# Patient Record
Sex: MALE | Race: WHITE | NOT HISPANIC OR LATINO | Employment: UNEMPLOYED | URBAN - NONMETROPOLITAN AREA
[De-identification: names, ages, dates, MRNs, and addresses within clinical notes are randomized per-mention and may not be internally consistent; named-entity substitution may affect disease eponyms.]

---

## 2016-02-15 LAB — HCV AB SER-ACNC: NEGATIVE

## 2016-12-15 LAB
EXTERNAL HIV CONFIRMATION: NORMAL
EXTERNAL HIV SCREEN: NORMAL

## 2024-03-17 ENCOUNTER — HOSPITAL ENCOUNTER (INPATIENT)
Facility: HOSPITAL | Age: 34
LOS: 1 days | DRG: 045 | End: 2024-03-18
Attending: EMERGENCY MEDICINE | Admitting: ANESTHESIOLOGY
Payer: COMMERCIAL

## 2024-03-17 ENCOUNTER — APPOINTMENT (EMERGENCY)
Dept: CT IMAGING | Facility: HOSPITAL | Age: 34
DRG: 045 | End: 2024-03-17
Payer: COMMERCIAL

## 2024-03-17 ENCOUNTER — APPOINTMENT (EMERGENCY)
Dept: RADIOLOGY | Facility: HOSPITAL | Age: 34
DRG: 045 | End: 2024-03-17
Payer: COMMERCIAL

## 2024-03-17 DIAGNOSIS — R53.1 ACUTE LEFT-SIDED WEAKNESS: Primary | ICD-10-CM

## 2024-03-17 DIAGNOSIS — I63.9 STROKE (HCC): ICD-10-CM

## 2024-03-17 DIAGNOSIS — I16.1 HYPERTENSIVE EMERGENCY: ICD-10-CM

## 2024-03-17 PROBLEM — I63.532 CEREBROVASCULAR ACCIDENT (CVA) DUE TO OCCLUSION OF LEFT POSTERIOR CEREBRAL ARTERY (HCC): Status: ACTIVE | Noted: 2024-03-17

## 2024-03-17 PROBLEM — F11.90 METHADONE USE: Status: ACTIVE | Noted: 2024-03-17

## 2024-03-17 PROBLEM — I67.1 ANEURYSM OF ANTERIOR COMMUNICATING ARTERY: Status: ACTIVE | Noted: 2024-03-17

## 2024-03-17 LAB
2HR DELTA HS TROPONIN: 16 NG/L
2HR DELTA HS TROPONIN: 227 NG/L
4HR DELTA HS TROPONIN: 275 NG/L
4HR DELTA HS TROPONIN: 81 NG/L
ALBUMIN SERPL BCP-MCNC: 3.7 G/DL (ref 3.5–5)
ALP SERPL-CCNC: 54 U/L (ref 34–104)
ALT SERPL W P-5'-P-CCNC: 15 U/L (ref 7–52)
AMPHETAMINES SERPL QL SCN: NEGATIVE
AMPHETAMINES SERPL QL SCN: NEGATIVE
ANION GAP SERPL CALCULATED.3IONS-SCNC: 5 MMOL/L (ref 4–13)
APTT PPP: 21 SECONDS (ref 23–37)
AST SERPL W P-5'-P-CCNC: 13 U/L (ref 13–39)
BARBITURATES UR QL: NEGATIVE
BARBITURATES UR QL: NEGATIVE
BENZODIAZ UR QL: NEGATIVE
BENZODIAZ UR QL: NEGATIVE
BILIRUB SERPL-MCNC: 0.55 MG/DL (ref 0.2–1)
BILIRUB UR QL STRIP: NEGATIVE
BNP SERPL-MCNC: 98 PG/ML (ref 0–100)
BUN SERPL-MCNC: 17 MG/DL (ref 5–25)
C3 SERPL-MCNC: 140 MG/DL (ref 87–200)
C4 SERPL-MCNC: 34 MG/DL (ref 19–52)
CALCIUM SERPL-MCNC: 9 MG/DL (ref 8.4–10.2)
CARDIAC TROPONIN I PNL SERPL HS: 13 NG/L
CARDIAC TROPONIN I PNL SERPL HS: 155 NG/L
CARDIAC TROPONIN I PNL SERPL HS: 29 NG/L
CARDIAC TROPONIN I PNL SERPL HS: 301 NG/L
CARDIAC TROPONIN I PNL SERPL HS: 349 NG/L
CARDIAC TROPONIN I PNL SERPL HS: 74 NG/L
CHLORIDE SERPL-SCNC: 101 MMOL/L (ref 96–108)
CK SERPL-CCNC: 69 U/L (ref 39–308)
CLARITY UR: CLEAR
CO2 SERPL-SCNC: 28 MMOL/L (ref 21–32)
COCAINE UR QL: NEGATIVE
COCAINE UR QL: NEGATIVE
COLOR UR: YELLOW
CREAT SERPL-MCNC: 1.08 MG/DL (ref 0.6–1.3)
CRP SERPL HS-MCNC: 5.13 MG/L
CRP SERPL QL: 4.7 MG/L
CRP SERPL QL: 5.9 MG/L
ERYTHROCYTE [DISTWIDTH] IN BLOOD BY AUTOMATED COUNT: 13.1 % (ref 11.6–15.1)
ERYTHROCYTE [SEDIMENTATION RATE] IN BLOOD: 7 MM/HOUR (ref 0–14)
ETHANOL SERPL-MCNC: <10 MG/DL
FLUAV RNA RESP QL NAA+PROBE: NEGATIVE
FLUBV RNA RESP QL NAA+PROBE: NEGATIVE
GFR SERPL CREATININE-BSD FRML MDRD: 88 ML/MIN/1.73SQ M
GLUCOSE SERPL-MCNC: 116 MG/DL (ref 65–140)
GLUCOSE SERPL-MCNC: 120 MG/DL (ref 65–140)
GLUCOSE UR STRIP-MCNC: NEGATIVE MG/DL
HCT VFR BLD AUTO: 38.7 % (ref 36.5–49.3)
HCYS SERPL-SCNC: 10.9 UMOL/L (ref 5–15)
HGB BLD-MCNC: 12.9 G/DL (ref 12–17)
HGB UR QL STRIP.AUTO: NEGATIVE
INR PPP: 1.1 (ref 0.84–1.19)
KETONES UR STRIP-MCNC: NEGATIVE MG/DL
LACTATE SERPL-SCNC: 1.3 MMOL/L (ref 0.5–2)
LEUKOCYTE ESTERASE UR QL STRIP: NEGATIVE
MCH RBC QN AUTO: 27.7 PG (ref 26.8–34.3)
MCHC RBC AUTO-ENTMCNC: 33.3 G/DL (ref 31.4–37.4)
MCV RBC AUTO: 83 FL (ref 82–98)
METHADONE UR QL: POSITIVE
METHADONE UR QL: POSITIVE
NITRITE UR QL STRIP: NEGATIVE
OPIATES UR QL SCN: NEGATIVE
OPIATES UR QL SCN: NEGATIVE
OXYCODONE+OXYMORPHONE UR QL SCN: NEGATIVE
OXYCODONE+OXYMORPHONE UR QL SCN: NEGATIVE
PCP UR QL: NEGATIVE
PCP UR QL: NEGATIVE
PH UR STRIP.AUTO: 8.5 [PH]
PLATELET # BLD AUTO: 150 THOUSANDS/UL (ref 149–390)
PMV BLD AUTO: 10.7 FL (ref 8.9–12.7)
POTASSIUM SERPL-SCNC: 3.7 MMOL/L (ref 3.5–5.3)
PROT SERPL-MCNC: 5.8 G/DL (ref 6.4–8.4)
PROT UR STRIP-MCNC: NEGATIVE MG/DL
PROTHROMBIN TIME: 14.6 SECONDS (ref 11.6–14.5)
RBC # BLD AUTO: 4.66 MILLION/UL (ref 3.88–5.62)
RSV RNA RESP QL NAA+PROBE: NEGATIVE
SARS-COV-2 RNA RESP QL NAA+PROBE: NEGATIVE
SODIUM SERPL-SCNC: 134 MMOL/L (ref 135–147)
SP GR UR STRIP.AUTO: 1.01 (ref 1–1.03)
THC UR QL: NEGATIVE
THC UR QL: NEGATIVE
TSH SERPL DL<=0.05 MIU/L-ACNC: 0.83 UIU/ML (ref 0.45–4.5)
UROBILINOGEN UR QL STRIP.AUTO: 0.2 E.U./DL
WBC # BLD AUTO: 7.01 THOUSAND/UL (ref 4.31–10.16)

## 2024-03-17 PROCEDURE — 85652 RBC SED RATE AUTOMATED: CPT | Performed by: PHYSICIAN ASSISTANT

## 2024-03-17 PROCEDURE — 82948 REAGENT STRIP/BLOOD GLUCOSE: CPT

## 2024-03-17 PROCEDURE — G0508 CRIT CARE TELEHEA CONSULT 60: HCPCS | Performed by: STUDENT IN AN ORGANIZED HEALTH CARE EDUCATION/TRAINING PROGRAM

## 2024-03-17 PROCEDURE — 85610 PROTHROMBIN TIME: CPT | Performed by: EMERGENCY MEDICINE

## 2024-03-17 PROCEDURE — 82077 ASSAY SPEC XCP UR&BREATH IA: CPT | Performed by: PHYSICIAN ASSISTANT

## 2024-03-17 PROCEDURE — 85300 ANTITHROMBIN III ACTIVITY: CPT | Performed by: PHYSICIAN ASSISTANT

## 2024-03-17 PROCEDURE — 85306 CLOT INHIBIT PROT S FREE: CPT | Performed by: PHYSICIAN ASSISTANT

## 2024-03-17 PROCEDURE — 82550 ASSAY OF CK (CPK): CPT | Performed by: PHYSICIAN ASSISTANT

## 2024-03-17 PROCEDURE — 85705 THROMBOPLASTIN INHIBITION: CPT | Performed by: PHYSICIAN ASSISTANT

## 2024-03-17 PROCEDURE — 86160 COMPLEMENT ANTIGEN: CPT | Performed by: PHYSICIAN ASSISTANT

## 2024-03-17 PROCEDURE — 86141 C-REACTIVE PROTEIN HS: CPT | Performed by: PHYSICIAN ASSISTANT

## 2024-03-17 PROCEDURE — 85027 COMPLETE CBC AUTOMATED: CPT | Performed by: EMERGENCY MEDICINE

## 2024-03-17 PROCEDURE — 86235 NUCLEAR ANTIGEN ANTIBODY: CPT | Performed by: PHYSICIAN ASSISTANT

## 2024-03-17 PROCEDURE — 84443 ASSAY THYROID STIM HORMONE: CPT | Performed by: PHYSICIAN ASSISTANT

## 2024-03-17 PROCEDURE — 83520 IMMUNOASSAY QUANT NOS NONAB: CPT | Performed by: PHYSICIAN ASSISTANT

## 2024-03-17 PROCEDURE — 96375 TX/PRO/DX INJ NEW DRUG ADDON: CPT

## 2024-03-17 PROCEDURE — 70496 CT ANGIOGRAPHY HEAD: CPT

## 2024-03-17 PROCEDURE — 36415 COLL VENOUS BLD VENIPUNCTURE: CPT | Performed by: EMERGENCY MEDICINE

## 2024-03-17 PROCEDURE — 71045 X-RAY EXAM CHEST 1 VIEW: CPT

## 2024-03-17 PROCEDURE — 83605 ASSAY OF LACTIC ACID: CPT | Performed by: PHYSICIAN ASSISTANT

## 2024-03-17 PROCEDURE — 83090 ASSAY OF HOMOCYSTEINE: CPT | Performed by: PHYSICIAN ASSISTANT

## 2024-03-17 PROCEDURE — 83880 ASSAY OF NATRIURETIC PEPTIDE: CPT | Performed by: PHYSICIAN ASSISTANT

## 2024-03-17 PROCEDURE — 86147 CARDIOLIPIN ANTIBODY EA IG: CPT | Performed by: PHYSICIAN ASSISTANT

## 2024-03-17 PROCEDURE — 93005 ELECTROCARDIOGRAM TRACING: CPT

## 2024-03-17 PROCEDURE — 84484 ASSAY OF TROPONIN QUANT: CPT | Performed by: PHYSICIAN ASSISTANT

## 2024-03-17 PROCEDURE — 85730 THROMBOPLASTIN TIME PARTIAL: CPT | Performed by: EMERGENCY MEDICINE

## 2024-03-17 PROCEDURE — 96374 THER/PROPH/DIAG INJ IV PUSH: CPT

## 2024-03-17 PROCEDURE — 70498 CT ANGIOGRAPHY NECK: CPT

## 2024-03-17 PROCEDURE — 84484 ASSAY OF TROPONIN QUANT: CPT | Performed by: EMERGENCY MEDICINE

## 2024-03-17 PROCEDURE — 99285 EMERGENCY DEPT VISIT HI MDM: CPT

## 2024-03-17 PROCEDURE — 86140 C-REACTIVE PROTEIN: CPT | Performed by: PHYSICIAN ASSISTANT

## 2024-03-17 PROCEDURE — 85732 THROMBOPLASTIN TIME PARTIAL: CPT | Performed by: PHYSICIAN ASSISTANT

## 2024-03-17 PROCEDURE — 86038 ANTINUCLEAR ANTIBODIES: CPT | Performed by: PHYSICIAN ASSISTANT

## 2024-03-17 PROCEDURE — 85305 CLOT INHIBIT PROT S TOTAL: CPT | Performed by: PHYSICIAN ASSISTANT

## 2024-03-17 PROCEDURE — 86146 BETA-2 GLYCOPROTEIN ANTIBODY: CPT | Performed by: PHYSICIAN ASSISTANT

## 2024-03-17 PROCEDURE — 99291 CRITICAL CARE FIRST HOUR: CPT | Performed by: EMERGENCY MEDICINE

## 2024-03-17 PROCEDURE — 80307 DRUG TEST PRSMV CHEM ANLYZR: CPT | Performed by: EMERGENCY MEDICINE

## 2024-03-17 PROCEDURE — 86037 ANCA TITER EACH ANTIBODY: CPT | Performed by: PHYSICIAN ASSISTANT

## 2024-03-17 PROCEDURE — 82164 ANGIOTENSIN I ENZYME TEST: CPT | Performed by: PHYSICIAN ASSISTANT

## 2024-03-17 PROCEDURE — 86430 RHEUMATOID FACTOR TEST QUAL: CPT | Performed by: PHYSICIAN ASSISTANT

## 2024-03-17 PROCEDURE — 86162 COMPLEMENT TOTAL (CH50): CPT | Performed by: PHYSICIAN ASSISTANT

## 2024-03-17 PROCEDURE — 85303 CLOT INHIBIT PROT C ACTIVITY: CPT | Performed by: PHYSICIAN ASSISTANT

## 2024-03-17 PROCEDURE — 85670 THROMBIN TIME PLASMA: CPT | Performed by: PHYSICIAN ASSISTANT

## 2024-03-17 PROCEDURE — 81003 URINALYSIS AUTO W/O SCOPE: CPT | Performed by: PHYSICIAN ASSISTANT

## 2024-03-17 PROCEDURE — 80053 COMPREHEN METABOLIC PANEL: CPT | Performed by: EMERGENCY MEDICINE

## 2024-03-17 PROCEDURE — 85613 RUSSELL VIPER VENOM DILUTED: CPT | Performed by: PHYSICIAN ASSISTANT

## 2024-03-17 PROCEDURE — 0241U HB NFCT DS VIR RESP RNA 4 TRGT: CPT | Performed by: EMERGENCY MEDICINE

## 2024-03-17 PROCEDURE — NC001 PR NO CHARGE: Performed by: STUDENT IN AN ORGANIZED HEALTH CARE EDUCATION/TRAINING PROGRAM

## 2024-03-17 RX ORDER — ONDANSETRON 2 MG/ML
4 INJECTION INTRAMUSCULAR; INTRAVENOUS ONCE
Status: COMPLETED | OUTPATIENT
Start: 2024-03-17 | End: 2024-03-17

## 2024-03-17 RX ORDER — ASPIRIN 81 MG/1
81 TABLET, CHEWABLE ORAL DAILY
Status: DISCONTINUED | OUTPATIENT
Start: 2024-03-18 | End: 2024-03-18 | Stop reason: HOSPADM

## 2024-03-17 RX ORDER — METOCLOPRAMIDE HYDROCHLORIDE 5 MG/ML
10 INJECTION INTRAMUSCULAR; INTRAVENOUS ONCE
Status: COMPLETED | OUTPATIENT
Start: 2024-03-17 | End: 2024-03-17

## 2024-03-17 RX ORDER — HYDRALAZINE HYDROCHLORIDE 20 MG/ML
10 INJECTION INTRAMUSCULAR; INTRAVENOUS ONCE
Status: COMPLETED | OUTPATIENT
Start: 2024-03-17 | End: 2024-03-17

## 2024-03-17 RX ORDER — METHADONE HYDROCHLORIDE 10 MG/1
60 TABLET ORAL ONCE
Status: COMPLETED | OUTPATIENT
Start: 2024-03-17 | End: 2024-03-17

## 2024-03-17 RX ORDER — METHADONE HYDROCHLORIDE 10 MG/1
50 TABLET ORAL ONCE
Qty: 5 TABLET | Refills: 0 | Status: COMPLETED | OUTPATIENT
Start: 2024-03-17 | End: 2024-03-17

## 2024-03-17 RX ORDER — ESMOLOL HYDROCHLORIDE 10 MG/ML
25-200 INJECTION, SOLUTION INTRAVENOUS
Status: DISCONTINUED | OUTPATIENT
Start: 2024-03-17 | End: 2024-03-17

## 2024-03-17 RX ORDER — ENOXAPARIN SODIUM 100 MG/ML
40 INJECTION SUBCUTANEOUS DAILY
Status: DISCONTINUED | OUTPATIENT
Start: 2024-03-18 | End: 2024-03-18 | Stop reason: HOSPADM

## 2024-03-17 RX ORDER — METHADONE HYDROCHLORIDE 10 MG/1
TABLET ORAL
Status: DISCONTINUED
Start: 2024-03-17 | End: 2024-03-18 | Stop reason: HOSPADM

## 2024-03-17 RX ORDER — LABETALOL HYDROCHLORIDE 5 MG/ML
10 INJECTION, SOLUTION INTRAVENOUS
Status: COMPLETED | OUTPATIENT
Start: 2024-03-17 | End: 2024-03-17

## 2024-03-17 RX ORDER — LABETALOL HYDROCHLORIDE 5 MG/ML
10 INJECTION, SOLUTION INTRAVENOUS ONCE
Status: COMPLETED | OUTPATIENT
Start: 2024-03-17 | End: 2024-03-17

## 2024-03-17 RX ORDER — ESMOLOL HYDROCHLORIDE 10 MG/ML
40 INJECTION INTRAVENOUS ONCE
Status: DISCONTINUED | OUTPATIENT
Start: 2024-03-17 | End: 2024-03-17

## 2024-03-17 RX ORDER — METHADONE HYDROCHLORIDE 10 MG/5ML
110 SOLUTION ORAL EVERY 6 HOURS PRN
Status: ON HOLD | COMMUNITY
End: 2024-03-23

## 2024-03-17 RX ORDER — METHADONE HYDROCHLORIDE 5 MG/1
TABLET ORAL
Status: DISCONTINUED
Start: 2024-03-17 | End: 2024-03-18 | Stop reason: HOSPADM

## 2024-03-17 RX ORDER — LABETALOL HYDROCHLORIDE 5 MG/ML
10 INJECTION, SOLUTION INTRAVENOUS EVERY 4 HOURS PRN
Status: DISCONTINUED | OUTPATIENT
Start: 2024-03-17 | End: 2024-03-18 | Stop reason: HOSPADM

## 2024-03-17 RX ORDER — CHLORHEXIDINE GLUCONATE ORAL RINSE 1.2 MG/ML
15 SOLUTION DENTAL EVERY 12 HOURS SCHEDULED
Status: DISCONTINUED | OUTPATIENT
Start: 2024-03-17 | End: 2024-03-18 | Stop reason: HOSPADM

## 2024-03-17 RX ORDER — ATORVASTATIN CALCIUM 40 MG/1
40 TABLET, FILM COATED ORAL EVERY EVENING
Status: DISCONTINUED | OUTPATIENT
Start: 2024-03-17 | End: 2024-03-18 | Stop reason: HOSPADM

## 2024-03-17 RX ORDER — LABETALOL HYDROCHLORIDE 5 MG/ML
20 INJECTION, SOLUTION INTRAVENOUS ONCE
Status: COMPLETED | OUTPATIENT
Start: 2024-03-17 | End: 2024-03-17

## 2024-03-17 RX ORDER — ASPIRIN 81 MG/1
81 TABLET, CHEWABLE ORAL DAILY
Status: DISCONTINUED | OUTPATIENT
Start: 2024-03-18 | End: 2024-03-17

## 2024-03-17 RX ORDER — ATORVASTATIN CALCIUM 40 MG/1
40 TABLET, FILM COATED ORAL EVERY EVENING
Status: DISCONTINUED | OUTPATIENT
Start: 2024-03-17 | End: 2024-03-17

## 2024-03-17 RX ADMIN — METHADONE HYDROCHLORIDE 60 MG: 10 TABLET ORAL at 20:25

## 2024-03-17 RX ADMIN — SODIUM CHLORIDE 5 MG/HR: 0.9 INJECTION, SOLUTION INTRAVENOUS at 20:24

## 2024-03-17 RX ADMIN — LABETALOL HYDROCHLORIDE 20 MG: 5 INJECTION, SOLUTION INTRAVENOUS at 17:05

## 2024-03-17 RX ADMIN — LABETALOL HYDROCHLORIDE 10 MG: 5 INJECTION, SOLUTION INTRAVENOUS at 16:03

## 2024-03-17 RX ADMIN — SODIUM CHLORIDE 5 MG/HR: 0.9 INJECTION, SOLUTION INTRAVENOUS at 14:12

## 2024-03-17 RX ADMIN — ONDANSETRON 4 MG: 2 INJECTION INTRAMUSCULAR; INTRAVENOUS at 12:56

## 2024-03-17 RX ADMIN — ASPIRIN 324 MG: 81 TABLET, COATED ORAL at 17:05

## 2024-03-17 RX ADMIN — METOCLOPRAMIDE HYDROCHLORIDE 10 MG: 5 INJECTION INTRAMUSCULAR; INTRAVENOUS at 13:32

## 2024-03-17 RX ADMIN — LABETALOL HYDROCHLORIDE 10 MG: 5 INJECTION, SOLUTION INTRAVENOUS at 15:31

## 2024-03-17 RX ADMIN — METHADONE HYDROCHLORIDE 50 MG: 10 TABLET ORAL at 19:27

## 2024-03-17 RX ADMIN — ATORVASTATIN CALCIUM 40 MG: 40 TABLET, FILM COATED ORAL at 17:05

## 2024-03-17 RX ADMIN — CHLORHEXIDINE GLUCONATE 15 ML: 1.2 RINSE ORAL at 20:27

## 2024-03-17 RX ADMIN — HYDRALAZINE HYDROCHLORIDE 10 MG: 20 INJECTION INTRAMUSCULAR; INTRAVENOUS at 12:32

## 2024-03-17 RX ADMIN — IOHEXOL 100 ML: 350 INJECTION, SOLUTION INTRAVENOUS at 12:19

## 2024-03-17 RX ADMIN — LABETALOL HYDROCHLORIDE 10 MG: 5 INJECTION, SOLUTION INTRAVENOUS at 16:49

## 2024-03-17 RX ADMIN — HYDRALAZINE HYDROCHLORIDE 10 MG: 20 INJECTION INTRAMUSCULAR; INTRAVENOUS at 12:53
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
## 2024-03-17 NOTE — ED NOTES
One 10 mg and one 5 mg methadone tablet override from [email or web address] by [facility] Supervisor Joyce Smart per pharmacy due to incorrect amount of medication initially pulled from [email or web address]. [facility] did not allow nursing staff to re-open the cabinet due to not having enough stocked. Total of 105 mg to be given as ordered by Dr. Coon.      Yudelka Gutierrez, RN  03/17/24 6459

## 2024-03-17 NOTE — ASSESSMENT & PLAN NOTE
Will need to confirm patient's outpatient clinic and his dosing  Ordered 105 mg by ED staff as patient states that is his dose

## 2024-03-17 NOTE — ASSESSMENT & PLAN NOTE
"Presented with left sided numbness and weakness since about 3 am today; Of note yesterday had visual disturbance of the left eye yesterday afternoon that aborted spontaneously but had no issues until this AM  Initial CTH   \"Old lacunar infarction within the right basal ganglia. Mild surrounding low density which may represent chronic gliosis. However, the possibility of subacute ivelisse-infarct ischemia is not excluded\"  Initial CTA:  \"occlusion of the left posterior cerebral artery proximally with some distal reconstitution; anterior M2 branches on the left demonstrates moderate atherosclerotic narrowing; 2 mm aneurysm of the anterior communicating artery at the junction of the left A1 and A2 segments.\"  TNK: Outside of window  Neurology following  Spoke with neurology who initially spoke with neuro IR and deemed not a candidate  Due to patient's significant history I spoke with NCC and think he would be better served at tertiary center therefore will transfer to [facility]  Given significant history, differential includes vasculitis (see plan below)  Current Neuro Exam:  Left sided numbess, left facial droop, slowed speech, LUE 4-/5 LLE 4/5, Appears to beLeft sided ataxia but difficult to assess given weakness  Plan:  Continue stroke pathway  MRI ordered, also would benefit from MRA  Antiplatelet Plan: given aspirin 325 mg now then daily  TTE pending  High Intensity Statin  Lipid Panel pending  Blood pressure goal:  < 200  Continue telemetry  PT/OT/Speech following  PMR consult  Hemoglobin A1C: Pending  Send hypercoagulable and vasculitis workup   "

## 2024-03-17 NOTE — ED PROVIDER NOTES
History  Chief Complaint   Patient presents with    STROKE Alert     From home with left sided weakness, unequal /strength, N/V with visual disturbance, HA, that began yesterday. Hx of hypertension, uncontrolled.      33-year-old male presenting to the emergency room with left-sided weakness.  There were several conflicting reports about when the symptomatology may have started.  Initially EMS was reporting that the patient had some symptomatology yesterday.  It was later reported the patient's symptomatology started around 9 AM.  I was able to discern with the patient that around 3 AM he awoke to help his wife get ready for work.  He noticed that he was stumbling.  He reports that he went to bed feeling well.  He reports that the only symptom he had yesterday was that he had loss of vision in his right eye which resolved after about 1 minute.  Other than that, he had no symptomatology yesterday.    Patient is persisting with left-sided weakness upon presentation.  Patient was also noted to be profoundly hypertensive.  He reported that he had previous history of hypertension but he was no longer on medication for same.      History provided by:  Patient  STROKE Alert  Location:  Left-sided weakness  Severity:  Severe  Associated symptoms: headaches and nausea    Associated symptoms: no abdominal pain, no chest pain, no congestion, no loss of consciousness, no shortness of breath and no vomiting        None       Past Medical History:   Diagnosis Date    Hypertension        History reviewed. No pertinent surgical history.    History reviewed. No pertinent family history.  I have reviewed and agree with the history as documented.    E-Cigarette/Vaping     E-Cigarette/Vaping Substances          Review of Systems   Constitutional: Negative.    HENT:  Negative for congestion.    Respiratory: Negative.  Negative for shortness of breath.    Cardiovascular:  Negative for chest pain.   Gastrointestinal:  Positive for  nausea. Negative for abdominal pain and vomiting.   Neurological:  Positive for facial asymmetry, weakness and headaches. Negative for dizziness, loss of consciousness, speech difficulty and light-headedness.   All other systems reviewed and are negative.      Physical Exam  Physical Exam  Vitals and nursing note reviewed.   Constitutional:       General: He is in acute distress.      Appearance: Normal appearance. He is well-developed. He is not ill-appearing or toxic-appearing.   HENT:      Head: Normocephalic and atraumatic. Hair is normal.      Jaw: No pain on movement.   Eyes:      General: Lids are normal.      Extraocular Movements: Extraocular movements intact.      Pupils: Pupils are equal, round, and reactive to light.   Cardiovascular:      Rate and Rhythm: Normal rate and regular rhythm.      Heart sounds: Normal heart sounds. No murmur heard.  Pulmonary:      Effort: Pulmonary effort is normal. No respiratory distress.      Breath sounds: Normal breath sounds. No decreased breath sounds, wheezing, rhonchi or rales.   Abdominal:      General: Abdomen is flat.      Palpations: Abdomen is soft. Abdomen is not rigid.   Musculoskeletal:         General: No swelling, tenderness, deformity or signs of injury. Normal range of motion.      Cervical back: Normal range of motion and neck supple.   Skin:     General: Skin is warm and dry.      Coloration: Skin is not pale.      Findings: No rash.   Neurological:      Mental Status: He is alert.      Cranial Nerves: Cranial nerve deficit present.      Sensory: Sensory deficit present.      Motor: Weakness present.      Coordination: Coordination abnormal.   Psychiatric:         Attention and Perception: Attention normal.         Mood and Affect: Mood is anxious. Affect is tearful.         Speech: Speech normal.         Vital Signs  ED Triage Vitals   Temperature Pulse Respirations Blood Pressure SpO2   03/17/24 1210 03/17/24 1210 03/17/24 1210 03/17/24 1225 03/17/24  1210   98.2 °F (36.8 °C) 75 (!) 26 (!) 254/119 99 %      Temp Source Heart Rate Source Patient Position - Orthostatic VS BP Location FiO2 (%)   03/17/24 1210 03/17/24 1210 03/17/24 1300 -- --   Temporal Monitor Lying        Pain Score       --                  Vitals:    03/17/24 1240 03/17/24 1253 03/17/24 1255 03/17/24 1300   BP: (!) 235/110 (!) 229/106 (!) 235/107 (S) (!) 244/114   Pulse: 77  80 94   Patient Position - Orthostatic VS:    Lying         Visual Acuity  Visual Acuity      Flowsheet Row Most Recent Value   L Pupil Size (mm) 3   R Pupil Size (mm) 3            ED Medications  Medications   metoclopramide (REGLAN) injection 10 mg (has no administration in time range)   niCARdipine (CARDENE) 25 mg (STANDARD CONCENTRATION) in sodium chloride 0.9% 250 mL (has no administration in time range)   iohexol (OMNIPAQUE) 350 MG/ML injection (MULTI-DOSE) 100 mL (100 mL Intravenous Given 3/17/24 1219)   hydrALAZINE (APRESOLINE) injection 10 mg (10 mg Intravenous Given 3/17/24 1232)   hydrALAZINE (APRESOLINE) injection 10 mg (10 mg Intravenous Given 3/17/24 1253)   ondansetron (ZOFRAN) injection 4 mg (4 mg Intravenous Given 3/17/24 1256)       Diagnostic Studies  Results Reviewed       Procedure Component Value Units Date/Time    Rapid drug screen, urine [[id number]]     Lab Status: No result Specimen: Urine     FLU/RSV/COVID - if FLU/RSV clinically relevant [[id number]]  (Normal) Collected: 03/17/24 1226    Lab Status: Final result Specimen: Nares from Nasopharyngeal Swab Updated: 03/17/24 1309     SARS-CoV-2 Negative     INFLUENZA A PCR Negative     INFLUENZA B PCR Negative     RSV PCR Negative    Narrative:      FOR PEDIATRIC PATIENTS - copy/paste COVID Guidelines URL to browser: [email or web address]    SARS-CoV-2 assay is a Nucleic Acid Amplification assay intended for the  qualitative detection of nucleic acid from SARS-CoV-2 in nasopharyngeal  swabs. Results  are for the presumptive identification of SARS-CoV-2 RNA.    Positive results are indicative of infection with SARS-CoV-2, the virus  causing COVID-19, but do not rule out bacterial infection or co-infection  with other viruses. Laboratories within the [place] and its  territories are required to report all positive results to the appropriate  public health authorities. Negative results do not preclude SARS-CoV-2  infection and should not be used as the sole basis for treatment or other  patient management decisions. Negative results must be combined with  clinical observations, patient history, and epidemiological information.  This test has not been FDA cleared or approved.    This test has been authorized by FDA under an Emergency Use Authorization  (EUA). This test is only authorized for the duration of time the  declaration that circumstances exist justifying the authorization of the  emergency use of an in vitro diagnostic tests for detection of SARS-CoV-2  virus and/or diagnosis of COVID-19 infection under section 564(b)(1) of  the Act, 21 U.S.C. 360bbb-3(b)(1), unless the authorization is terminated  or revoked sooner. The test has been validated but independent review by FDA  and CLIA is pending.    Test performed using [employer] GeneXpert: This RT-PCR assay targets N2,  a region unique to SARS-CoV-2. A conserved region in the E-gene was chosen  for pan-Sarbecovirus detection which includes SARS-CoV-2.    According to CMS-2020-01-R, this platform meets the definition of high-throughput technology.    HS Troponin 0hr (reflex protocol) [[id number]]  (Normal) Collected: 03/17/24 1226    Lab Status: Final result Specimen: Blood from Arm, Left Updated: 03/17/24 1255     hs TnI 0hr 13 ng/L     HS Troponin I 2hr [[id number]]     Lab Status: No result Specimen: Blood     Comprehensive metabolic panel [[id number]]  (Abnormal) Collected: 03/17/24 1226    Lab Status: Final result Specimen: Blood from Arm, Left  Updated: 03/17/24 1251     Sodium 134 mmol/L      Potassium 3.7 mmol/L      Chloride 101 mmol/L      CO2 28 mmol/L      ANION GAP 5 mmol/L      BUN 17 mg/dL      Creatinine 1.08 mg/dL      Glucose 120 mg/dL      Calcium 9.0 mg/dL      AST 13 U/L      ALT 15 U/L      Alkaline Phosphatase 54 U/L      Total Protein 5.8 g/dL      Albumin 3.7 g/dL      Total Bilirubin 0.55 mg/dL      eGFR 88 ml/min/1.73sq m     Narrative:      National Kidney Disease Foundation guidelines for Chronic Kidney Disease (CKD):     Stage 1 with normal or high GFR (GFR > 90 mL/min/1.73 square meters)    Stage 2 Mild CKD (GFR = 60-89 mL/min/1.73 square meters)    Stage 3A Moderate CKD (GFR = 45-59 mL/min/1.73 square meters)    Stage 3B Moderate CKD (GFR = 30-44 mL/min/1.73 square meters)    Stage 4 Severe CKD (GFR = 15-29 mL/min/1.73 square meters)    Stage 5 End Stage CKD (GFR <15 mL/min/1.73 square meters)  Note: GFR calculation is accurate only with a steady state creatinine    Protime-INR [[id number]]  (Abnormal) Collected: 03/17/24 1226    Lab Status: Final result Specimen: Blood from Arm, Left Updated: 03/17/24 1245     Protime 14.6 seconds      INR 1.10    APTT [[id number]]  (Abnormal) Collected: 03/17/24 1226    Lab Status: Final result Specimen: Blood from Arm, Left Updated: 03/17/24 1245     PTT 21 seconds     CBC and Platelet [[id number]]  (Normal) Collected: 03/17/24 1226    Lab Status: Final result Specimen: Blood from Arm, Left Updated: 03/17/24 1231     WBC 7.01 Thousand/uL      RBC 4.66 Million/uL      Hemoglobin 12.9 g/dL      Hematocrit 38.7 %      MCV 83 fL      MCH 27.7 pg      MCHC 33.3 g/dL      RDW 13.1 %      Platelets 150 Thousands/uL      MPV 10.7 fL     Fingerstick Glucose (POCT) [[id number]]  (Normal) Collected: 03/17/24 1211    Lab Status: Final result Updated: 03/17/24 1214     POC Glucose 116 mg/dl                    X-ray chest 1 view portable   ED Interpretation by Velasquez Coon DO (03/17 3662)   No active  "disease.      Final Result by Randolph Chirinos MD (03/17 1313)      No acute cardiopulmonary disease.            Workstation performed: [id number]         CTA stroke alert (head/neck)   Final Result by Brenton Stone DO (03/17 1239)      Mild smooth stenosis of the right cavernous internal carotid artery.      There appears to be occlusion of the left posterior cerebral artery proximally with some distal reconstitution.      At least one of the anterior M2 branches on the left demonstrates moderate atherosclerotic narrowing.      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 Recommend consultation with [facility] of [facility] at [phone].            Findings were directly discussed with Emily Sanchez at 12:30 p.m.      This examination was marked \"immediate notification\" in Epic in order to begin the standard process by which the radiology reading room liaison alerts the referring practitioner.                           Workstation performed: [id number]         CT stroke alert brain   Final Result by Brenton Stone DO (03/17 1232)      Old lacunar infarction within the right basal ganglia. Mild surrounding low density which may represent chronic gliosis. However, the possibility of subacute ivelisse-infarct ischemia is not excluded. Recommend dedicated MRI of the brain with    diffusion-weighted imaging.      Findings were directly discussed with Emily Sanchez at approximately 12:30 p.m.      Workstation performed: [id number]                    Procedures  ECG 12 Lead Documentation Only    Date/Time: 3/17/2024 12:53 PM    Performed by: Velasquez Coon DO  Authorized by: Velasquez Coon DO    ECG reviewed by me, the ED Provider: yes    Patient location:  ED  Previous ECG:     Previous ECG:  Unavailable  Interpretation:     Interpretation: normal    Rate:     ECG rate assessment: " "normal    Rhythm:     Rhythm: sinus rhythm    Ectopy:     Ectopy: none    QRS:     QRS axis:  Normal  Conduction:     Conduction: abnormal      Abnormal conduction: incomplete RBBB    ST segments:     ST segments:  Non-specific  T waves:     T waves: non-specific             ED Course  ED Course as of 03/17/24 1324   Sun Mar 17, 2024   1233 Multiple conversations with stroke neurologist and patient.   1233 Patient has had a right-sided stroke.  NIH at this time is 5.    Further conversation with the patient reveals that he awoke around 3 AM to help his wife get ready for work and noted that he was \"stumbling.\"  He reports that he went to bed feeling well and normal.   1239 Old lacunar infarct in the right basal ganglia.   1243 CTA shows mild smooth stenosis of the right cavernous internal carotid artery. There appears to be occlusion of the left posterior cerebral artery proximally with some distal reconstitution     1250 Neurology Dr. Sanchez agrees with no TNK at this time.  She also discussed the case with endovascular.  No intervention at this time.  Patient will be admitted for stroke pathway.   1251 Wife reports that patient has not taken antihypertensive medications in over 2 years.   1321 Blood pressure poorly controlled with doses of hydralazine.  Will start antihypertensive drip.  Case was discussed with critical care medicine.  Patient will be admitted to that service.   1323 This was discussed with critical care medicine.  Patient mid to that service.                  Stroke Assessment       Row Name 03/17/24 1226             NIH Stroke Scale    Interval Baseline      Level of Consciousness (1a.) 0      LOC Questions (1b.) 0      LOC Commands (1c.) 0      Best Gaze (2.) 0      Visual (3.) 0      Facial Palsy (4.) 1      Motor Arm, Left (5a.) 1      Motor Arm, Right (5b.) 0      Motor Leg, Left (6a.) 1      Motor Leg, Right (6b.) 0      Limb Ataxia (7.) 1      Sensory (8.) 1      Best Language (9.) 0      " Dysarthria (10.) 0      Extinction and Inattention (11.) (Formerly Neglect) 0      Total 5                    Flowsheet Row Most Recent Value   Thrombolytic Decision Options    Thrombolytic Decision Patient not a candidate.   Patient is not a candidate options Unclear time of onset outside appropriate time window.  [Patient outside time window.]                      SBIRT 22yo+      Flowsheet Row Most Recent Value   Initial Alcohol Screen: US AUDIT-C     1. How often do you have a drink containing alcohol? 0 Filed at: 03/17/2024 1214   2. How many drinks containing alcohol do you have on a typical day you are drinking?  0 Filed at: 03/17/2024 1214   3a. Male UNDER 65: How often do you have five or more drinks on one occasion? 0 Filed at: 03/17/2024 1214   Audit-C Score 0 Filed at: 03/17/2024 1214   LUIS MANUEL: How many times in the past year have you...    Used an illegal drug or used a prescription medication for non-medical reasons? Never Filed at: 03/17/2024 1214                      Medical Decision Making  Patient presented to the emergency department and a MSE was performed. The patient was evaluated for complaint related to acute strokelike symptoms.  Patient is potentially at risk for, but not limited to, thrombotic stroke, embolic stroke, hemorrhagic stroke, hypertensive emergency, hypoglycemic episode, or migraine variant.  Several of these diagnoses have been evaluated and ruled out by history and physical.  As needed, patient will be further evaluated with laboratory and imaging studies.  Higher level diagnostics, such as CT imaging or ultrasound, may also be required.  Please see work-up portion of the note for further evaluation of patient's risk.  Socioeconomic factors were also considered as part of the decision-making process.  Unless otherwise stated in the chart or patient is admitted as elsewhere documented, any previously prescribed medications will be maintained.        Problems Addressed:  Acute  left-sided weakness: complicated acute illness or injury with systemic symptoms that poses a threat to life or bodily functions  Hypertensive emergency: complicated acute illness or injury with systemic symptoms that poses a threat to life or bodily functions  Stroke (HCC): complicated acute illness or injury with systemic symptoms that poses a threat to life or bodily functions    Amount and/or Complexity of Data Reviewed  Independent Historian: lynn  Labs: ordered.  Radiology: ordered and independent interpretation performed.  ECG/medicine tests: ordered and independent interpretation performed. Decision-making details documented in ED Course.  Discussion of management or test interpretation with external provider(s): Care coordinated with neurology and critical care medicine.    Risk  Prescription drug management.  Decision regarding hospitalization.  Risk Details: Patient presented to the ED and was found to be critically ill as demonstrated by the clinical history and primary physical evaluation. Pt had demonstrative findings and / or derangements of vital signs indicative for severe illness or injury. I personally performed bedside history and evaluation. Interventions to address these clinical needs were ordered/performed. These included, but not necessarily limited to, the ordering and subsequent review of lab studies, imaging and EKG.  Please see chart with regards to specific resuscitative interventions and diagnostics.     Due to a probability of clinically significant, life or limb threatening condition, the patient required my highest level of care, intervention and attention. I personally spent the documented time directly managing the patient. The critical care time included obtaining a history, examining the patient, ordering and review of studies, arranging urgent treatment with development of a management plan, evaluation of patient's response to treatment, reassessment, and, if warranted,  discussions with other providers or consultants. Documentation to the medical record for continuity of care was also required.     Patients records pertinent to the emergent presenting condition were reviewed as available.  Family was updated as available and appropriate.  This critical care time was performed to assess and manage the high probability of imminent, life-threatening deterioration that could result in multi-organ failure if not addressed. It was exclusive of separately billable procedures and treating other patients and teaching time. Please see MDM section and the rest of the note for further information on patient assessment, reassessment, interventions and treatment.    Total time was 40 mins exclusive of separate billable procedures.    Patient presented to the emergency department and a MSE was performed. The patient was evaluated and diagnosed with acute right-sided stroke with left-sided deficits and hypertensive emergency. This is a new issue that will require additional planned work-up and treatment in a hospitalized setting. As may have been required as part of this evaluation, clinical laboratory test, radiology imaging and medical testing (I.e. EKG) were ordered as necessitated by the patient's presentation. I independently reviewed these studies, imaging and testing. This patient's case is considered to be a considerable risk secondary to the above listed disease process and poses a threat to the patient's well-being and baseline function. Further in-patient diagnostic testing and management, which may include the administration of parenteral medications, is required.                     Disposition  Final diagnoses:   Acute left-sided weakness   Stroke (HCC)   Hypertensive emergency     Time reflects when diagnosis was documented in both MDM as applicable and the Disposition within this note       Time User Action Codes Description Comment    3/17/2024 12:13 PM Velasquez Coon Add [R53.1]  Acute left-sided weakness     3/17/2024 12:52 PM Velasquez Coon [I63.9] Stroke (HCC)     3/17/2024 12:52 PM Velasquez Coon [I10] Hypertension     3/17/2024  1:21 PM Velasquez Coon [I10] Hypertension     3/17/2024  1:21 PM Velasquez Coon [I16.1] Hypertensive emergency           ED Disposition       ED Disposition   Admit    Condition   Stable    Date/Time   Sun Mar 17, 2024 1252    Comment                  Follow-up Information    None         Patient's Medications    No medications on file       No discharge procedures on file.    PDMP Review       None            ED Provider  Electronically Signed by             Velasquez Coon DO  03/17/24 1732

## 2024-03-17 NOTE — ASSESSMENT & PLAN NOTE
Presents with stroke like symptoms and SBP of 254  In the past was on lisinopril 40 mg and amlodipine 10 mg PO but has not been on for several years  Likely other acute factors contributing:  CVA with left posterior cerebral artery occlusion  Not obtaining his normal methadone dose this AM  He states he takes it sometimes and SBP around 200 regularly    Plan:  Given the fact that he may have uncontrolled HTN as an outpatient will target -200 for now  Currently on cardene but will try IV and PO route  Obtain dosing regimen from methadone clinic

## 2024-03-17 NOTE — ASSESSMENT & PLAN NOTE
DEANNA Ashraf is a 33 y.o. right handed male with possible hypertension but not on any meds, not following with doctors who presents with left sided weakness and unsteady gait. LKN 3/16 pm when going to bed and symptoms started since 3 am. OOW for TNK. NIHSS 5. CTH with chronic right BG infarct. CTA with LPCA occlusion, imaging viewed on PACS and discussed with radiologist. Neuro endovasular team notified and discussed patient case with Dr. Aly. LPCA is occluded but it might be atretic in nature and no vision loss at this time so no acute intervention. Patient to be admitted for stroke work up.  SBP 250s, his symptoms might be blood pressure related and recrudescence of prior stroke. His right eye vision loss could also be amaurosis fugax or PRES. Daily headaches can be signs of severe hypertension vs vasculitis vs migraines/CADASIL.  Ddx: Acute ischemic stroke, hypertensive emergency, recrudescense,   Stroke etiology pending work up; small vessel, cardioembolic (in the setting of chronic uncontrolled HTN), vascular risk factors, vasculitis, CADASIL      - Admit to stroke pathway, MRI brain, Echo, A1c, lipid panel, telemetry. Check UDS  - Permissive HTN for now, BP no greater than 220/120 mmHg, gradual reduction   - Give  in ED and continue  ASA 81 mg daily  - Start statin   - Check autoimmune/inflammatory causes:  NISHA, C3/C4/C50, ANCA, SSA, SSB, RF, ACE, ESR, CRP  - Sent hypercoagulable/thrombosis panel before starting any AC  - Migraine cocktail as needed (avoid NSAIDS, Triptans, steroids and DHE)   - If above work up non revealing, consider LP with CSF analysis (glu, pro, WBC, RBC, C/S, flow, cytometry, ME panel, VDRL, EBV/CMV/HSV/VZV PCR, WNV, Lyme, ACE, JCV)   - Vascular risk factor management outpatient with PCP, A1c goal <6.5, LDL goal <70  - Consider ziopatch outpatient with cardiology   - Consider checking DSA or MRA vessel wall imaging and NOTCH 3 gene outpatient   - PT/OT/ST  -  on smoking  cessation and healthy lifestyle.

## 2024-03-17 NOTE — CONSULTS
Consultation - Stroke   DEANNA Ashraf 33 y.o. male MRN: [id number]  Unit/Bed#: ED 06 Encounter: [id number]    REQUIRED DOCUMENTATION:     1. This service was provided via Telemedicine.  2. Provider located at [place].  3. TeleMed provider: Emily Sanchez MD.  4. Identify all parties in room with patient during tele consult: Patient, wife  5.Patient was then informed that this was a Telemedicine visit and that the exam was being conducted confidentially over secure lines. My office door was closed. No one else was in the room.  Patient acknowledged consent and understanding of privacy and security of the Telemedicine visit, and gave us permission to have the assistant stay in the room in order to assist with the history and to conduct the exam.  I informed the patient that I have reviewed their record in Epic and presented the opportunity for them to ask any questions regarding the visit today.  The patient agreed to participate.         Assessment/Plan     Stroke (HCC)  Assessment & Plan  DEANNA Ashraf is a 33 y.o. right handed male with possible hypertension but not on any meds, not following with doctors who presents with left sided weakness and unsteady gait. LKN 3/16 pm when going to bed and symptoms started since 3 am. OOW for TNK. NIHSS 5. CTH with chronic right BG infarct. CTA with LPCA occlusion, imaging viewed on PACS and discussed with radiologist. Neuro endovasular team notified and discussed patient case with Dr. Aly. LPCA is occluded but it might be atretic in nature and no vision loss at this time so no acute intervention. Patient to be admitted for stroke work up.  SBP 250s, his symptoms might be blood pressure related and recrudescence of prior stroke. His right eye vision loss could also be amaurosis fugax or PRES. Daily headaches can be signs of severe hypertension vs vasculitis vs migraines/CADASIL.  Ddx: Acute ischemic stroke, hypertensive emergency, recrudescense,   Stroke etiology pending work  up; small vessel, cardioembolic (in the setting of chronic uncontrolled HTN), vascular risk factors, vasculitis, CADASIL      - Admit to stroke pathway, MRI brain, Echo, A1c, lipid panel, telemetry. Check UDS  - Permissive HTN for now, BP no greater than 220/120 mmHg, gradual reduction   - Give  in ED and continue  ASA 81 mg daily  - Start statin   - Check autoimmune/inflammatory causes:  NISHA, C3/C4/C50, ANCA, SSA, SSB, RF, ACE, ESR, CRP  - Sent hypercoagulable/thrombosis panel before starting any AC  - Migraine cocktail as needed (avoid NSAIDS, Triptans, steroids and DHE)   - If above work up non revealing, consider LP with CSF analysis (glu, pro, WBC, RBC, C/S, flow, cytometry, ME panel, VDRL, EBV/CMV/HSV/VZV PCR, WNV, Lyme, ACE, JCV)   - Vascular risk factor management outpatient with PCP, A1c goal <6.5, LDL goal <70  - Consider ziopatch outpatient with cardiology   - Consider checking DSA or MRA vessel wall imaging and NOTCH 3 gene outpatient   - PT/OT/ST  -  on smoking cessation and healthy lifestyle.       Thrombolytic Decision: Patient not a candidate. Unclear time of onset outside appropriate time window. and possible recent subacute or chronic stroke      DEANNA Ashraf will need follow up in in 6 weeks with neurovascular attending or advance practitioner. He will not require outpatient neurological testing.    History of Present Illness     Reason for Consult / Principal Problem: left sided weakness, ataxia  Hx and PE limited by: none  Patient last known well: prior to going to bed on 3/16 at 9 pm  Stroke alert called: 12:09 pm  Neurology time of arrival: immediate   HPI: DEANNA Ashraf is a 33 y.o. right handed male with possible hypertension but not on any meds who presents with left sided weakness and unsteady gait.   Patient reports going to bed fine but woke up this morning at 3 am with unsteady gait.   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 It resolved  within a minutes. He has been having daily headaches 10/10 but he is not on any medications. He has not sen doctors for years. He continue to smokes 2 packs of cigs per week. Per drug use history but no longer using drugs and last use was years ago.   Patient is unable to participate more in the history due to severe nausea and vomiting. requesting to not repeat the questions and exam he has already participated for other providers. Zofran and hydralazine given but not improvement in symptoms.   He has some cold and warm sensations at home per wife but no actual illness, fever and no sick contacts.  Extensive history of stroke in maternal side.       Inpatient consult to Neurology  Consult performed by: Emily Sanchez MD  Consult ordered by: Velasquez Coon DO        Review of Systems  Constitutional symptoms: left weakness, no fever, no chills, no sweats.   Skin symptoms: no rash.   Eye:  vision changes/disturbances, Blurry vision  ENMT:   no dizziness  Respiratory symptoms: no shortness of breath, no cough.   Cardiovascular symptoms: no chest pain.   Gastrointestinal symptoms: no nausea, no vomiting, no diarrhea.   Hema/Lymph:    no problems  Endocrine:    no Cold intolerance, Heat intolerance  Immunologic:    no problems  Musculoskeletal symptoms: no back pain.  Integumentary:    no problems   Neurologic symptoms: headache, no dizziness, weakness, no altered level of consciousness, numbness, no tingling. Abnormal balance  Psych: No SI/HI  All other system reviewed and negative except positives noted above    Historical Information   Past Medical History:   Diagnosis Date    Hypertension      History reviewed. No pertinent surgical history.  Social History   Live with wife  Active smoker  Prior drug use     Social History     Substance and Sexual Activity   Alcohol Use None     Social History     Substance and Sexual Activity   Drug Use Not on file     E-Cigarette/Vaping     E-Cigarette/Vaping Substances      Social History     Tobacco Use   Smoking Status Not on file   Smokeless Tobacco Not on file     Family History: Mother side all have strokes. Grandmother passed away at young age.     Review of previous medical records was  completed. Not seeing doc for years    Meds/Allergies   all current active meds have been reviewed, current meds:   Current Facility-Administered Medications   Medication Dose Route Frequency    esmolol (BREVIBLOC) 2500 mg/250 mL IV infusion (premix)   mcg/kg/min Intravenous Titrated    esmolol (BREVIBLOC) IV bolus 40 mg  40 mg Intravenous Once    metoclopramide (REGLAN) injection 10 mg  10 mg Intravenous Once   , and PTA meds:   None       No Known Allergies    Objective   Vitals:Blood pressure (!) 235/110, pulse 77, temperature 98.2 °F (36.8 °C), temperature source Temporal, resp. rate 22, weight 85.5 kg (188 lb 7.9 oz), SpO2 100%.,There is no height or weight on file to calculate BMI.  No intake or output data in the 24 hours ending 03/17/24 1250    Invasive Devices:   Invasive Devices       Peripheral Intravenous Line  Duration             Peripheral IV 03/17/24 Left Antecubital <1 day                    Physical Exam  Modified PE as this is a video consultation:  Gen:   Crying and vomiting  HEENT:  No Septal deviation EOMI NCAT.  Resp:  Symmetric chest rise and patient in no obvious respiratory distress  MSK: ROM normal  Skin: No rash noted in visualized portion of this exam    Neurologic Exam  Awake, alert, oriented x 4 name, age, month ,year. No aphasia or confusion noted. Mild dysarthria. Patient is able to follow 2 and 3 step commands with ease  EOMI,  left facial droop, slightly correct with smile or talking,  decrease to LT on left face when wife help with exam, hearing intact to conversation,  no tongue deviation, symmetric shoulder shrug and side bending and neck rotation  Motor:  Intact antigravity x 4 extremities. Drift in bilateral arm and leg  Sensation: Decrease to  almost absent on left arm and leg but still slightly feels it.   Cerebellar: Unable to participate due to severe nausea and vomiting and requesting to stop the exam  Gait:  not tested       NIHSS:  1a.Level of Consciousness: 0 = Alert   1b. LOC Questions: 0 = Answers both correctly   1c. LOC Commands: 0 = Obeys both correctly   2. Best Gaze: 0 = Normal   3. Visual: 0 = No visual field loss   4. Facial Palsy: 1=Partial paralysis    5a. Motor Right Arm: 0=No drift, limb holds 90 (or 45) degrees for full 10 seconds   5b. Motor Left Arm: 1=Drift, limb holds 90 (or 45) degrees but drifts down before full 10 seconds: does not hit bed   6a. Motor Right Le=No drift, limb holds 90 (or 45) degrees for full 10 seconds   6b. Motor Left Le=Drift, limb holds 90 (or 45) degrees but drifts down before full 10 seconds: does not hit bed   7. Limb Ataxia:  UN = Untestable (amputation, fused joint)   8. Sensory: 1=Mild to moderate sensory loss; patient feels pinprick is less sharp or is dull on the affected side; there is a loss of superficial pain with pinprick but patient is aware He is being touched   9. Best Language:  0=No aphasia, normal   10. Dysarthria: 1=Mild to moderate, patient slurs at least some words and at worst, can be understood with some difficulty   11. Extinction and Inattention (formerly Neglect): 0=No abnormality   Total Score: 5     Time NIHSS was completed: 1:00    Modified Jess Score:  Unable to determine currently, will gather additional data    Lab Results: I have personally reviewed pertinent reports.  , CBC:   Results from last 7 days   Lab Units 24  1226   WBC Thousand/uL 7.01   RBC Million/uL 4.66   HEMOGLOBIN g/dL 12.9   HEMATOCRIT % 38.7   MCV fL 83   PLATELETS Thousands/uL 150   , BMP/CMP:   Results from last 7 days   Lab Units 24  1226   SODIUM mmol/L 134*   POTASSIUM mmol/L 3.7   CHLORIDE mmol/L 101   CO2 mmol/L 28   BUN mg/dL 17   CREATININE mg/dL 1.08   CALCIUM mg/dL 9.0   AST  U/L 13   ALT U/L 15   ALK PHOS U/L 54   EGFR ml/min/1.73sq m 88   , HgBA1C:   , Lipid Profile:   , Drug Screen:     Imaging Studies: I have personally reviewed pertinent reports.  , I have personally reviewed pertinent films in PACS, and I have personally reviewed pertinent films in PACS with a Radiologist.  EKG, Pathology, and Other Studies: I have personally reviewed pertinent reports.        Code Status: No Order      Counseling / Coordination of Care  Total Critical Care time spent 70 minutes excluding procedures, teaching and family updates.

## 2024-03-17 NOTE — ED NOTES
Patient requesting methadone dose, stating he did not take his AM dose. Provider aware.     Yudelka Gutierrez, RN  03/17/24 0071

## 2024-03-18 ENCOUNTER — APPOINTMENT (INPATIENT)
Dept: RADIOLOGY | Facility: HOSPITAL | Age: 34
DRG: 045 | End: 2024-03-18
Payer: COMMERCIAL

## 2024-03-18 ENCOUNTER — APPOINTMENT (INPATIENT)
Dept: NON INVASIVE DIAGNOSTICS | Facility: HOSPITAL | Age: 34
DRG: 045 | End: 2024-03-18
Payer: COMMERCIAL

## 2024-03-18 ENCOUNTER — HOSPITAL ENCOUNTER (INPATIENT)
Facility: HOSPITAL | Age: 34
LOS: 5 days | Discharge: HOME | DRG: 045 | End: 2024-03-23
Attending: EMERGENCY MEDICINE | Admitting: EMERGENCY MEDICINE
Payer: COMMERCIAL

## 2024-03-18 VITALS
TEMPERATURE: 99.2 F | RESPIRATION RATE: 12 BRPM | BODY MASS INDEX: 27.1 KG/M2 | HEART RATE: 101 BPM | SYSTOLIC BLOOD PRESSURE: 231 MMHG | WEIGHT: 182.98 LBS | OXYGEN SATURATION: 96 % | HEIGHT: 69 IN | DIASTOLIC BLOOD PRESSURE: 120 MMHG

## 2024-03-18 DIAGNOSIS — F17.200 SMOKING: ICD-10-CM

## 2024-03-18 DIAGNOSIS — F11.90 METHADONE USE: ICD-10-CM

## 2024-03-18 DIAGNOSIS — I71.23 ANEURYSM OF DESCENDING THORACIC AORTA WITHOUT RUPTURE (HCC): ICD-10-CM

## 2024-03-18 DIAGNOSIS — I63.9 STROKE (HCC): ICD-10-CM

## 2024-03-18 DIAGNOSIS — I63.81 CEREBROVASCULAR ACCIDENT (CVA) DUE TO OCCLUSION OF SMALL ARTERY (HCC): ICD-10-CM

## 2024-03-18 DIAGNOSIS — Q61.3 POLYCYSTIC KIDNEY DISEASE: ICD-10-CM

## 2024-03-18 DIAGNOSIS — I1A.0 RESISTANT HYPERTENSION: ICD-10-CM

## 2024-03-18 DIAGNOSIS — I67.1 ANEURYSM OF ANTERIOR COMMUNICATING ARTERY: ICD-10-CM

## 2024-03-18 DIAGNOSIS — I71.9 AORTIC ANEURYSM (HCC): ICD-10-CM

## 2024-03-18 DIAGNOSIS — R53.1 ACUTE LEFT-SIDED WEAKNESS: ICD-10-CM

## 2024-03-18 DIAGNOSIS — I16.1 HYPERTENSIVE EMERGENCY: Primary | ICD-10-CM

## 2024-03-18 PROBLEM — Z91.89 AT HIGH RISK FOR SKIN BREAKDOWN: Status: ACTIVE | Noted: 2024-03-18

## 2024-03-18 PROBLEM — Z91.89 AT HIGH RISK FOR VENOUS THROMBOEMBOLISM (VTE): Status: ACTIVE | Noted: 2024-03-18

## 2024-03-18 PROBLEM — Z91.89 AT RISK FOR CONSTIPATION: Status: ACTIVE | Noted: 2024-03-18

## 2024-03-18 PROBLEM — R29.90 STROKE-LIKE SYMPTOMS: Status: ACTIVE | Noted: 2024-03-18

## 2024-03-18 PROBLEM — Z91.89 AT RISK FOR ALTERED URINARY ELIMINATION: Status: ACTIVE | Noted: 2024-03-18

## 2024-03-18 PROBLEM — Z74.09 IMPAIRED MOBILITY AND ACTIVITIES OF DAILY LIVING: Status: ACTIVE | Noted: 2024-03-18

## 2024-03-18 PROBLEM — Z78.9 IMPAIRED MOBILITY AND ACTIVITIES OF DAILY LIVING: Status: ACTIVE | Noted: 2024-03-18

## 2024-03-18 LAB
ALBUMIN SERPL BCP-MCNC: 4.2 G/DL (ref 3.5–5)
ALP SERPL-CCNC: 66 U/L (ref 34–104)
ALT SERPL W P-5'-P-CCNC: 17 U/L (ref 7–52)
ANA SER QL IA: NEGATIVE
ANA SER QL IA: NEGATIVE
ANION GAP SERPL CALCULATED.3IONS-SCNC: 12 MMOL/L (ref 4–13)
AORTIC ROOT: 3.2 CM
APICAL FOUR CHAMBER EJECTION FRACTION: 58 %
ASCENDING AORTA: 2.9 CM
AST SERPL W P-5'-P-CCNC: 17 U/L (ref 13–39)
ATRIAL RATE: 105 BPM
ATRIAL RATE: 83 BPM
BASOPHILS # BLD AUTO: 0.01 THOUSANDS/ÂΜL (ref 0–0.1)
BASOPHILS NFR BLD AUTO: 0 % (ref 0–1)
BILIRUB SERPL-MCNC: 0.6 MG/DL (ref 0.2–1)
BSA FOR ECHO PROCEDURE: 1.98 M2
BUN SERPL-MCNC: 18 MG/DL (ref 5–25)
CA-I BLD-SCNC: 1.16 MMOL/L (ref 1.12–1.32)
CALCIUM SERPL-MCNC: 9.2 MG/DL (ref 8.4–10.2)
CHLORIDE SERPL-SCNC: 100 MMOL/L (ref 96–108)
CHOLEST SERPL-MCNC: 159 MG/DL
CO2 SERPL-SCNC: 25 MMOL/L (ref 21–32)
CREAT SERPL-MCNC: 1.07 MG/DL (ref 0.6–1.3)
DEPRECATED AT III PPP: 88 % OF NORMAL (ref 92–136)
DEPRECATED AT III PPP: 98 % OF NORMAL (ref 92–136)
E WAVE DECELERATION TIME: 161 MS
E/A RATIO: 0.69
EOSINOPHIL # BLD AUTO: 0 THOUSAND/ÂΜL (ref 0–0.61)
EOSINOPHIL NFR BLD AUTO: 0 % (ref 0–6)
ERYTHROCYTE [DISTWIDTH] IN BLOOD BY AUTOMATED COUNT: 13.4 % (ref 11.6–15.1)
EST. AVERAGE GLUCOSE BLD GHB EST-MCNC: 108 MG/DL
FRACTIONAL SHORTENING: 31 (ref 28–44)
GFR SERPL CREATININE-BSD FRML MDRD: 89 ML/MIN/1.73SQ M
GLUCOSE SERPL-MCNC: 112 MG/DL (ref 65–140)
HBA1C MFR BLD: 5.4 %
HCT VFR BLD AUTO: 41.6 % (ref 36.5–49.3)
HDLC SERPL-MCNC: 42 MG/DL
HGB BLD-MCNC: 13.8 G/DL (ref 12–17)
IMM GRANULOCYTES # BLD AUTO: 0.03 THOUSAND/UL (ref 0–0.2)
IMM GRANULOCYTES NFR BLD AUTO: 0 % (ref 0–2)
INTERVENTRICULAR SEPTUM IN DIASTOLE (PARASTERNAL SHORT AXIS VIEW): 1.4 CM
INTERVENTRICULAR SEPTUM: 1.4 CM (ref 0.6–1.1)
LAAS-AP2: 23.8 CM2
LAAS-AP4: 20.3 CM2
LDLC SERPL CALC-MCNC: 110 MG/DL (ref 0–100)
LEFT ATRIUM SIZE: 3.6 CM
LEFT ATRIUM VOLUME (MOD BIPLANE): 72 ML
LEFT ATRIUM VOLUME INDEX (MOD BIPLANE): 36.2 ML/M2
LEFT INTERNAL DIMENSION IN SYSTOLE: 3.3 CM (ref 2.1–4)
LEFT VENTRICULAR INTERNAL DIMENSION IN DIASTOLE: 4.8 CM (ref 3.5–6)
LEFT VENTRICULAR POSTERIOR WALL IN END DIASTOLE: 1.3 CM
LEFT VENTRICULAR STROKE VOLUME: 64 ML
LVSV (TEICH): 64 ML
LYMPHOCYTES # BLD AUTO: 1.27 THOUSANDS/ÂΜL (ref 0.6–4.47)
LYMPHOCYTES NFR BLD AUTO: 18 % (ref 14–44)
MAGNESIUM SERPL-MCNC: 2.1 MG/DL (ref 1.9–2.7)
MCH RBC QN AUTO: 27.5 PG (ref 26.8–34.3)
MCHC RBC AUTO-ENTMCNC: 33.2 G/DL (ref 31.4–37.4)
MCV RBC AUTO: 83 FL (ref 82–98)
MONOCYTES # BLD AUTO: 0.43 THOUSAND/ÂΜL (ref 0.17–1.22)
MONOCYTES NFR BLD AUTO: 6 % (ref 4–12)
MV E'TISSUE VEL-SEP: 3 CM/S
MV PEAK A VEL: 0.87 M/S
MV PEAK E VEL: 60 CM/S
MV STENOSIS PRESSURE HALF TIME: 47 MS
MV VALVE AREA P 1/2 METHOD: 4.68
NEUTROPHILS # BLD AUTO: 5.35 THOUSANDS/ÂΜL (ref 1.85–7.62)
NEUTS SEG NFR BLD AUTO: 76 % (ref 43–75)
NRBC BLD AUTO-RTO: 0 /100 WBCS
P AXIS: 58 DEGREES
P AXIS: 77 DEGREES
PHOSPHATE SERPL-MCNC: 3.7 MG/DL (ref 2.7–4.5)
PLATELET # BLD AUTO: 171 THOUSANDS/UL (ref 149–390)
PMV BLD AUTO: 11.2 FL (ref 8.9–12.7)
POTASSIUM SERPL-SCNC: 3.5 MMOL/L (ref 3.5–5.3)
PR INTERVAL: 150 MS
PR INTERVAL: 150 MS
PROT C AG ACT/NOR PPP IA: 96 % OF NORMAL (ref 60–150)
PROT C AG ACT/NOR PPP IA: 96 % OF NORMAL (ref 60–150)
PROT S ACT/NOR PPP: 54 % (ref 71–117)
PROT S ACT/NOR PPP: 61 % (ref 71–117)
PROT SERPL-MCNC: 6.6 G/DL (ref 6.4–8.4)
QRS AXIS: 3 DEGREES
QRS AXIS: 43 DEGREES
QRSD INTERVAL: 108 MS
QRSD INTERVAL: 108 MS
QT INTERVAL: 408 MS
QT INTERVAL: 450 MS
QTC INTERVAL: 528 MS
QTC INTERVAL: 540 MS
RA PRESSURE ESTIMATED: 3 MMHG
RBC # BLD AUTO: 5.01 MILLION/UL (ref 3.88–5.62)
RHEUMATOID FACT SER QL LA: NEGATIVE
RIGHT ATRIUM AREA SYSTOLE A4C: 14.7 CM2
RIGHT VENTRICLE ID DIMENSION: 3.2 CM
SL CV LEFT ATRIUM LENGTH A2C: 5.4 CM
SL CV LV EF: 65
SL CV PED ECHO LEFT VENTRICLE DIASTOLIC VOLUME (MOD BIPLANE) 2D: 109 ML
SL CV PED ECHO LEFT VENTRICLE SYSTOLIC VOLUME (MOD BIPLANE) 2D: 45 ML
SODIUM SERPL-SCNC: 137 MMOL/L (ref 135–147)
T WAVE AXIS: 50 DEGREES
T WAVE AXIS: 52 DEGREES
TRICUSPID ANNULAR PLANE SYSTOLIC EXCURSION: 2.9 CM
TRICUSPID VALVE PEAK REGURGITATION VELOCITY: 1.39 M/S
TRIGL SERPL-MCNC: 33 MG/DL
VENTRICULAR RATE: 105 BPM
VENTRICULAR RATE: 83 BPM
WBC # BLD AUTO: 7.09 THOUSAND/UL (ref 4.31–10.16)

## 2024-03-18 PROCEDURE — 86162 COMPLEMENT TOTAL (CH50): CPT

## 2024-03-18 PROCEDURE — 82330 ASSAY OF CALCIUM: CPT | Performed by: PHYSICIAN ASSISTANT

## 2024-03-18 PROCEDURE — 85705 THROMBOPLASTIN INHIBITION: CPT

## 2024-03-18 PROCEDURE — 85303 CLOT INHIBIT PROT C ACTIVITY: CPT

## 2024-03-18 PROCEDURE — 85732 THROMBOPLASTIN TIME PARTIAL: CPT

## 2024-03-18 PROCEDURE — 82164 ANGIOTENSIN I ENZYME TEST: CPT

## 2024-03-18 PROCEDURE — 93975 VASCULAR STUDY: CPT | Performed by: SURGERY

## 2024-03-18 PROCEDURE — 85306 CLOT INHIBIT PROT S FREE: CPT

## 2024-03-18 PROCEDURE — 85670 THROMBIN TIME PLASMA: CPT

## 2024-03-18 PROCEDURE — 93306 TTE W/DOPPLER COMPLETE: CPT | Performed by: INTERNAL MEDICINE

## 2024-03-18 PROCEDURE — 99233 SBSQ HOSP IP/OBS HIGH 50: CPT | Performed by: PSYCHIATRY & NEUROLOGY

## 2024-03-18 PROCEDURE — 86235 NUCLEAR ANTIGEN ANTIBODY: CPT

## 2024-03-18 PROCEDURE — 85025 COMPLETE CBC W/AUTO DIFF WBC: CPT | Performed by: PHYSICIAN ASSISTANT

## 2024-03-18 PROCEDURE — NC001 PR NO CHARGE: Performed by: PSYCHIATRY & NEUROLOGY

## 2024-03-18 PROCEDURE — 80053 COMPREHEN METABOLIC PANEL: CPT | Performed by: PHYSICIAN ASSISTANT

## 2024-03-18 PROCEDURE — 85613 RUSSELL VIPER VENOM DILUTED: CPT

## 2024-03-18 PROCEDURE — 85300 ANTITHROMBIN III ACTIVITY: CPT

## 2024-03-18 PROCEDURE — 97163 PT EVAL HIGH COMPLEX 45 MIN: CPT

## 2024-03-18 PROCEDURE — 86037 ANCA TITER EACH ANTIBODY: CPT

## 2024-03-18 PROCEDURE — 93010 ELECTROCARDIOGRAM REPORT: CPT | Performed by: INTERNAL MEDICINE

## 2024-03-18 PROCEDURE — 74174 CTA ABD&PLVS W/CONTRAST: CPT

## 2024-03-18 PROCEDURE — 86147 CARDIOLIPIN ANTIBODY EA IG: CPT

## 2024-03-18 PROCEDURE — 84100 ASSAY OF PHOSPHORUS: CPT | Performed by: PHYSICIAN ASSISTANT

## 2024-03-18 PROCEDURE — 99255 IP/OBS CONSLTJ NEW/EST HI 80: CPT | Performed by: PHYSICAL MEDICINE & REHABILITATION

## 2024-03-18 PROCEDURE — 86038 ANTINUCLEAR ANTIBODIES: CPT

## 2024-03-18 PROCEDURE — 86430 RHEUMATOID FACTOR TEST QUAL: CPT

## 2024-03-18 PROCEDURE — 92610 EVALUATE SWALLOWING FUNCTION: CPT

## 2024-03-18 PROCEDURE — 85305 CLOT INHIBIT PROT S TOTAL: CPT

## 2024-03-18 PROCEDURE — 83520 IMMUNOASSAY QUANT NOS NONAB: CPT

## 2024-03-18 PROCEDURE — 93975 VASCULAR STUDY: CPT

## 2024-03-18 PROCEDURE — 97167 OT EVAL HIGH COMPLEX 60 MIN: CPT

## 2024-03-18 PROCEDURE — 71275 CT ANGIOGRAPHY CHEST: CPT

## 2024-03-18 PROCEDURE — 83735 ASSAY OF MAGNESIUM: CPT | Performed by: PHYSICIAN ASSISTANT

## 2024-03-18 PROCEDURE — 93005 ELECTROCARDIOGRAM TRACING: CPT

## 2024-03-18 PROCEDURE — 86146 BETA-2 GLYCOPROTEIN ANTIBODY: CPT

## 2024-03-18 PROCEDURE — 83036 HEMOGLOBIN GLYCOSYLATED A1C: CPT | Performed by: PHYSICIAN ASSISTANT

## 2024-03-18 PROCEDURE — 93306 TTE W/DOPPLER COMPLETE: CPT

## 2024-03-18 PROCEDURE — 80061 LIPID PANEL: CPT | Performed by: PHYSICIAN ASSISTANT

## 2024-03-18 RX ORDER — CHLORHEXIDINE GLUCONATE ORAL RINSE 1.2 MG/ML
15 SOLUTION DENTAL EVERY 12 HOURS SCHEDULED
Status: DISCONTINUED | OUTPATIENT
Start: 2024-03-18 | End: 2024-03-23 | Stop reason: HOSPADM

## 2024-03-18 RX ORDER — ATORVASTATIN CALCIUM 40 MG/1
40 TABLET, FILM COATED ORAL EVERY EVENING
Status: DISCONTINUED | OUTPATIENT
Start: 2024-03-18 | End: 2024-03-23 | Stop reason: HOSPADM

## 2024-03-18 RX ORDER — METHADONE HYDROCHLORIDE 10 MG/1
110 TABLET ORAL EVERY EVENING
Status: DISCONTINUED | OUTPATIENT
Start: 2024-03-18 | End: 2024-03-18

## 2024-03-18 RX ORDER — NICARDIPINE HYDROCHLORIDE 2.5 MG/ML
INJECTION INTRAVENOUS
Status: DISPENSED
Start: 2024-03-18 | End: 2024-03-18

## 2024-03-18 RX ORDER — ASPIRIN 81 MG/1
81 TABLET, CHEWABLE ORAL DAILY
Status: CANCELLED | OUTPATIENT
Start: 2024-03-18

## 2024-03-18 RX ORDER — AMLODIPINE BESYLATE 10 MG/1
10 TABLET ORAL DAILY
Status: DISCONTINUED | OUTPATIENT
Start: 2024-03-18 | End: 2024-03-23 | Stop reason: HOSPADM

## 2024-03-18 RX ORDER — ATORVASTATIN CALCIUM 40 MG/1
40 TABLET, FILM COATED ORAL EVERY EVENING
Status: CANCELLED | OUTPATIENT
Start: 2024-03-18

## 2024-03-18 RX ORDER — LABETALOL HYDROCHLORIDE 5 MG/ML
10 INJECTION, SOLUTION INTRAVENOUS EVERY 4 HOURS PRN
Status: DISCONTINUED | OUTPATIENT
Start: 2024-03-18 | End: 2024-03-18

## 2024-03-18 RX ORDER — POTASSIUM CHLORIDE 20 MEQ/1
40 TABLET, EXTENDED RELEASE ORAL ONCE
Status: COMPLETED | OUTPATIENT
Start: 2024-03-18 | End: 2024-03-18

## 2024-03-18 RX ORDER — ENOXAPARIN SODIUM 100 MG/ML
40 INJECTION SUBCUTANEOUS DAILY
Status: DISCONTINUED | OUTPATIENT
Start: 2024-03-18 | End: 2024-03-23 | Stop reason: HOSPADM

## 2024-03-18 RX ORDER — METHADONE HYDROCHLORIDE 10 MG/1
120 TABLET ORAL EVERY EVENING
Status: DISCONTINUED | OUTPATIENT
Start: 2024-03-18 | End: 2024-03-23 | Stop reason: HOSPADM

## 2024-03-18 RX ORDER — METHADONE HYDROCHLORIDE 10 MG/1
110 TABLET ORAL EVERY EVENING
Status: CANCELLED | OUTPATIENT
Start: 2024-03-18

## 2024-03-18 RX ORDER — ENOXAPARIN SODIUM 100 MG/ML
40 INJECTION SUBCUTANEOUS DAILY
Status: CANCELLED | OUTPATIENT
Start: 2024-03-18

## 2024-03-18 RX ORDER — CARVEDILOL 6.25 MG/1
6.25 TABLET ORAL 2 TIMES DAILY WITH MEALS
Status: DISCONTINUED | OUTPATIENT
Start: 2024-03-18 | End: 2024-03-19

## 2024-03-18 RX ORDER — LABETALOL HYDROCHLORIDE 5 MG/ML
10 INJECTION, SOLUTION INTRAVENOUS EVERY 4 HOURS
Status: DISCONTINUED | OUTPATIENT
Start: 2024-03-18 | End: 2024-03-19

## 2024-03-18 RX ORDER — LABETALOL HYDROCHLORIDE 5 MG/ML
10 INJECTION, SOLUTION INTRAVENOUS EVERY 4 HOURS PRN
Status: CANCELLED | OUTPATIENT
Start: 2024-03-18

## 2024-03-18 RX ORDER — METHADONE HYDROCHLORIDE 10 MG/1
105 TABLET ORAL
Status: DISCONTINUED | OUTPATIENT
Start: 2024-03-18 | End: 2024-03-18

## 2024-03-18 RX ORDER — ASPIRIN 81 MG/1
81 TABLET, CHEWABLE ORAL DAILY
Status: DISCONTINUED | OUTPATIENT
Start: 2024-03-18 | End: 2024-03-23 | Stop reason: HOSPADM

## 2024-03-18 RX ORDER — CHLORHEXIDINE GLUCONATE ORAL RINSE 1.2 MG/ML
15 SOLUTION DENTAL EVERY 12 HOURS SCHEDULED
Status: CANCELLED | OUTPATIENT
Start: 2024-03-18

## 2024-03-18 RX ORDER — METHADONE HYDROCHLORIDE 10 MG/ML
105 CONCENTRATE ORAL
Status: CANCELLED | OUTPATIENT
Start: 2024-03-18

## 2024-03-18 RX ORDER — HYDROMORPHONE HCL IN WATER/PF 6 MG/30 ML
0.2 PATIENT CONTROLLED ANALGESIA SYRINGE INTRAVENOUS EVERY 8 HOURS PRN
Status: DISCONTINUED | OUTPATIENT
Start: 2024-03-18 | End: 2024-03-18

## 2024-03-18 RX ORDER — ACETAMINOPHEN 325 MG/1
650 TABLET ORAL EVERY 6 HOURS PRN
Status: DISCONTINUED | OUTPATIENT
Start: 2024-03-18 | End: 2024-03-19

## 2024-03-18 RX ADMIN — ATORVASTATIN CALCIUM 40 MG: 40 TABLET, FILM COATED ORAL at 17:15

## 2024-03-18 RX ADMIN — NICARDIPINE HYDROCHLORIDE 1 MG/HR: 2.5 INJECTION, SOLUTION INTRAVENOUS at 19:31

## 2024-03-18 RX ADMIN — CHLORHEXIDINE GLUCONATE 15 ML: 1.2 SOLUTION ORAL at 09:56

## 2024-03-18 RX ADMIN — POTASSIUM CHLORIDE 40 MEQ: 1500 TABLET, EXTENDED RELEASE ORAL at 07:37

## 2024-03-18 RX ADMIN — CHLORHEXIDINE GLUCONATE 15 ML: 1.2 SOLUTION ORAL at 20:33

## 2024-03-18 RX ADMIN — METHADONE HYDROCHLORIDE 105 MG: 10 TABLET ORAL at 07:37

## 2024-03-18 RX ADMIN — LABETALOL HYDROCHLORIDE 10 MG: 5 INJECTION, SOLUTION INTRAVENOUS at 20:33

## 2024-03-18 RX ADMIN — METHADONE HYDROCHLORIDE 120 MG: 10 TABLET ORAL at 18:22

## 2024-03-18 RX ADMIN — HYDROMORPHONE HYDROCHLORIDE 0.2 MG: 0.2 INJECTION, SOLUTION INTRAMUSCULAR; INTRAVENOUS; SUBCUTANEOUS at 19:02

## 2024-03-18 RX ADMIN — NICARDIPINE HYDROCHLORIDE 7.5 MG/HR: 2.5 INJECTION, SOLUTION INTRAVENOUS at 05:58

## 2024-03-18 RX ADMIN — NICARDIPINE HYDROCHLORIDE 12.5 MG/HR: 2.5 INJECTION, SOLUTION INTRAVENOUS at 12:36

## 2024-03-18 RX ADMIN — NICARDIPINE HYDROCHLORIDE 5 MG/HR: 2.5 INJECTION, SOLUTION INTRAVENOUS at 03:14

## 2024-03-18 RX ADMIN — IOHEXOL 100 ML: 350 INJECTION, SOLUTION INTRAVENOUS at 04:45

## 2024-03-18 RX ADMIN — ACETAMINOPHEN 650 MG: 325 TABLET, FILM COATED ORAL at 19:30

## 2024-03-18 RX ADMIN — LABETALOL HYDROCHLORIDE 10 MG: 5 INJECTION, SOLUTION INTRAVENOUS at 15:17

## 2024-03-18 RX ADMIN — AMLODIPINE BESYLATE 10 MG: 10 TABLET ORAL at 11:02

## 2024-03-18 RX ADMIN — ENOXAPARIN SODIUM 40 MG: 40 INJECTION SUBCUTANEOUS at 09:56

## 2024-03-18 RX ADMIN — ACETAMINOPHEN 650 MG: 325 TABLET, FILM COATED ORAL at 12:58

## 2024-03-18 RX ADMIN — CARVEDILOL 6.25 MG: 6.25 TABLET, FILM COATED ORAL at 17:16

## 2024-03-18 RX ADMIN — NICARDIPINE HYDROCHLORIDE 7.5 MG/HR: 2.5 INJECTION, SOLUTION INTRAVENOUS at 10:02

## 2024-03-18 RX ADMIN — ASPIRIN 81 MG CHEWABLE TABLET 81 MG: 81 TABLET CHEWABLE at 09:56
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
## 2024-03-18 NOTE — ASSESSMENT & PLAN NOTE
"Presented with left sided numbness and weakness since about 3 am today; Of note yesterday had visual disturbance of the left eye yesterday afternoon that aborted spontaneously but had no issues until this AM  Initial CTH   \"Old lacunar infarction within the right basal ganglia. Mild surrounding low density which may represent chronic gliosis. However, the possibility of subacute ivelisse-infarct ischemia is not excluded\"  Initial CTA:  \"occlusion of the left posterior cerebral artery proximally with some distal reconstitution; anterior M2 branches on the left demonstrates moderate atherosclerotic narrowing; 2 mm aneurysm of the anterior communicating artery at the junction of the left A1 and A2 segments.\"  TNK: Outside of window  Neurology following  Spoke with neurology who initially spoke with neuro IR and deemed not a candidate  Due to patient's significant history I spoke with NCC and think he would be better served at tertiary center therefore will transfer to [facility]  Given significant history, differential includes vasculitis (see plan below)  Current Neuro Exam:  Left sided numbess, left facial droop, slowed speech, LUE 4-/5 LLE 4/5, Appears to beLeft sided ataxia but difficult to assess given weakness  Plan:  Continue stroke pathway  MRI ordered, also would benefit from MRA  Antiplatelet Plan: given aspirin 325 mg now then daily  TTE pending  High Intensity Statin  Lipid Panel pending  Blood pressure goal:  < 200  Continue telemetry  PT/OT/Speech following  PMR consult  Hemoglobin A1C: Pending  Send hypercoagulable and vasculitis workup   "

## 2024-03-18 NOTE — CONSULTS
PHYSICAL MEDICINE AND REHABILITATION CONSULT NOTE  DEANNA Ashraf 33 y.o. male MRN: [id number]  Unit/Bed#: ICU 03 Encounter: [id number]    Requested by (Physician/Service): Apolonia Gonzalez DO  Reason for Consultation:  Assessment of rehabilitation needs  Reason for Hospitalization:  Acute left-sided weakness  Rehabilitation Diagnosis: Acute stroke, right brain, left hemibody    History of Present Illness:  DEANNA Ashraf is a 33 y.o. male who  has a past medical history of Hypertension. who presented to the [facility] on 3/17/24 with acute onset left sided weakness and gait dysfunction. Initial NIHSS was 5. CT head wo contrast showed no evidence of acute hemorrhage, however did show a chronic right basal ganglia infarct with surrounding low-density concerning for possible ivelisse-infarct ischemia. CTA head/neck showed occlusion of the left PCA with some distal reconstitution. He did not receive TNKase. He was transferred to [facility] for consideration of neuro-intervention, however ultimately he did not undergo thrombectomy. His course has been complicated by severe hypertension requiring nicardipine infusion.     Subjective: He is seen at bedside, he is currently feeling well overall, he says that his headache has significantly improved. We discussed post-acute rehabilitation options at length during this visit. He says that he is hopeful he may be able to discharge directly home with outpatient therapies, however he is open to the idea of inpatient post-acute rehabilitation if indicated. He is eager to work with therapy.    PM&R consulted for rehabilitation recommendations.    Restrictions include:  none    Hospital Complications/Comorbidities:   Complications: As above  Comorbidities: As above    Morbid Obesity (BMI > 40) No     Last Weight Last BMI   Wt Readings from Last 1 Encounters:   03/18/24 82.6 kg (182 lb)    Body mass index is 26.88 kg/m².     Functional History:     Prior to Admission:      Functional Status: Patient was independent with mobility/ambulation, transfers, ADL's, IADL's.     Present:  Physical Therapy Occupational Therapy Speech Therapy   Pending Pending Minimal oropharyngeal dysphagia     Past Medical History:   Past Surgical History:   Family History:     Past Medical History:   Diagnosis Date    Hypertension     No past surgical history on file.  No family history on file.       Medications:    Current Facility-Administered Medications:     amLODIPine (NORVASC) tablet 10 mg, 10 mg, Oral, Daily, Mel Pressley MD, 10 mg at 03/18/24 1102    aspirin chewable tablet 81 mg, 81 mg, Oral, Daily, Best Corona PA-C, 81 mg at 03/18/24 0956    atorvastatin (LIPITOR) tablet 40 mg, 40 mg, Oral, QPM, Best Corona PA-C    carvedilol (COREG) tablet 6.25 mg, 6.25 mg, Oral, BID With Meals, Mel Pressley MD    chlorhexidine (PERIDEX) 0.12 % oral rinse 15 mL, 15 mL, Mouth/Throat, Q12H ARYA, Best Corona PA-C, 15 mL at 03/18/24 0956    enoxaparin (LOVENOX) subcutaneous injection 40 mg, 40 mg, Subcutaneous, Daily, Best Corona PA-C, 40 mg at 03/18/24 0956    labetalol (NORMODYNE) injection 10 mg, 10 mg, Intravenous, Q4H PRN, Best Corona PA-C    [START ON 3/19/2024] methadone (DOLOPHINE) tablet 105 mg, 105 mg, Oral, Daily With Breakfast, Kenji Arguelles DO    methadone (DOLOPHINE) tablet 120 mg, 120 mg, Oral, QPM, Kenji Arguelles DO    niCARdipine (CARDENE) 2.5 mg/mL injection **ADS Override Pull**, , , ,     niCARdipine (CARDENE) 25 mg (STANDARD CONCENTRATION) in sodium chloride 0.9% 250 mL, 1-15 mg/hr, Intravenous, Titrated, ALEN Mills, Last Rate: 100 mL/hr at 03/18/24 1004, 10 mg/hr at 03/18/24 1004    Allergies:   Allergies   Allergen Reactions    Haloperidol Hives and Other (See Comments)     Other reaction(s): Extrapyramidal Symptoms        Social History:    Social History     Socioeconomic History    Marital status: /Civil  Union     Spouse name: Not on file    Number of children: Not on file    Years of education: Not on file    Highest education level: Not on file   Occupational History    Not on file   Tobacco Use    Smoking status: Every Day     Current packs/day: 0.25     Average packs/day: 0.3 packs/day for 20.0 years (5.0 ttl pk-yrs)     Types: Cigarettes    Smokeless tobacco: Never   Vaping Use    Vaping status: Never Used   Substance and Sexual Activity    Alcohol use: Not Currently    Drug use: Not Currently    Sexual activity: Not on file   Other Topics Concern    Not on file   Social History Narrative    Not on file     Social Determinants of Health     Financial Resource Strain: Low Risk  (7/8/2021)    Received from [employer]    Overall Financial Resource Strain (CARDIA)     Difficulty of Paying Living Expenses: Not hard at all   Food Insecurity: No Food Insecurity (7/8/2021)    Received from [employer]    Hunger Vital Sign     Worried About Running Out of Food in the Last Year: Never true     Ran Out of Food in the Last Year: Never true   Transportation Needs: No Transportation Needs (7/8/2021)    Received from [employer]    PRAPARE - Transportation     Lack of Transportation (Medical): No     Lack of Transportation (Non-Medical): No   Physical Activity: Not on file   Stress: Not on file   Social Connections: Not on file   Intimate Partner Violence: Not on file   Housing Stability: Not on file      DEANNA Ashraf Lives with: mother and spouse .  He lives in a(n) single family home  The living area: can live on one level  There No steps to enter the home.    Patient/family's goals: Family member's home   The patient will not have 24 hour ARC Supervision/physical assistance: supervision/physical assistance available upon discharge.    Review of Systems: A 10-point review of systems was performed. Negative except as listed above.     Physical Exam:  Vital Signs:      Temp:  [97.7 °F (36.5 °C)-99.2 °F (37.3 °C)]  99.2 °F (37.3 °C)  HR:  [] 98  Resp:  [10-58] 22  BP: (129-258)/() 185/79   Intake/Output Summary (Last 24 hours) at 3/18/2024 1124  Last data filed at 3/18/2024 1000  Gross per 24 hour   Intake 528.33 ml   Output --   Net 528.33 ml        Laboratory:      Lab Results   Component Value Date    HGB 13.8 03/18/2024    HCT 41.6 03/18/2024    WBC 7.09 03/18/2024     Lab Results   Component Value Date    BUN 18 03/18/2024    K 3.5 03/18/2024     03/18/2024    CREATININE 1.07 03/18/2024     Lab Results   Component Value Date    PROTIME 14.6 (H) 03/17/2024    INR 1.10 03/17/2024        GEN: Patient seen resting comfortably in hospital bed, NAD  HEENT: NCAT; mucous membranes moist  CV: No extremity edema  PULM: Breathing comfortably on room air  ABD: Non-distended  SKIN: No obvious rashes or lesions on exposed skin  NEURO:  Awake and alert, oriented to person, place, time and situation, answering questions appropriately to context; able to follow multi-step commands with clear consistency  Speech is fluent and organized  CN II-XII: EOMI, sensation to light touch in >2 locations on face intact but slightly impaired on the left side compared to the right, symmetry with eyebrow raise but subtle left facial droop with smile activation, hearing intact to soft sound b/l, pharynx elevated with midline uvula, shoulder shrug intact, midline tongue  Sensation intact to light touch in large dermatomes b/l, however slightly impaired diffusely throughout his left side compared to his right  MMT (R/L): B 5/4; T 5/4; WE 5/4; FAbd 5/4; FF 5/4; HF 5/4; KE 5/4; DF 5/2; EHL 5/4; PF 5/4  No dysmetria on finger-to-nose, able to perform rapid alternating movements, though limited due to weakness on the left    Imaging: Reviewed  CTA dissection protocol chest/abdomen/pelvis    Result Date: 3/18/2024  Impression: 5.0 cm fusiform aneurysm of the descending aorta at the level of the hiatus. No evidence of dissection. Consider  vascular surgery referral. Multiple small bilateral renal cysts. The study was marked in EPIC for immediate notification. Workstation performed: [id number]     Assessment and Recommendations:  PM&R consulted for rehabilitation recommendations.     * Cerebrovascular accident (CVA) due to occlusion of left posterior cerebral artery (HCC)  Assessment & Plan  - Ongoing stroke work-up per neurology and primary service  - Neurology for post-stroke medical optimization  - Monitor closely for neurogenic bowel and bladder  - Delirium precautions:  - Maintain adequate nutrition  - Avoid sedating and anticholinergic medications as possible  - Promote regular sleep/wake cycles and proper sleep hygiene  - Monitor closely for left hemiparetic shoulder pain and post-stroke pain  - Spasticity: Left multipodus boot to be worn at night and while in bed during the day to prevent developing spasticity and contracture  - Pain: Per primary service.        At high risk for skin breakdown  Assessment & Plan  - Skin: Monitor for breakdown, frequent turns    At risk for constipation  Assessment & Plan  - Monitor closely for neurogenic bowel. Follow BMs and adjust bowel regimen to target soft, formed stools q1-2 days, per pt's regular schedule.      At risk for altered urinary elimination  Assessment & Plan  - Monitor closely for neurogenic bladder. Follow UOP and encourage spontaneous voids. If unable to void spontaneously, will monitor with PVR bladder scans and initiate ISC regimen.      Impaired mobility and activities of daily living  Assessment & Plan  - Given Mr. Ashraf' current level of medical complexity, available support system of family members and caregivers at home, expected tolerance for 3 hours of therapies 5 days per week, expected current level of function significantly below baseline based on presenting impairments and expected functional prognosis, expect that he will be an ideal candidate for inpatient rehabilitation  (IRF-level care). His PT and OT assessments are pending at which time we will have a better sense of his current functional ability; he may be able to progress to a point where he can discharge directly home, however expect at this point that he would likely benefit from post-acute rehabilitation at the IRF level.       At high risk for venous thromboembolism (VTE)  Assessment & Plan  - Recommend chemoprophylaxis for VTE when medically appropriate to do so.     Thank you for allowing the PM&R service to participate in the care of this patient. We will continue to follow DEANNA Ashraf's progress with you. Please do not hesitate to call with questions or concerns    Yony De Souza MD  Attending Physician  Physical Medicine and Rehabilitation  [facility] [address]
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
## 2024-03-18 NOTE — PLAN OF CARE
"  Problem: OCCUPATIONAL THERAPY ADULT  Goal: Performs self-care activities at highest level of function for planned discharge setting.  See evaluation for individualized goals.  Description: Treatment Interventions: ADL retraining, Functional transfer training, UE strengthening/ROM, Endurance training, Fine motor coordination activities, Activityengagement          See flowsheet documentation for full assessment, interventions and recommendations.   Note: Limitation: Decreased ADL status, Decreased UE strength, Decreased Safe judgement during ADL, Decreased endurance  Prognosis: Good  Assessment: Pt is a 33 y.o male who was admitted to hospitals on 3/18/24 after CVA due to occlusion of L posterior cerebral artery,  has a past medical history of Hypertension.  Prior to admit, pt with independent with ADLs/IADLs, drives independently. Pt does not use mobility aid at baseline, no fall history. Pt currently lives in a 1 story house with parents and spouse, pt reports living in basement, however will have 1st floor setup during recovery. Bathroom located on main floor contains grab bars in tub/shower, near toilet. 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 Currently pt requires supervision-min A for UB ADLs/bed mobility, mod A x1 transfers/functional mobility with HHA to bedside chair. Pt demonstrated an ataxic, unsteady gait during functional mobility, having difficulty lifting RLE, weightshifting to LLE due to weakness/decreased coordination. Pt is limited by decreased LUE/LLE strength/coordination, balance, endurance, activity tolerance, and safety awareness, limiting pt's ability to safely engage in functional tasks. Pt would continue to benefit from acute OT services, addressing below listed goals. Upon d/c, recommend lvl 1 resources.     Rehab Resource Intensity Level, OT: I (Maximum Resource Intensity)          "

## 2024-03-18 NOTE — ASSESSMENT & PLAN NOTE
- Ongoing stroke work-up per neurology and primary service  - Neurology for post-stroke medical optimization  - Monitor closely for neurogenic bowel and bladder  - Delirium precautions:  - Maintain adequate nutrition  - Avoid sedating and anticholinergic medications as possible  - Promote regular sleep/wake cycles and proper sleep hygiene  - Monitor closely for left hemiparetic shoulder pain and post-stroke pain  - Spasticity: Left multipodus boot to be worn at night and while in bed during the day to prevent developing spasticity and contracture  - Pain: Per primary service.

## 2024-03-18 NOTE — PLAN OF CARE
Problem: PAIN - ADULT  Goal: Verbalizes/displays adequate comfort level or baseline comfort level  Description: Interventions:  - Encourage patient to monitor pain and request assistance  - Assess pain using appropriate pain scale  - Administer analgesics based on type and severity of pain and evaluate response  - Implement non-pharmacological measures as appropriate and evaluate response  - Consider cultural and social influences on pain and pain management  - Notify physician/advanced practitioner if interventions unsuccessful or patient reports new pain  Outcome: Progressing     Problem: INFECTION - ADULT  Goal: Absence or prevention of progression during hospitalization  Description: INTERVENTIONS:  - Assess and monitor for signs and symptoms of infection  - Monitor lab/diagnostic results  - Monitor all insertion sites, i.e. indwelling lines, tubes, and drains  - Monitor endotracheal if appropriate and nasal secretions for changes in amount and color  - Patten appropriate cooling/warming therapies per order  - Administer medications as ordered  - Instruct and encourage patient and family to use good hand hygiene technique  - Identify and instruct in appropriate isolation precautions for identified infection/condition  Outcome: Progressing  Goal: Absence of fever/infection during neutropenic period  Description: INTERVENTIONS:  - Monitor WBC    Outcome: Progressing     Problem: SAFETY ADULT  Goal: Patient will remain free of falls  Description: INTERVENTIONS:  - Educate patient/family on patient safety including physical limitations  - Instruct patient to call for assistance with activity   - Consult OT/PT to assist with strengthening/mobility   - Keep Call bell within reach  - Keep bed low and locked with side rails adjusted as appropriate  - Keep care items and personal belongings within reach  - Initiate and maintain comfort rounds  - Make Fall Risk Sign visible to staff  - Offer Toileting every 3 Hours,  in advance of need  - Initiate/Maintain bed alarm  - Obtain necessary fall risk management equipment:   - Apply yellow socks and bracelet for high fall risk patients  - Consider moving patient to room near nurses station  Outcome: Progressing  Goal: Maintain or return to baseline ADL function  Description: INTERVENTIONS:  -  Assess patient's ability to carry out ADLs; assess patient's baseline for ADL function and identify physical deficits which impact ability to perform ADLs (bathing, care of mouth/teeth, toileting, grooming, dressing, etc.)  - Assess/evaluate cause of self-care deficits   - Assess range of motion  - Assess patient's mobility; develop plan if impaired  - Assess patient's need for assistive devices and provide as appropriate  - Encourage maximum independence but intervene and supervise when necessary  - Involve family in performance of ADLs  - Assess for home care needs following discharge   - Consider OT consult to assist with ADL evaluation and planning for discharge  - Provide patient education as appropriate  Outcome: Progressing  Goal: Maintains/Returns to pre admission functional level  Description: INTERVENTIONS:  - Perform AM-PAC 6 Click Basic Mobility/ Daily Activity assessment daily.  - Set and communicate daily mobility goal to care team and patient/family/caregiver.   - Collaborate with rehabilitation services on mobility goals if consulted  - Perform Range of Motion 3 times a day.  - Reposition patient every 3 hours.  - Dangle patient 3 times a day  - Stand patient 3 times a day  - Ambulate patient 3 times a day  - Out of bed to chair 3 times a day   - Out of bed for meals 3 times a day  - Out of bed for toileting  - Record patient progress and toleration of activity level   Outcome: Progressing     Problem: DISCHARGE PLANNING  Goal: Discharge to home or other facility with appropriate resources  Description: INTERVENTIONS:  - Identify barriers to discharge w/patient and caregiver  -  Arrange for needed discharge resources and transportation as appropriate  - Identify discharge learning needs (meds, wound care, etc.)  - Arrange for interpretive services to assist at discharge as needed  - Refer to Case Management Department for coordinating discharge planning if the patient needs post-hospital services based on physician/advanced practitioner order or complex needs related to functional status, cognitive ability, or social support system  Outcome: Progressing     Problem: Knowledge Deficit  Goal: Patient/family/caregiver demonstrates understanding of disease process, treatment plan, medications, and discharge instructions  Description: Complete learning assessment and assess knowledge base.  Interventions:  - Provide teaching at level of understanding  - Provide teaching via preferred learning methods  Outcome: Progressing     Problem: Activity Intolerance/Impaired Mobility  Goal: Mobility/activity is maintained at optimum level for patient  Description: Interventions:  - Assess and monitor patient  barriers to mobility and need for assistive/adaptive devices.  - Assess patient's emotional response to limitations.  - Collaborate with interdisciplinary team and initiate plans and interventions as ordered.  - Encourage independent activity per ability.  - Maintain proper body alignment.  - Perform active/passive rom as tolerated/ordered.  - Plan activities to conserve energy.  - Turn patient as appropriate  Outcome: Progressing     Problem: Communication Impairment  Goal: Ability to express needs and understand communication  Description: Assess patient's communication skills and ability to understand information.  Patient will demonstrate use of effective communication techniques, alternative methods of communication and understanding even if not able to speak.     - Encourage communication and provide alternate methods of communication as needed.  - Collaborate with case management/social  services for discharge needs.  - Include patient/family/caregiver in decisions related to communication.  Outcome: Progressing     Problem: Potential for Aspiration  Goal: Non-ventilated patient's risk of aspiration is minimized  Description: Assess and monitor vital signs, respiratory status, and labs (WBC).  Monitor for signs of aspiration (tachypnea, cough, rales, wheezing, cyanosis, fever).    - Assess and monitor patient's ability to swallow.  - Place patient up in chair to eat if possible.  - HOB up at 90 degrees to eat if unable to get patient up into chair.  - Supervise patient during oral intake.   - Instruct patient/ family to take small bites.  - Instruct patient/ family to take small single sips when taking liquids.  - Follow patient-specific strategies generated by speech pathologist.  Outcome: Progressing  Goal: Ventilated patient's risk of aspiration is minimized  Description: Assess and monitor vital signs, respiratory status, airway cuff pressure, and labs (WBC).  Monitor for signs of aspiration (tachypnea, cough, rales, wheezing, cyanosis, fever).    - Elevate head of bed 30 degrees if patient has tube feeding.  - Monitor tube feeding.  Outcome: Progressing     Problem: Nutrition  Goal: Nutrition/Hydration status is improving  Description: Monitor and assess patient's nutrition/hydration status for malnutrition (ex- brittle hair, bruises, dry skin, pale skin and conjunctiva, muscle wasting, smooth red tongue, and disorientation). Collaborate with interdisciplinary team and initiate plan and interventions as ordered.  Monitor patient's weight and dietary intake as ordered or per policy. Utilize nutrition screening tool and intervene per policy. Determine patient's food preferences and provide high-protein, high-caloric foods as appropriate.     - Assist patient with eating.  - Allow adequate time for meals.  - Encourage patient to take dietary supplement as ordered.  - Collaborate with clinical  nutritionist.  - Include patient/family/caregiver in decisions related to nutrition.  Outcome: Progressing     Problem: Prexisting or High Potential for Compromised Skin Integrity  Goal: Skin integrity is maintained or improved  Description: INTERVENTIONS:  - Identify patients at risk for skin breakdown  - Assess and monitor skin integrity  - Assess and monitor nutrition and hydration status  - Monitor labs   - Assess for incontinence   - Turn and reposition patient  - Assist with mobility/ambulation  - Relieve pressure over bony prominences  - Avoid friction and shearing  - Provide appropriate hygiene as needed including keeping skin clean and dry  - Evaluate need for skin moisturizer/barrier cream  - Collaborate with interdisciplinary team   - Patient/family teaching  - Consider wound care consult   Outcome: Progressing     Problem: Nutrition/Hydration-ADULT  Goal: Nutrient/Hydration intake appropriate for improving, restoring or maintaining nutritional needs  Description: Monitor and assess patient's nutrition/hydration status for malnutrition. Collaborate with interdisciplinary team and initiate plan and interventions as ordered.  Monitor patient's weight and dietary intake as ordered or per policy. Utilize nutrition screening tool and intervene as necessary. Determine patient's food preferences and provide high-protein, high-caloric foods as appropriate.     INTERVENTIONS:  - Monitor oral intake, urinary output, labs, and treatment plans  - Assess nutrition and hydration status and recommend course of action  - Evaluate amount of meals eaten  - Assist patient with eating if necessary   - Allow adequate time for meals  - Recommend/ encourage appropriate diets, oral nutritional supplements, and vitamin/mineral supplements  - Order, calculate, and assess calorie counts as needed  - Recommend, monitor, and adjust tube feedings and TPN/PPN based on assessed needs  - Assess need for intravenous fluids  - Provide  specific nutrition/hydration education as appropriate  - Include patient/family/caregiver in decisions related to nutrition  Outcome: Progressing

## 2024-03-18 NOTE — ASSESSMENT & PLAN NOTE
- Monitor closely for neurogenic bowel. Follow BMs and adjust bowel regimen to target soft, formed stools q1-2 days, per pt's regular schedule.

## 2024-03-18 NOTE — PLAN OF CARE
Problem: PHYSICAL THERAPY ADULT  Goal: Performs mobility at highest level of function for planned discharge setting.  See evaluation for individualized goals.  Description: Treatment/Interventions: Functional transfer training, LE strengthening/ROM, Endurance training, Therapeutic exercise, Elevations, Bed mobility, Gait training, Equipment eval/education, OT, Spoke to nursing          See flowsheet documentation for full assessment, interventions and recommendations.  Note: Prognosis: Good  Problem List: Decreased strength, Decreased endurance, Impaired balance, Decreased mobility, Decreased coordination, Pain  Assessment: Pt is a 33 y.o. male presenting to [facility] on  3/18/2024 0300 w/ primary complaint of  L sided weakness and gait dysfunction secondary to CVA. CT head wo contract showed a chronic R basal ganglia infarct w/ surrounding low-density concerning for possible ivelisse-infarct ischemia. CTA head/neck showed occlusion of the L PCA w/ some distal reconstitution. Pt  has a past medical history of Hypertension. Pt's PLOF is independent w/ mobility, ADLs, and iADLs. Pt's home set up consists of a one story house w/ a basement (plans to use bed/bath on main level upon discharge). Upon evaluation today, pt presents with the following impairments:weakness, impaired balance, decreased endurance, impaired coordination, gait deviations, pain, decreased activity tolerance, decreased functional mobility tolerance, fall risk, and fatigue . Pt's activity limitations include: bed mobility, transfers, ambulation, and stairs. Pt was able to perform bed mobility w/ min assist x 1 (required increased time). Pt was able to perform transfers w/ mod assist x 1 (HHA, required increased time). Pt was able to ambulate 2 ft to the chair from his bed w/ mod assist x 1 (HHA, required increased time, demonstrated a narrow ITZ and difficulty w/ proper foot placement on LLE). Pt was able to perform marches in front of the  chair and required increased time to perform on SLS on the LLE and demonstrated increased swaying before finding his balance. Pt would benefit from skilled PT 3-5x/week for 8 weeks to address these impairments and activity limitations. Pt will continue to be seen upon admission. Pt's prognosis is Good secondary to:  age, PLOF, limited PMH, and motivation . The patient's -[facility] Basic Mobility Inpatient Standardized Score is less than 42.9, suggesting this patient may benefit from discharge to post-acute rehabilitation services. Please also refer to the recommendation of the Physical Therapist for safe discharge planning.        Rehab Resource Intensity Level, PT: I (Maximum Resource Intensity)    See flowsheet documentation for full assessment.

## 2024-03-18 NOTE — PROGRESS NOTES
Progress Note - Neurology   DEANNA X Leiss 33 y.o. male [id number]  Unit/Bed#: ICU 03/ICU 03    Assessment/Plan:    Stroke-like symptoms  Assessment & Plan  33 y.o. male with uncontrolled HTN (poor medication compliance) and prior heroin abuse now on methadone who initially presented to [facility] on 3/17/2024 as a stroke alert for left-sided weakness/numbness, unsteady gait, 10/10 headache, and transient right-sided vision loss (resolved within minutes).      /119 on arrival.    NIHSS 5 (left facial droop, LUE/LLE drift, LUE/LLE sensory loss, mild/moderate dysarthria).    CT head showed chronic right basal ganglia lacunar infarct.    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    Patient was loaded with aspirin 324 mg once, started on Cardene drip, and transferred to [facility] for possible neurosurgery intervention.    Labs:   Abnormal:  , UDS positive for methadone.  CRP elevated (4.7, 5.9, 5.13), elevated troponin  WNL: A1c 5.4, Flu/RSV, NISHA, C3/C4 complement, ESR, lactic acid, ethanol, TSH  Pending: ANCA, Sjogren's, RF, ACE, thrombosis panel (thus far Antithrombin III activity low at 88%)    The left M2 narrowing does not correlate with his presenting symptoms.  The L PCA occlusion is likely an incidental finding, and does not correlate with current symptoms.  Etiology for left-sided weakness/sensory changes and gait dysfunction concerning for acute stroke; MRI brain pending.      Plan:  - Neurosurgery consulted; appreciate recommendations  - Stroke pathway  MRI brain pending  Echo pending; will likely require VALENTINA  Rheumatologic and thrombosis labs pending as above  S/p aspirin 325 mg once on 3/17  Start Aspirin 81 mg daily (antiplatelet naive)  Start Atorvastatin 40 mg (statin naive)  Permissive HTN, labetalol  if SBP >200; currently on Cardene gtt  Euglycemic, normothermic goal  Continue telemetry  PT/OT/ST  Stroke education  Frequent neuro checks. Continue to monitor and notify neurology with any changes.  STAT CT head for any acute change in neuro exam  - Medical management and supportive care per primary team. Correction of any metabolic or infectious disturbances.   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      Subjective:   Patient seen and examined with attending neurologist.  Patient states that he continues to have left-sided weakness and sensory changes.  He has not had any further visual disturbances.  He has mild dysarthria.  He has had difficulty ambulating.      Past Medical History:   Diagnosis Date    Hypertension      No past surgical history on file.  No family history on file.  Social History     Socioeconomic History    Marital status: /Civil Union     Spouse name: Not on file    Number of children: Not on file    Years of education: Not on file    Highest education level: Not on file   Occupational History    Not on file   Tobacco Use    Smoking status: Every Day     Current packs/day: 0.25     Average packs/day: 0.3 packs/day for 20.0 years (5.0 ttl pk-yrs)     Types: Cigarettes    Smokeless tobacco: Never   Vaping Use    Vaping status: Never Used   Substance and Sexual Activity    Alcohol use: Not Currently    Drug use: Not Currently    Sexual activity: Not on file   Other Topics Concern    Not on file   Social History Narrative    Not on file     Social Determinants of Health     Financial Resource Strain: Low Risk  (7/8/2021)    Received from [facility]    Overall Financial Resource Strain (CARDIA)     Difficulty of Paying Living Expenses: Not hard at all   Food Insecurity: No Food Insecurity (7/8/2021)     Received from [facility]    Hunger Vital Sign     Worried About Running Out of Food in the Last Year: Never true     Ran Out of Food in the Last Year: Never true   Transportation Needs: No Transportation Needs (7/8/2021)    Received from [facility]    PRAPARE - Transportation     Lack of Transportation (Medical): No     Lack of Transportation (Non-Medical): No   Physical Activity: Not on file   Stress: Not on file   Social Connections: Not on file   Intimate Partner Violence: Not on file   Housing Stability: Not on file     E-Cigarette/Vaping    E-Cigarette Use Never User      E-Cigarette/Vaping Substances         Medications:  All current active meds have been reviewed and current meds:  Scheduled Meds:  Current Facility-Administered Medications   Medication Dose Route Frequency Provider Last Rate    aspirin  81 mg Oral Daily Best Corona PA-C      atorvastatin  40 mg Oral QPM Best Corona PA-C      chlorhexidine  15 mL Mouth/Throat Q12H ARYA Best Corona PA-C      enoxaparin  40 mg Subcutaneous Daily Best Corona PA-C      labetalol  10 mg Intravenous Q4H PRN Best Corona PA-C      methadone  105 mg Oral Daily With Breakfast Best Corona PA-C      methadone  110 mg Oral QPM Best Corona PA-C      niCARdipine           niCARdipine  1-15 mg/hr Intravenous Titrated ALEN [place] 10 mg/hr (03/18/24 1004)     Continuous Infusions:niCARdipine, 1-15 mg/hr, Last Rate: 10 mg/hr (03/18/24 1004)      PRN Meds:.  labetalol    niCARdipine       ROS:   Review of Systems   Eyes:  Negative for visual disturbance (resolved).   Neurological:  Positive for speech difficulty, weakness and numbness. Negative for dizziness.   All other systems reviewed and are negative.            Vitals:   BP (!) 187/88   Pulse 102   Temp 99.2 °F (37.3 °C) (Oral)   Resp (!) 24   SpO2 98%     Physical Exam:   Physical Exam  Vitals and nursing note reviewed.    Constitutional:       Appearance: Normal appearance.      Comments: Patient sitting comfortably in bed in no acute distress   HENT:      Head: Normocephalic and atraumatic.      Mouth/Throat:      Mouth: Mucous membranes are moist.      Pharynx: Oropharynx is clear.   Eyes:      Extraocular Movements: Extraocular movements intact.      Conjunctiva/sclera: Conjunctivae normal.      Pupils: Pupils are equal, round, and reactive to light.   Pulmonary:      Effort: Pulmonary effort is normal.   Musculoskeletal:      Comments: Decreased strength in LUE/LLE   Skin:     General: Skin is warm and dry.   Neurological:      Mental Status: He is alert and oriented to person, place, and time.      Comments: See full neuro exam below       Neurologic Exam     Mental Status   Oriented to person, place, and time.   Patient awake and alert.  Oriented to person, place, month, and year.  Mild dysarthria  No aphasia  Able to follow simple midline and appendicular commands.     Cranial Nerves     CN III, IV, VI   Pupils are equal, round, and reactive to light.  EOMs intact without nystagmus.  No visual field deficits.  Facial sensation to light touch intact throughout.  Flattening of L nasolabial fold  Tongue slightly deviates to the right  Hearing grossly intact.     Motor Exam   Muscle bulk: normal  4+/5 strength L deltoid, 4+/5 L biceps/triceps, 4+/5 left  strength.  4+/5 L iliopsoas muscle.  5/5 L knee flexion/extension.  5-/5 L plantarflexion.  4+/5 L dorsiflexion.  5/5 RUE/RLE     Sensory Exam   Sensation to light touch diminished in the LUE/LLE (approximately 80% compared to the right side)     Gait, Coordination, and Reflexes     Gait  Gait: (deferred for patient's safety)  Slight dysmetria with L finger to nose, however not out of proportion to weakness  No ataxia with R finger to nose  No resting or action tremor           Labs: I have personally reviewed pertinent reports.   Recent Results (from the past 24 hour(s))  "  Fingerstick Glucose (POCT)    Collection Time: 03/17/24 12:11 PM   Result Value Ref Range    POC Glucose 116 65 - 140 mg/dl   CBC and Platelet    Collection Time: 03/17/24 12:26 PM   Result Value Ref Range    WBC 7.01 4.31 - 10.16 Thousand/uL    RBC 4.66 3.88 - 5.62 Million/uL    Hemoglobin 12.9 12.0 - 17.0 g/dL    Hematocrit 38.7 36.5 - 49.3 %    MCV 83 82 - 98 fL    MCH 27.7 26.8 - 34.3 pg    MCHC 33.3 31.4 - 37.4 g/dL    RDW 13.1 11.6 - 15.1 %    Platelets 150 149 - 390 Thousands/uL    MPV 10.7 8.9 - 12.7 fL   Protime-INR    Collection Time: 03/17/24 12:26 PM   Result Value Ref Range    Protime 14.6 (H) 11.6 - 14.5 seconds    INR 1.10 0.84 - 1.19   APTT    Collection Time: 03/17/24 12:26 PM   Result Value Ref Range    PTT 21 (L) 23 - 37 seconds   HS Troponin 0hr (reflex protocol)    Collection Time: 03/17/24 12:26 PM   Result Value Ref Range    hs TnI 0hr 13 \"Refer to ACS Flowchart\"- see link ng/L   FLU/RSV/COVID - if FLU/RSV clinically relevant    Collection Time: 03/17/24 12:26 PM    Specimen: Nasopharyngeal Swab; Nares   Result Value Ref Range    SARS-CoV-2 Negative Negative    INFLUENZA A PCR Negative Negative    INFLUENZA B PCR Negative Negative    RSV PCR Negative Negative   Comprehensive metabolic panel    Collection Time: 03/17/24 12:26 PM   Result Value Ref Range    Sodium 134 (L) 135 - 147 mmol/L    Potassium 3.7 3.5 - 5.3 mmol/L    Chloride 101 96 - 108 mmol/L    CO2 28 21 - 32 mmol/L    ANION GAP 5 4 - 13 mmol/L    BUN 17 5 - 25 mg/dL    Creatinine 1.08 0.60 - 1.30 mg/dL    Glucose 120 65 - 140 mg/dL    Calcium 9.0 8.4 - 10.2 mg/dL    AST 13 13 - 39 U/L    ALT 15 7 - 52 U/L    Alkaline Phosphatase 54 34 - 104 U/L    Total Protein 5.8 (L) 6.4 - 8.4 g/dL    Albumin 3.7 3.5 - 5.0 g/dL    Total Bilirubin 0.55 0.20 - 1.00 mg/dL    eGFR 88 ml/min/1.73sq m   ECG 12 lead    Collection Time: 03/17/24 12:26 PM   Result Value Ref Range    Ventricular Rate 83 BPM    Atrial Rate 83 BPM    NH Interval 150 ms    " "QRSD Interval 108 ms    QT Interval 450 ms    QTC Interval 528 ms    P Axis 58 degrees    QRS Axis 3 degrees    T Wave Axis 50 degrees   Rapid drug screen, urine    Collection Time: 03/17/24  1:37 PM   Result Value Ref Range    Amph/Meth UR Negative Negative    Barbiturate Ur Negative Negative    Benzodiazepine Urine Negative Negative    Cocaine Urine Negative Negative    Methadone Urine Positive (A) Negative    Opiate Urine Negative Negative    PCP Ur Negative Negative    THC Urine Negative Negative    Oxycodone Urine Negative Negative   NISHA Screen w/ Reflex to Titer/Pattern    Collection Time: 03/17/24  2:35 PM   Result Value Ref Range    NISHA Negative Negative   C3 complement    Collection Time: 03/17/24  2:35 PM   Result Value Ref Range    C3 Complement 140 87 - 200 mg/dL   C4 complement    Collection Time: 03/17/24  2:35 PM   Result Value Ref Range    C4, COMPLEMENT 34 19 - 52 mg/dL   RF Screen w/ Reflex to Titer    Collection Time: 03/17/24  2:35 PM   Result Value Ref Range    Rheumatoid Factor Negative Negative   Sedimentation rate, automated    Collection Time: 03/17/24  2:35 PM   Result Value Ref Range    Sed Rate 7 0 - 14 mm/hour   C-reactive protein    Collection Time: 03/17/24  2:35 PM   Result Value Ref Range    CRP 4.7 (H) <3.0 mg/L   Antithrombin III Activity    Collection Time: 03/17/24  2:35 PM   Result Value Ref Range    AntiThrombIN III Activity 98 92 - 136 % of Normal   Protein C activity    Collection Time: 03/17/24  2:35 PM   Result Value Ref Range    Protein C Activity 96.0 60 - 150 % of Normal   B-Type Natriuretic Peptide(BNP)    Collection Time: 03/17/24  2:35 PM   Result Value Ref Range    BNP 98 0 - 100 pg/mL   HS Troponin I 2hr    Collection Time: 03/17/24  3:09 PM   Result Value Ref Range    hs TnI 2hr 29 \"Refer to ACS Flowchart\"- see link ng/L    Delta 2hr hsTnI 16 <20 ng/L   Rapid drug screen, urine    Collection Time: 03/17/24  3:57 PM   Result Value Ref Range    Amph/Meth UR Negative " "Negative    Barbiturate Ur Negative Negative    Benzodiazepine Urine Negative Negative    Cocaine Urine Negative Negative    Methadone Urine Positive (A) Negative    Opiate Urine Negative Negative    PCP Ur Negative Negative    THC Urine Negative Negative    Oxycodone Urine Negative Negative   C-reactive protein    Collection Time: 03/17/24  3:57 PM   Result Value Ref Range    CRP 5.9 (H) <3.0 mg/L   HS Troponin 0hr (reflex protocol)    Collection Time: 03/17/24  3:57 PM   Result Value Ref Range    hs TnI 0hr 74 (H) \"Refer to ACS Flowchart\"- see link ng/L   UA (URINE) with reflex to Scope    Collection Time: 03/17/24  3:57 PM   Result Value Ref Range    Color, UA Yellow     Clarity, UA Clear     Specific Gravity, UA 1.010 1.003 - 1.030    pH, UA 8.5 (A) 4.5, 5.0, 5.5, 6.0, 6.5, 7.0, 7.5, 8.0    Leukocytes, UA Negative Negative    Nitrite, UA Negative Negative    Protein, UA Negative Negative mg/dl    Glucose, UA Negative Negative mg/dl    Ketones, UA Negative Negative mg/dl    Urobilinogen, UA 0.2 0.2, 1.0 E.U./dl E.U./dl    Bilirubin, UA Negative Negative    Occult Blood, UA Negative Negative   CK    Collection Time: 03/17/24  3:57 PM   Result Value Ref Range    Total CK 69 39 - 308 U/L   Lactic acid, plasma (w/reflex if result > 2.0)    Collection Time: 03/17/24  3:57 PM   Result Value Ref Range    LACTIC ACID 1.3 0.5 - 2.0 mmol/L   Ethanol    Collection Time: 03/17/24  3:57 PM   Result Value Ref Range    Ethanol Lvl <10 <10 mg/dL   High sensitivity CRP    Collection Time: 03/17/24  3:57 PM   Result Value Ref Range    CRP, High Sensitivity 5.13 (H) <3.00 mg/L   Homocysteine    Collection Time: 03/17/24  3:57 PM   Result Value Ref Range    Homocyst(e)ine, P/S 10.9 5.0 - 15.0 umol/L   TSH, 3rd generation with Free T4 reflex    Collection Time: 03/17/24  3:57 PM   Result Value Ref Range    TSH 3RD GENERATON 0.831 0.450 - 4.500 uIU/mL   HS Troponin I 4hr    Collection Time: 03/17/24  4:49 PM   Result Value Ref Range " "   hs TnI 4hr 155 (H) \"Refer to ACS Flowchart\"- see link ng/L    Delta 4hr hsTnI 81 (H) <20 ng/L   HS Troponin I 2hr    Collection Time: 03/17/24  6:15 PM   Result Value Ref Range    hs TnI 2hr 301 (H) \"Refer to ACS Flowchart\"- see link ng/L    Delta 2hr hsTnI 227 (H) <20 ng/L   HS Troponin I 4hr    Collection Time: 03/17/24  8:35 PM   Result Value Ref Range    hs TnI 4hr 349 (H) \"Refer to ACS Flowchart\"- see link ng/L    Delta 4hr hsTnI 275 (H) <20 ng/L   Calcium, ionized    Collection Time: 03/18/24  5:01 AM   Result Value Ref Range    Calcium, Ionized 1.16 1.12 - 1.32 mmol/L   CBC and differential    Collection Time: 03/18/24  5:01 AM   Result Value Ref Range    WBC 7.09 4.31 - 10.16 Thousand/uL    RBC 5.01 3.88 - 5.62 Million/uL    Hemoglobin 13.8 12.0 - 17.0 g/dL    Hematocrit 41.6 36.5 - 49.3 %    MCV 83 82 - 98 fL    MCH 27.5 26.8 - 34.3 pg    MCHC 33.2 31.4 - 37.4 g/dL    RDW 13.4 11.6 - 15.1 %    MPV 11.2 8.9 - 12.7 fL    Platelets 171 149 - 390 Thousands/uL    nRBC 0 /100 WBCs    Neutrophils Relative 76 (H) 43 - 75 %    Immature Grans % 0 0 - 2 %    Lymphocytes Relative 18 14 - 44 %    Monocytes Relative 6 4 - 12 %    Eosinophils Relative 0 0 - 6 %    Basophils Relative 0 0 - 1 %    Neutrophils Absolute 5.35 1.85 - 7.62 Thousands/µL    Absolute Immature Grans 0.03 0.00 - 0.20 Thousand/uL    Absolute Lymphocytes 1.27 0.60 - 4.47 Thousands/µL    Absolute Monocytes 0.43 0.17 - 1.22 Thousand/µL    Eosinophils Absolute 0.00 0.00 - 0.61 Thousand/µL    Basophils Absolute 0.01 0.00 - 0.10 Thousands/µL   Comprehensive metabolic panel    Collection Time: 03/18/24  5:01 AM   Result Value Ref Range    Sodium 137 135 - 147 mmol/L    Potassium 3.5 3.5 - 5.3 mmol/L    Chloride 100 96 - 108 mmol/L    CO2 25 21 - 32 mmol/L    ANION GAP 12 4 - 13 mmol/L    BUN 18 5 - 25 mg/dL    Creatinine 1.07 0.60 - 1.30 mg/dL    Glucose 112 65 - 140 mg/dL    Calcium 9.2 8.4 - 10.2 mg/dL    AST 17 13 - 39 U/L    ALT 17 7 - 52 U/L    " Alkaline Phosphatase 66 34 - 104 U/L    Total Protein 6.6 6.4 - 8.4 g/dL    Albumin 4.2 3.5 - 5.0 g/dL    Total Bilirubin 0.60 0.20 - 1.00 mg/dL    eGFR 89 ml/min/1.73sq m   Magnesium    Collection Time: 03/18/24  5:01 AM   Result Value Ref Range    Magnesium 2.1 1.9 - 2.7 mg/dL   Phosphorus    Collection Time: 03/18/24  5:01 AM   Result Value Ref Range    Phosphorus 3.7 2.7 - 4.5 mg/dL   Hemoglobin A1c w/EAG Estimation    Collection Time: 03/18/24  5:01 AM   Result Value Ref Range    Hemoglobin A1C 5.4 Normal 4.0-5.6%; PreDiabetic 5.7-6.4%; Diabetic >=6.5%; Glycemic control for adults with diabetes <7.0% %     mg/dl   Lipid Panel with Direct LDL reflex    Collection Time: 03/18/24  5:01 AM   Result Value Ref Range    Cholesterol 159 See Comment mg/dL    Triglycerides 33 See Comment mg/dL    HDL, Direct 42 >=40 mg/dL    LDL Calculated 110 (H) 0 - 100 mg/dL   Antithrombin III Activity    Collection Time: 03/18/24  5:01 AM   Result Value Ref Range    AntiThrombIN III Activity 88 (L) 92 - 136 % of Normal   Protein C activity    Collection Time: 03/18/24  5:01 AM   Result Value Ref Range    Protein C Activity 96.0 60 - 150 % of Normal   ECG 12 lead    Collection Time: 03/18/24  5:01 AM   Result Value Ref Range    Ventricular Rate 105 BPM    Atrial Rate 105 BPM    CT Interval 150 ms    QRSD Interval 108 ms    QT Interval 408 ms    QTC Interval 540 ms    P Flushing 77 degrees    QRS Axis 43 degrees    T Wave Axis 52 degrees       Imaging: I have personally reviewed pertinent imaging in PACS, including CT head, CTA head/neck,  and I have personally reviewed PACS reports.     EKG, Pathology, and Other Studies: I have personally reviewed pertinent reports.       VTE Prophylaxis: Sequential compression device (Venodyne)  and Enoxaparin (Lovenox)

## 2024-03-18 NOTE — UTILIZATION REVIEW
NOTIFICATION OF INPATIENT ADMISSION   AUTHORIZATION REQUEST   SERVICING FACILITY:   [place] [address]  Tax ID: [id number]  NPI: [id number] ATTENDING PROVIDER:  Attending Name and NPI#: Ayleen Cornell Md [[id number]]  Address: [address] [place] [address]  Phone: [phone]   ADMISSION INFORMATION:  Place of Service: Inpatient [facility]  Place of Service Code: 21  Inpatient Admission Date/Time: 3/17/24  1:23 PM  Discharge Date/Time: 3/18/2024  2:35 AM  Admitting Diagnosis Code/Description:  Stroke (HCC) [I63.9]  Acute left-sided weakness [R53.1]  Hypertensive emergency [I16.1]  Stroke-like symptoms [R29.90]     UTILIZATION REVIEW CONTACT:  Sanjana Chung, Utilization   Network Utilization Review Department  Phone: [phone]  Fax [phone]  Email: [email or web address]  Contact for approvals/pending authorizations, clinical reviews, and discharge.     PHYSICIAN ADVISORY SERVICES:  Medical Necessity Denial & Cnvf-eh-Vfbe Review  Phone: [phone]  Fax: [phone]  Email: [email or web address]     DISCHARGE SUPPORT TEAM:  For Patients Discharge Needs & Updates  Phone: [phone] opt. 2 Fax: [phone]  Email: [email or web address]

## 2024-03-18 NOTE — PHYSICAL THERAPY NOTE
"   PT Evaluation       03/18/24 1128   PT Last Visit   PT Visit Date 03/18/24   Note Type   Note type Evaluation   Pain Assessment   Pain Assessment Tool 0-10   Pain Score No Pain   Restrictions/Precautions   Weight Bearing Precautions Per Order No   Braces or Orthoses   (none)   Other Precautions Chair Alarm;Bed Alarm;Multiple lines;Telemetry;Fall Risk   Home Living   Type of Home House   Home Layout One level;Able to live on main level with bedroom/bathroom;Other (Comment)  (1 nuvia, basement is typically bed/bath used, pt reports he will be staying in the first floor upon discharge)   Bathroom Shower/Tub Tub/shower unit   Bathroom Toilet Standard   Bathroom Equipment Grab bars in shower;Grab bars around toilet   Home Equipment   (no DME PTA)   Prior Function   Level of [place] Independent with ADLs;Independent with functional mobility;Independent with IADLS   Lives With Family;Spouse   Receives Help From Family   IADLs Independent with driving;Independent with meal prep;Independent with medication management   Falls in the last 6 months 0   Vocational   (pt reports he does not work)   Comments pt reports he usually provides some care for his mother   General   Family/Caregiver Present No   Cognition   Overall Cognitive Status WFL   Arousal/Participation Cooperative   Orientation Level Oriented X4   Memory Within functional limits   Following Commands Follows one step commands without difficulty   Subjective   Subjective \"It feels like I am dragging my L leg.\"   RUE Assessment   RUE Assessment   (see OT eval)   LUE Assessment   LUE Assessment   (see OT eval)   RLE Assessment   RLE Assessment WNL   LLE Assessment   LLE Assessment X   Strength LLE   L Hip Flexion 3+/5   L Knee Extension 3+/5   L Ankle Dorsiflexion 4/5   Coordination   Movements are Fluid and Coordinated 0   Coordination and Movement Description impaired dysdisdochokinesia and dysmetria on the LLE   Bed Mobility   Supine to Sit 4  Minimal " assistance   Additional items Assist x 1;HOB elevated   Additional Comments pt received in bed and left in recliner w/ chair alarm at end of eval   Transfers   Sit to Stand 3  Moderate assistance   Additional items Assist x 1;Increased time required   Stand to Sit 3  Moderate assistance   Additional items Assist x 1;Increased time required   Additional Comments w/ HHA   Ambulation/Elevation   Gait pattern Ataxia;Narrow ITZ;Forward Flexion;Improper Weight shift   Gait Assistance 3  Moderate assist   Additional items Assist x 1   Assistive Device None   Distance 2 ft from bed to chair   Ambulation/Elevation Additional Comments w/ HHA, pt performed marches in front of recliner after ambulation, demonstrated difficulty performing SLS on LLE w/ increased swaying and required increased time to perform   Balance   Static Sitting Fair   Dynamic Sitting Fair   Static Standing Poor +   Dynamic Standing Poor +   Ambulatory Poor   Endurance Deficit   Endurance Deficit Yes   Endurance Deficit Description limited due to LLE weakness and discoordination   Activity Tolerance   Activity Tolerance Other (Comment);Patient limited by fatigue  (LLE weakness and discoordination)   Medical Staff Made Aware OT   Nurse Made Aware yes   Assessment   Prognosis Good   Problem List Decreased strength;Decreased endurance;Impaired balance;Decreased mobility;Decreased coordination;Pain   Assessment Pt is a 33 y.o. male presenting to [facility] on  3/18/2024 0300 w/ primary complaint of  L sided weakness and gait dysfunction secondary to CVA. CT head wo contract showed a chronic R basal ganglia infarct w/ surrounding low-density concerning for possible ivelisse-infarct ischemia. CTA head/neck showed occlusion of the L PCA w/ some distal reconstitution. Pt  has a past medical history of Hypertension. Pt's PLOF is independent w/ mobility, ADLs, and iADLs. Pt's home set up consists of a one story house w/ a basement (plans to use bed/bath on main  level upon discharge). Upon evaluation today, pt presents with the following impairments:weakness, impaired balance, decreased endurance, impaired coordination, gait deviations, pain, decreased activity tolerance, decreased functional mobility tolerance, fall risk, and fatigue . Pt's activity limitations include: bed mobility, transfers, ambulation, and stairs. Pt was able to perform bed mobility w/ min assist x 1 (required increased time). Pt was able to perform transfers w/ mod assist x 1 (HHA, required increased time). Pt was able to ambulate 2 ft to the chair from his bed w/ mod assist x 1 (HHA, required increased time, demonstrated a narrow ITZ and difficulty w/ proper foot placement on LLE). Pt was able to perform marches in front of the chair and required increased time to perform on SLS on the LLE and demonstrated increased swaying before finding his balance. Pt would benefit from skilled PT 3-5x/week for 8 weeks to address these impairments and activity limitations. Pt will continue to be seen upon admission. Pt's prognosis is Good secondary to:  age, PLOF, limited PMH, and motivation . The patient's -[facility] Basic Mobility Inpatient Standardized Score is less than 42.9, suggesting this patient may benefit from discharge to post-acute rehabilitation services. Please also refer to the recommendation of the Physical Therapist for safe discharge planning.   Goals   Patient Goals to get better   LTG Expiration Date 04/01/24   Long Term Goal #1 In 14 days, pt will: 1) Perform bed mobility independently to participate in frequent repositioning and improve skin integrity; 2) Perform functional transfers independently to promote I with toileting and OOB mobility; 3) Ambulate 200 feet mod-I with least restrictive device to participate in household and community level mobility; 4) Improve b/l LE strength by 1/2 grade in order to improve efficiency of tranfers; 5) Improve balance by 1 grade to reduce risk for falls; 6)  Improve overall activity tolerance to 60 minutes in order to increase patient's ability to engage in mobility tasks; 7) Navigate 1 step S level in order to safely navigate nuvia at home   Plan   Treatment/Interventions Functional transfer training;LE strengthening/ROM;Endurance training;Therapeutic exercise;Elevations;Bed mobility;Gait training;Equipment eval/education;OT;Spoke to nursing   PT Frequency 3-5x/wk   Discharge Recommendation   Rehab Resource Intensity Level, PT I (Maximum Resource Intensity)   AM-PAC Basic Mobility Inpatient   Turning in Flat Bed Without Bedrails 3   Lying on Back to Sitting on Edge of Flat Bed Without Bedrails 3   Moving Bed to Chair 2   Standing Up From Chair Using Arms 2   Walk in Room 2   Climb 3-5 Stairs With Railing 2   Basic Mobility Inpatient Raw Score 14   Basic Mobility Standardized Score 35.55   [facility] Highest Level Of Mobility   -HLM Goal 4: Move to chair/commode   -HLM Achieved 4: Move to chair/commode     Chelo Spence, SPT

## 2024-03-18 NOTE — EMTALA/ACUTE CARE TRANSFER
[facility] INTENSIVE CARE UNIT  [address] [address]  Dept: [phone]      ACUTE CARE TRANSFER CONSENT    NAME DEANNA Ashraf                                         1990                              MRN [id number]    I have been informed of my rights regarding examination, treatment, and transfer   by Dr. Ayleen Cornell MD    Benefits: Specialized equipment and/or services available at the receiving facility (Include comment)________________________, Continuity of care    Risks: Potential for delay in receiving treatment, Potential deterioration of medical condition, Possible worsening of condition or death during transfer      Transfer Request:  I acknowledge that my medical condition has been evaluated and explained to me by the treating physician or other qualified medical person and/or my attending physician who has recommended and offered to me further medical examination and treatment. I understand the Hospital's obligation with respect to the treatment and stabilization of my medical condition. I nevertheless request to be transferred. I release the Hospital, the doctor, and any other persons caring for me from all responsibility or liability for any injury or ill effects that may result from my transfer and agree to accept all responsibility for the consequences of my choice to transfer, rather than receive stabilizing treatment at the Hospital. I understand that because the transfer is my request, my insurance may not provide reimbursement for the services.  The Hospital will assist and direct me and my family in how to make arrangements for transfer, but the hospital is not liable for any fees charged by the transport service.  In spite of this understanding, I refuse to consent to further medical examination and treatment which has been offered to me, and request transfer to Accepting Facility Name, City & State : [facility]. I authorize the  performance of emergency medical procedures and treatments upon me in both transit and upon arrival at the receiving facility.  Additionally, I authorize the release of any and all medical records to the receiving facility and request they be transported with me, if possible.    I authorize the performance of emergency medical procedures and treatments upon me in both transit and upon arrival at the receiving facility.  Additionally, I authorize the release of any and all medical records to the receiving facility and request they be transported with me, if possible.  I understand that the safest mode of transportation during a medical emergency is an ambulance and that the Hospital advocates the use of this mode of transport. Risks of traveling to the receiving facility by car, including absence of medical control, life sustaining equipment, such as oxygen, and medical personnel has been explained to me and I fully understand them.    (GAVINO CORRECT BOX BELOW)  [  ]  I consent to the stated transfer and to be transported by ambulance/helicopter.  [  ]  I consent to the stated transfer, but refuse transportation by ambulance and accept full responsibility for my transportation by car.  I understand the risks of non-ambulance transfers and I exonerate the Hospital and its staff from any deterioration in my condition that results from this refusal.    X___________________________________________    DATE  24  TIME________  Signature of patient or legally responsible individual signing on patient behalf           RELATIONSHIP TO PATIENT_________________________          Provider Certification    NAME DEANNA Ashraf                                         1990                              MRN [id number]    A medical screening exam was performed on the above named patient.  Based on the examination:    Condition Necessitating Transfer Critical     Patient Condition: The patient has been stabilized such that  within reasonable medical probability, no material deterioration of the patient condition or the condition of the unborn child(neville) is likely to result from the transfer    Reason for Transfer: Level of Care needed not available at this facility    Transfer Requirements: Facility [facility]   Space available and qualified personnel available for treatment as acknowledged by    Agreed to accept transfer and to provide appropriate medical treatment as acknowledged by       Dr Gonzalez  Appropriate medical records of the examination and treatment of the patient are provided at the time of transfer   STAFF INITIAL WHEN COMPLETED _______  Transfer will be performed by qualified personnel from    and appropriate transfer equipment as required, including the use of necessary and appropriate life support measures.    Provider Certification: I have examined the patient and explained the following risks and benefits of being transferred/refusing transfer to the patient/family:  General risk, such as traffic hazards, adverse weather conditions, rough terrain or turbulence, possible failure of equipment (including vehicle or aircraft), or consequences of actions of persons outside the control of the transport personnel, Risk of worsening condition      Based on these reasonable risks and benefits to the patient and/or the unborn child(neville), and based upon the information available at the time of the patient’s examination, I certify that the medical benefits reasonably to be expected from the provision of appropriate medical treatments at another medical facility outweigh the increasing risks, if any, to the individual’s medical condition, and in the case of labor to the unborn child, from effecting the transfer.    X____________________________________________ DATE 03/18/24        TIME_______      ORIGINAL - SEND TO MEDICAL RECORDS   COPY - SEND WITH PATIENT DURING TRANSFER

## 2024-03-18 NOTE — ASSESSMENT & PLAN NOTE
- Given Mr. Ashraf' current level of medical complexity, available support system of family members and caregivers at home, expected tolerance for 3 hours of therapies 5 days per week, expected current level of function significantly below baseline based on presenting impairments and expected functional prognosis, expect that he will be an ideal candidate for inpatient rehabilitation (IRF-level care). His PT and OT assessments are pending at which time we will have a better sense of his current functional ability; he may be able to progress to a point where he can discharge directly home, however expect at this point that he would likely benefit from post-acute rehabilitation at the IRF level.

## 2024-03-18 NOTE — SPEECH THERAPY NOTE
Speech-Language Pathology Bedside Swallow Evaluation    Patient Name: DEANNA Ashraf    Today's Date: 3/18/2024     Problem List  Principal Problem:    Cerebrovascular accident (CVA) due to occlusion of left posterior cerebral artery (HCC)  Active Problems:    Left-sided weakness    Aneurysm of anterior communicating artery    Methadone use    Hypertensive emergency    Past Medical History  Past Medical History:   Diagnosis Date    Hypertension      Past Surgical History  No past surgical history on file.    Summary  Pt presented with s/s suggestive of minimal oropharyngeal dysphagia. Symptoms or concerns included  decreased but functional mastication d/t lack of dentition, as well as min reduced hyolaryngeal rise.  Mild delayed cough noted x1 during assessment. Vocal quality was clear across all trials. Pt reported no hx of difficulty swallowing. Will f/u briefly to ensure consistent PO tolerance in the presence of CVA.    Risk/s for Aspiration: mild    Recommended Diet: regular diet and thin liquids   Recommended Form of Meds: whole with liquid   Aspiration precautions and swallowing strategies: upright posture  Other Recommendations: Continue frequent oral care      Current Medical Status  DEANNA Ashraf is a 32 yo male with a PMHx HTN (med noncompliance) and substance abuse disorder on Methadone, who presented as a transfer from [facility] on 3/18 due to acute stroke. Pt presented with c/o weakness and decreased sensation of the left arm and leg. Found to have left-sided weakness, facial droop, with hypertensive urgency with SBP 250s/120s. 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 Pt placed on cardene infusion and loaded with 325mg ASA. Neuro consulted with recs to transfer to [facility] for possible Nsgy intervention.     Special Studies:  CTA chest 3/18/24 LUNGS: Lungs are clear. No tracheal or endobronchial lesion.   CTA head 3/17/24 IMPRESSION:  Mild smooth stenosis of  the right cavernous internal carotid artery.  There appears to be occlusion of the left posterior cerebral artery proximally with some distal reconstitution.  At least one of the anterior M2 branches on the left demonstrates moderate atherosclerotic narrowing.  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 Recommend consultation with the [facility], a division of [facility] at [phone].        Social/Education/Vocational Hx:  Pt lives with family    Swallow Information   Current Risks for Dysphagia & Aspiration: CVA  Current Diet: NPO   Baseline Diet: regular diet and thin liquids      Baseline Assessment   Behavior/Cognition: alert  Speech/Language Status: able to participate in conversation and able to follow commands  Patient Positioning: upright in bed  Pain Status/Interventions/Response to Interventions: No report of or nonverbal indications of pain.       Swallow Mechanism Exam  Facial: left facial droop  Labial: decreased ROM left side  Lingual: WFL  Velum: symmetrical  Mandible: adequate ROM  Dentition: edentulous  Vocal quality:dysphonic (mild, pt reported this is normal in the morning)  Volitional Cough:  adequate    Respiratory Status: on RA       Consistencies Assessed and Performance   Consistencies Administered: thin liquids and hard solids      Oral Stage: minimal and decreased but functional mastication (edentulous). Bolus formation and transfer were functional with no significant oral residue noted. No overt s/s reduced oral control.    Pharyngeal Stage: minimal and suspected decreased hyolaryngeal elevation upon palpation. Delayed cough x1 may be unrelated to swallowing.    Esophageal Concerns: none reported      Summary and Recommendations (see above)    Results Reviewed with: patient, RN, and MD     Treatment Recommended: likely brief x1 f/u     Dysphagia LTG  -Patient will demonstrate safe and effective  oral intake (without overt s/s significant oral/pharyngeal dysphagia including s/s penetration or aspiration) for the highest appropriate diet level.     Short Term Goals:  -Pt will tolerate regular diet and thin liquid with no significant s/s oral or pharyngeal dysphagia across 1-3 diagnostic sessions.

## 2024-03-18 NOTE — ASSESSMENT & PLAN NOTE
33 y.o. male with uncontrolled HTN (poor medication compliance) and prior heroin abuse now on methadone who initially presented to [facility] on 3/17/2024 as a stroke alert for left-sided weakness/numbness, unsteady gait, 10/10 headache, and transient right-sided vision loss (resolved within minutes).      -/119 on arrival.    -NIHSS 5 (left facial droop, LUE/LLE drift, LUE/LLE sensory loss, mild/moderate dysarthria).    -CT head showed chronic right basal ganglia lacunar infarct.    -CTA head/neck revealed occluded left PCA proximally with some distal reconstitution, moderate atherosclerotic narrowing of the anterior left M2 branch, and 2 mm aneurysm of the anterior communicating artery at the junction of the left A1/A2 segments.    -Patient was loaded with aspirin 324 mg once, started on Cardene drip, and transferred to [facility] for possible neurosurgery intervention.  -MRI brain: Acute infarct in right posterior caudate extending into right posterior limb of internal capsule.  No vessel wall thickening or vessel wall enhancement to suggest active vasculitis.   Chronic infarct in posterior putamen extending into right external capsule with hemosiderin deposition, likely sequelae of hemorrhagic infarct.   Chronic lacunar infarcts in bilateral roberto.   Multiple chronic microhemorrhages in bilateral roberto and to a lesser extent in right caudate, left putamen, and bilateral cerebellum. Findings suggestive of hypertensive arteriopathy.  -MRA head: Probable chronic occlusion of proximal left PCA with faint flow-related signal more distally possibly due to collateral vessels. Moderate-to-severe short segment narrowing in bilateral MCA M2 branch vessels. Findings raise possibility of vasculitis   over atherosclerotic disease given patient's age. Consider cerebral angiography for further evaluation.  Unchanged 0.2 cm anterior communicating artery aneurysm.  -2D echo with EF 65%, dilated LA, normal RA, no MAC.    Labs:   Abnormal:   , UDS positive for methadone.  CRP elevated (4.7, 5.9, 5.13), elevated troponin  WNL: A1c 5.4, Flu/RSV, NISHA, C3/C4 complement, ESR, lactic acid, ethanol, TSH, ANCA, Sjogren's, RF, ACE  Thrombosis panel with low Protein S at 61 (but with questionable significance in acute setting).  D-dimer 0.79    The left M2 narrowing does not correlate with his presenting symptoms.  The L PCA occlusion is likely an incidental finding, and does not correlate with current symptoms.    Acute stroke noted in right posterior caudate may be small vessel in etiology but cannot entirely exclude embolic source.    Plan:  - Neurosurgery consulted; appreciate recommendations  - Stroke pathway  [place] pending. If unrevealing, recommend Zio patch/ILR.  S/p aspirin 325 mg x1 on 3/17  Continue aspirin 81 mg daily (antiplatelet naive). Plavix 75mg added on 3/20. Continue DAPT x21 days, then d/c Plavix ~4/10/24 and remain on ASA monotherapy.  Continue atorvastatin 40 mg   Goal normotension  Repeat thrombosis panel in 6 weeks  Continue telemetry  PT/OT/ST  Stroke education  Frequent neuro checks. Continue to monitor and notify neurology with any changes.  STAT CT head for any acute change in neuro exam  - Medical management and supportive care per primary team. Correction of any metabolic or infectious disturbances.

## 2024-03-18 NOTE — ASSESSMENT & PLAN NOTE
Presents with stroke like symptoms and SBP of 254  In the past was on lisinopril 40 mg and amlodipine 10 mg PO but has not been on for several years  Likely other acute factors contributing:  CVA with left posterior cerebral artery occlusion  Not obtaining his normal methadone dose this AM  He states he takes it sometimes and SBP around 200 regularly    Plan:  Given the fact that he may have uncontrolled HTN as an outpatient will target -200 for now  Currently on cardene on and off - highest /118   Obtain dosing regimen from methadone clinic per pt 105 mg in the am and 110 in the PM

## 2024-03-18 NOTE — H&P
[facility]  H&P: Critical Care  Name: DEANNA Ashraf 33 y.o. male I MRN: [id number]  Unit/Bed#: ICU 03 I Date of Admission: 3/18/2024   Date of Service: 3/18/2024 I Hospital Day: 0    History of Present Illness     HPI: DEANNA Ashraf is a 34 yo male with a PMHx HTN (med noncompliance) and substance abuse disorder on Methadone, who presented as a transfer from [facility] on 3/18 due to acute stroke. Pt presented with c/o weakness and decreased sensation of the left arm and leg. Found to have left-sided weakness, facial droop, with hypertensive urgency with SBP 250s/120s. 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 Pt placed on cardene infusion and loaded with 325mg ASA. Neuro consulted with recs to transfer to hospitals for possible Nsgy intervention.    History obtained from chart review and the patient.    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   UDS: +methadone  Autoimmune/inflammatory workup thus far: CRP 5.13 (elevated).  WDL: lactic, homocystine, sed rate, TSH, C3, C4.   COVID/Flu/RSV: negative  Plan:   Cardene infusion to maintain strict SBP control: 160-180mmg; consider liberalizing if CTA dissection protocol imaging negative  Q1 neuro exams  Neurosurgery consulted - recs appreciated  Neurology consulted - recs appreciated  Pending autoimmune/inflammatory labs: sjogrens ab, thrombosis panel, ACE, C50, RF, ANCA, NISHA  Migraine cocktail as needed (AVOID using NSAIDS, Triptans, steroids and DHE)   Continue ASA 81mg/statin   TTE - pending  HgbA1c, lipid panel - pending  BG control: 140-180    Diagnosis: Substance use disorder  Home medication: methadone.   Methadone clinic: [facility] in [place], [place]  Pt received evening dose of 110mg while at [facility]  Pt denies illicit drug use.  "UDS on 3/17 +methadone only.  Plan:  Confirm methadone dose/route/frequency prior to AM dose  Monitor for s/s of withdrawal    CV:   Diagnosis: Hypertensive urgency   Pt with known hx of HTN but denies active treatment with medication  3/17 presented to [facility] with BP 250s/118s lactic 1.7, sCr 1.08  Plan:   Cardene infusion to maintain strict SBP control: 160-180mmg; consider liberalizing if CTA dissection protocol imaging negative  Frequent BP monitoring; consider a-line insertion  Monitor for organ dysfunction  Renal ultrasound ordered - pending  Continuous telemetry  CTA dissection protocol - pending    Pulm:  No active issues  - Pulmonary toilet, IS, encourage deep breathing  - Continuous pulse ox    GI:   No active issues    :   No active issues    F/E/N:    F: //  E: Monitor and replete to maintain K >4, Phos >3, Mag >2  N: maintain NPO pending Nsgy eval     Heme/Onc:   No active issues  - daily CBC    Endo:   No active issues    ID:   No active issues  - Monitor WBC trend/fever curve  - Monitor for s/s infection    MSK/Skin:   Diagnosis: Left side weakness  LUE/LLE weakness 4/5  Plan:   PT/OT when appropriate  Monitor for skin breakdown  Reposition frequently while in bed    Disposition: Critical care    Review of Systems   Constitutional: Negative.    HENT: Negative.     Eyes: Negative.    Respiratory: Negative.  Negative for cough, choking, chest tightness, shortness of breath and wheezing.    Cardiovascular:  Negative for chest pain, palpitations and leg swelling.   Gastrointestinal:  Negative for abdominal distention, abdominal pain, constipation, diarrhea, nausea and vomiting.   Endocrine: Negative.    Genitourinary: Negative.    Musculoskeletal: Negative.    Skin: Negative.    Neurological:  Positive for weakness (\"my left arm and left leg\") and headaches. Negative for dizziness, tremors, seizures, syncope, facial asymmetry, speech difficulty, light-headedness and numbness.   Psychiatric/Behavioral: " Negative.        Historical Information   Past Medical History:  No date: Hypertension No past surgical history on file.   Current Outpatient Medications   Medication Instructions    methadone (DOLOPHINE) 110 mg, Oral, Every 6 hours PRN, 105mg in the morning<BR>110mg in the evening    Allergies   Allergen Reactions    Haloperidol Hives and Other (See Comments)     Other reaction(s): Extrapyramidal Symptoms      Social History     Tobacco Use    Smoking status: Every Day     Current packs/day: 0.25     Average packs/day: 0.3 packs/day for 20.0 years (5.0 ttl pk-yrs)     Types: Cigarettes    Smokeless tobacco: Never   Vaping Use    Vaping status: Never Used   Substance Use Topics    Alcohol use: Not Currently    Drug use: Not Currently    No family history on file.       Objective                            Vitals I/O      Most Recent Min/Max in 24hrs   Temp   Temp  Min: 97.7 °F (36.5 °C)  Max: 99.2 °F (37.3 °C)   Pulse   Pulse  Min: 75  Max: 103   Resp   Resp  Min: 12  Max: 58   BP   BP  Min: 129/82  Max: 258/118   O2 Sat   SpO2  Min: 96 %  Max: 100 %    No intake or output data in the 24 hours ending 03/18/24 0308    Diet NPO    Invasive Monitoring           Physical Exam   Physical Exam  Vitals reviewed.   Eyes:      General: Vision grossly intact. No scleral icterus.     Extraocular Movements: Extraocular movements intact.      Conjunctiva/sclera: Conjunctivae normal.      Pupils: Pupils are equal, round, and reactive to light.   Skin:     General: Skin is warm and dry.   HENT:      Head: Normocephalic and atraumatic.      Right Ear: Hearing normal.      Left Ear: Hearing normal.      Mouth/Throat:      Mouth: Mucous membranes are moist.   Neck:      Vascular: No JVD.   Cardiovascular:      Rate and Rhythm: Normal rate and regular rhythm.      Pulses: Normal pulses.      Heart sounds: Normal heart sounds.   Musculoskeletal:         General: Normal range of motion.      Right lower leg: No edema.      Left lower  "leg: No edema.   Abdominal: General: Bowel sounds are normal. There is no distension.      Palpations: Abdomen is soft.      Tenderness: There is no abdominal tenderness.   Constitutional:       General: He is not in acute distress.     Appearance: He is well-developed and well-nourished. He is not ill-appearing.      Interventions: He is not sedated and not intubated.  Pulmonary:      Effort: Pulmonary effort is normal. He is not intubated.      Breath sounds: Normal breath sounds.   Psychiatric:         Speech: Speech is not no receptive aphasia or no expressive aphasia.   Neurological:      Mental Status: He is alert and oriented to person, place and time. Mental status is at baseline. He is CAM ICU negative.      Cranial Nerves: Facial asymmetry (left side facial droop) present. No dysarthria.      Sensory: Sensory deficit (decreased sharp sensation of left side face, LUE and LLE) present.      Motor: Motor deficit (LUE: 4/5 strength, +drift. LLE: 4/5 strength, +drift.).            Diagnostic Studies      EKG:   3/18/2024 appears to be sinus tachycardia with biatrial enlargement.  bpm. QT/Qtc 408/540. No significant ST elevation or depression.      Imaging:  3/17/2024 CT stroke alert brain:  \"CT BRAIN - STROKE ALERT PROTOCOL     INDICATION:   Stroke Alert.     COMPARISON:  None.     TECHNIQUE:  CT examination of the brain was performed.  In addition to axial images, coronal reformatted images were created and submitted for interpretation.     Radiation dose length product (DLP) for this visit:  896.37 mGy-cm .  This examination, like all CT scans performed in the [facility], was performed utilizing techniques to minimize radiation dose exposure, including the use of iterative   reconstruction and automated exposure control.     IMAGE QUALITY:  Diagnostic.     FINDINGS:     PARENCHYMA: There is an old lacunar infarct identified within the right basal ganglia primarily involving the " "posterior lentiform nucleus with focal encephalomalacia. There is some low density surrounding this area of encephalomalacia which may represent   chronic gliosis. However, the possibility of subacute ivelisse-infarct ischemia is not excluded. Dedicated MRI with diffusion-weighted imaging recommended.     No mass, mass effect or midline shift. No hemorrhage. Unremarkable appearance of the posterior fossa.     Normal intracranial vasculature.     VENTRICLES AND EXTRA-AXIAL SPACES:  Normal for the patient's age.     VISUALIZED ORBITS:  Normal visualized orbits.     PARANASAL SINUSES:  Normal visualized paranasal sinuses.     CALVARIUM AND EXTRACRANIAL SOFT TISSUES:   Normal.     IMPRESSION:     Old lacunar infarction within the right basal ganglia. Mild surrounding low density which may represent chronic gliosis. However, the possibility of subacute ivelisse-infarct ischemia is not excluded. Recommend dedicated MRI of the brain with   diffusion-weighted imaging.\"     3/17/2024 CT stroke alert (H/N):   \"CTA NECK AND BRAIN WITH CONTRAST     INDICATION: Stroke Alert     COMPARISON:   None.     TECHNIQUE:   Post contrast imaging was performed after administration of iodinated contrast through the neck and brain. Post contrast axial 0.625 mm images timed to opacify the arterial system.     3D rendering was performed on an independent workstation.   MIP reconstructions performed. Coronal reconstructions were performed of the noncontrast portion of the brain.     Radiation dose length product (DLP) for this visit:  490.36 mGy-cm .  This examination, like all CT scans performed in the [facility], was performed utilizing techniques to minimize radiation dose exposure, including the use of iterative   reconstruction and automated exposure control.     IV Contrast:  100 mL of iohexol (OMNIPAQUE)     IMAGE QUALITY:   Diagnostic     FINDINGS:     CERVICAL VASCULATURE  AORTIC ARCH AND GREAT VESSELS:  Normal aortic arch and " great vessel origins. Normal visualized subclavian vessels.     RIGHT VERTEBRAL ARTERY CERVICAL SEGMENT:  Normal origin. The vessel is diffusely hypoplastic but consistent in caliber to the skull base.     LEFT VERTEBRAL ARTERY CERVICAL SEGMENT:  Normal origin. The vessel is normal in caliber throughout the neck.     RIGHT EXTRACRANIAL CAROTID SEGMENT:  Normal caliber common carotid artery.  Normal bifurcation and cervical internal carotid artery.  No stenosis or dissection.     LEFT EXTRACRANIAL CAROTID SEGMENT:  Normal caliber common carotid artery.  Normal bifurcation and cervical internal carotid artery.  No stenosis or dissection.     NASCET criteria was used to determine the degree of internal carotid artery diameter stenosis.        INTRACRANIAL VASCULATURE  INTERNAL CAROTID ARTERIES: There is mild smooth stenosis of the right cavernous internal carotid artery with no severe stenosis or large vessel occlusion. The left intracranial internal carotid artery is normal.     ANTERIOR CIRCULATION: Hypoplastic right A1 segment. Dominant left A1 segment. There is a small aneurysm of the anterior communicating artery arising at the junction of the left A1 and A2 segments and projecting superiorly, measuring approximately 2 mm in   size. Normal A2 and A3 segments.     MIDDLE CEREBRAL ARTERY CIRCULATION: Normal M1 segments. The middle cerebral artery branches are patent. There is focal atherosclerotic disease involving one of the M2 branches on the left, moderate with no M2 occlusion identified.     DISTAL VERTEBRAL ARTERIES: Distal left vertebral artery is dominant while the right is hypoplastic especially above the foramen magnum. The right terminates within the posterior inferior cerebellar artery without extension to the vertebrobasilar   junction.     BASILAR ARTERY:  Basilar artery is normal in caliber.  Normal superior cerebellar arteries.     POSTERIOR CEREBRAL ARTERIES: The right posterior cerebral artery is  "normal. The left appears to occlude just distal to its origin with possible partial reconstitution of distal branches.     VENOUS STRUCTURES:  Normal.        NON VASCULAR ANATOMY  BONY STRUCTURES:  No acute osseous abnormality.     SOFT TISSUES OF THE NECK:  Normal.     THORACIC INLET:  Unremarkable.        IMPRESSION:     Mild smooth stenosis of the right cavernous internal carotid artery.     There appears to be occlusion of the left posterior cerebral artery proximally with some distal reconstitution.     At least one of the anterior M2 branches on the left demonstrates moderate atherosclerotic narrowing.     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 Recommend consultation with the [facility] at [phone].\"    3/17/2024 CXR:   \"XR CHEST PORTABLE     INDICATION: Stroke alert.     COMPARISON: None     FINDINGS:     Clear lungs. No pneumothorax or pleural effusion.     Normal cardiomediastinal silhouette.     Bones are unremarkable for age.     Normal upper abdomen.     IMPRESSION:     No acute cardiopulmonary disease\"        I have personally reviewed pertinent reports.   and I have personally reviewed pertinent films in PACS     Medications:  Scheduled PRN   aspirin, 81 mg, Daily  atorvastatin, 40 mg, QPM  chlorhexidine, 15 mL, Q12H ARYA  enoxaparin, 40 mg, Daily  methadone, 105 mg, Daily With Breakfast  methadone, 110 mg, QPM  niCARdipine, ,       labetalol, 10 mg, Q4H PRN  niCARdipine, ,        Continuous    niCARdipine, 1-15 mg/hr         Labs:    CBC    Recent Labs     03/17/24  1226   WBC 7.01   HGB 12.9   HCT 38.7        BMP    Recent Labs     03/17/24  1226   SODIUM 134*   K 3.7      CO2 28   AGAP 5   BUN 17   CREATININE 1.08   CALCIUM 9.0       Coags    Recent Labs     03/17/24  1226   INR 1.10   PTT 21*        Additional Electrolytes  No recent results       Blood Gas    No " recent results  No recent results LFTs  Recent Labs     03/17/24  1226   ALT 15   AST 13   ALKPHOS 54   ALB 3.7   TBILI 0.55       Infectious  No recent results  Glucose  Recent Labs     03/17/24  1226   GLUC 120             ALEN Mills

## 2024-03-18 NOTE — CONSULTS
Please see neurology consult note by Dr. Emily Sanchez on 3/17/24 at [facility].     HPI: 32 YO M with history of uncontrolled HTN (prev on amlodipine and lisinopril) and prior heroin abuse now on methadone who presented to [facility] on 3/17/24 with L-sided weakness and unsteady gait. Of note, also with transient R-sided vision loss that resolved within minutes, and also experiencing daily 10/10 headaches. Hx limited given severe N/V. Extensive maternal family hx of stroke. LKN 3/16PM when going to bed and sx onset at 3am. Initial /119. OOW for TNK. NIHSS 5 (+1 L facial droop, +1 LUE drift, +1 LLE drift, 1+ LUE/LLE sensory loss, +1 mild to mod dysarthria). Initial CTH showed chronic R BG infarction. CTA H/N showed L PCA proximal occlusion with distal reconstitution, mod atherosclerotic narrowing of an anterior L M2 branch, and 2mm ACOM aneurysm at junction of L A1 and A2. Neuro endovascular team was notified and d/w Dr. Aly - L PCA occluded, however, given possibly atretic in nature without vision loss at this time, no indication for acute intervention. Patient was admitted for stroke work up, and transferred to [facility] for closer monitoring.    Plan: Cont. Stroke pathway. S/p ASA 324mg x 1, to be followed by ASA 81mg daily (replace with ASA 300mg rectal suppository if pt unable to tolerate PO intake). Cont Atorvastatin 40mg. Pending MRI brain (no clear indication for repeat CTH at this time, unless unable to obtain MRI brain), echo, A1c, lipid panel. Monitor on telemetry. Permissive HTN in first 24-48hrs, BP no greater than 220/120mmHg with gradual reduction to normotension. Normoglycemia (glucose <180), normothermia goals. Further w/u per Dr. Sanchez's recommendations on 3/17.    D/w'ed with NCC team, no additional immediate neurological needs at this time. Neurology team will continue to follow in AM.

## 2024-03-18 NOTE — ASSESSMENT & PLAN NOTE
- Monitor closely for neurogenic bladder. Follow UOP and encourage spontaneous voids. If unable to void spontaneously, will monitor with PVR bladder scans and initiate ISC regimen.

## 2024-03-18 NOTE — OCCUPATIONAL THERAPY NOTE
Occupational Therapy Evaluation     Patient Name: DEANNA Ashraf  Today's Date: 3/18/2024  Problem List  Principal Problem:    Cerebrovascular accident (CVA) due to occlusion of left posterior cerebral artery (HCC)  Active Problems:    Left-sided weakness    Aneurysm of anterior communicating artery    Methadone use    Hypertensive emergency    Impaired mobility and activities of daily living    At risk for altered urinary elimination    At risk for constipation    At high risk for venous thromboembolism (VTE)    At high risk for skin breakdown    Stroke-like symptoms    Past Medical History  Past Medical History:   Diagnosis Date    Hypertension      Past Surgical History  No past surgical history on file.          03/18/24 1127   OT Last Visit   OT Visit Date 03/18/24   Note Type   Note type Evaluation   Pain Assessment   Pain Assessment Tool 0-10   Pain Score No Pain   Restrictions/Precautions   Weight Bearing Precautions Per Order No   Other Precautions Chair Alarm;Bed Alarm;Multiple lines;Telemetry;Fall Risk   Home Living   Type of Home House   Home Layout One level;Able to live on main level with bedroom/bathroom  (1 [address], typically lives in basement, will be living on main floor during recovery)   Bathroom Shower/Tub Tub/shower unit   Bathroom Toilet Standard   Bathroom Equipment Grab bars in shower;Grab bars around toilet   Bathroom Accessibility Accessible   Home Equipment   (no DME at baseline)   Prior Function   Level of [place] Independent with ADLs;Independent with functional mobility;Independent with IADLS   Lives With Family;Spouse  (Parents, spouse)   Receives Help From Family   IADLs Independent with driving;Independent with meal prep;Independent with medication management   Falls in the last 6 months 0   Vocational Unemployed   Lifestyle   Autonomy I with ADLs/IADLs   Reciprocal Relationships lives with parents, spouse, provides care for mother   Service to Others unemployed   Intrinsic  "Gratification will continue to assess   Subjective   Subjective \"I usually help my mom, now she has to help me\"   ADL   Eating Assistance 5  Supervision/Setup   Grooming Assistance 4  Minimal Assistance   UB Bathing Assistance 4  Minimal Assistance   LB Bathing Assistance 3  Moderate Assistance   UB Dressing Assistance 4  Minimal Assistance   LB Dressing Assistance 3  Moderate Assistance   Toileting Assistance  3  Moderate Assistance   Bed Mobility   Supine to Sit 4  Minimal assistance   Additional items Assist x 1;HOB elevated   Additional Comments Pt found supine in bed, left OOB in bedside chair   Transfers   Sit to Stand 3  Moderate assistance   Additional items Assist x 1;Increased time required;Verbal cues   Stand to Sit 3  Moderate assistance   Additional items Assist x 1;Increased time required;Verbal cues   Functional Mobility   Functional Mobility 3  Moderate assistance   Additional Comments x1, HHA to bedside chair, ataxic gait, difficulty weight shifting to LLE, unsteady   Balance   Static Sitting Fair   Static Standing Poor +   Ambulatory Poor +   Activity Tolerance   Activity Tolerance Patient limited by fatigue   Medical Staff Made Aware PT present   Nurse Made Aware yes nsg cleared for session   RUE Assessment   RUE Assessment WFL   LUE Assessment   LUE Assessment WFL   Hand Function   Gross Motor Coordination Functional   Fine Motor Coordination Functional   Cognition   Overall Cognitive Status WFL   Arousal/Participation Cooperative;Alert   Attention Within functional limits   Orientation Level Oriented X4   Memory Within functional limits   Following Commands Follows one step commands without difficulty   Assessment   Limitation Decreased ADL status;Decreased UE strength;Decreased Safe judgement during ADL;Decreased endurance   Prognosis Good   Assessment Pt is a 33 y.o male who was admitted to [facility] on 3/18/24 after CVA due to occlusion of L posterior cerebral artery,  has a past medical history of " "Hypertension.  Prior to admit, pt with independent with ADLs/IADLs, drives independently. Pt does not use mobility aid at baseline, no fall history. Pt currently lives in a 1 story house with parents and spouse, pt reports living in basement, however will have 1st floor setup during recovery. Bathroom located on main floor contains grab bars in tub/shower, near toilet. 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 Currently pt requires supervision-min A for UB ADLs/bed mobility, mod A x1 transfers/functional mobility with HHA to bedside chair. Pt demonstrated an ataxic, unsteady gait during functional mobility, having difficulty lifting RLE, weightshifting to LLE due to weakness/decreased coordination. Pt is limited by decreased LUE/LLE strength/coordination, balance, endurance, activity tolerance, and safety awareness, limiting pt's ability to safely engage in functional tasks. Pt would continue to benefit from acute OT services, addressing below listed goals. Upon d/c, recommend lvl 1 resources.   Goals   LTG Time Frame 10-14   Long Term Goal 1) UB ADLs with supervision/setup  2) Bed mobility with supervision 3) LB ADLs supervision/setup 4) Transfers to/from all surfaces with fair+ balance/safety with supervision 5) Improve functional mobility during ADL/IADL tasks and demonstrate fair+ balance/safety with supervision 6) Increase standing tolerance/balance to 15 minutes with fair+ dynamic standing balance to assist with safe engagement in functional standing tasks 7) Improve activity tolerance to 35-40 minute treatment session, using 1-2 seated breaks as needed. 8) Demonstrate good carryover with spinal precautions during functional tasks 9) Assist with safe d/c recommendations   Plan   Treatment Interventions ADL retraining;Functional transfer training;UE strengthening/ROM;Endurance training;Fine motor coordination " activities;Activityengagement   Goal Expiration Date 04/01/24   OT Frequency 2-3x/wk   Discharge Recommendation   Rehab Resource Intensity Level, OT I (Maximum Resource Intensity)   AM-PAC Daily Activity Inpatient   Lower Body Dressing 2   Bathing 2   Toileting 2   Upper Body Dressing 3   Grooming 3   Eating 3   Daily Activity Raw Score 15   Daily Activity Standardized Score (Calc for Raw Score >=11) 34.69   AM-PAC Applied Cognition Inpatient   Following a Speech/Presentation 4   Understanding Ordinary Conversation 4   Taking Medications 3   Remembering Where Things Are Placed or Put Away 3   Remembering List of 4-5 Errands 3   Taking Care of Complicated Tasks 3   Applied Cognition Raw Score 20   Applied Cognition Standardized Score 41.76   End of Consult   Patient Position at End of Consult Bedside chair;Bed/Chair alarm activated;All needs within reach   Nurse Communication Nurse aware of consult   Ike Centeno

## 2024-03-18 NOTE — PROGRESS NOTES
Attending documentation I did: critical Care  Name: DEANNA Ashraf 33 y.o. male I MRN: [id number]  Unit/Bed#: ICU 03 I Date of Admission: 3/18/2024   Date of Service: 3/18/2024 I Hospital Day: 0      Patient is a 33-year-old male with a history of substance abuse on methadone since 2015 and hypertension with noncompliance with medication who presented due to acute stroke symptoms.  He initially presented to one of her outside campuses with complaint of weakness and decreased sensation in left arm and leg.  His systolic blood pressure was in the 200s upon presentation.  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  Cardene infusion was initiated and patient was started on 325 mg of aspirin.  Neurology was involved as well as neurosurgery and recommendation was transferred to [facility] for ongoing management.      Visit Vitals  BP (!) 176/79 (BP Location: Left arm)   Pulse 94   Temp 99.2 °F (37.3 °C) (Oral)   Resp 16   SpO2 96%   Smoking Status Every Day     GEN: NAD, awake and alert  HEENT: EOMI, PERRLA, MMM, left facial droop  CV: RRR,  no  Murmur  Resp:  CTA, no R/R/W  GI: soft,NT/ND  Neuro: Left facial droop, decreased sensation in the left upper and lower extremity as well as left face compared to the right.  Gross sensation is intact.  Patient is able to mobilize all extremities antigravity but has left upper extremity drift as well as left lower extremity ataxia.  No nystagmus appreciated, patient can gaze in all directions.  Normal speech without aphasia or dysarthria.  Patient is right-hand dominant.  Skin: warm, dry    All imaging and laboratory data reviewed.    Diagnosis Date Noted 2021   Atherosclerosis of aorta (CMS/HCC) 07/08/2021    Kidney disorder 07/08/2021    Mixed anxiety depressive disorder 07/08/2021    Outbursts of anger 07/08/2021    Prolonged QT interval 07/08/2021    Spasm of back muscles 07/08/2021    Tobacco user 07/08/2021    Chronic back pain  09/29/2016    Hypertensive disorder 09/29/2016     Family History   Problem Relation Age of Onset    Hypertension Mother    Kidney failure Mother    Blood clots Mother    Mental illness Father     Acute stroke symptoms with left PCA occlusion and right cavernous internal carotid artery stenosis and left M2 atherosclerotic narrowing with anterior communicating artery aneurysm A1/A2 junction-NIH 5  Hypertensive emergency  History of opioid abuse/substance abuse on methadone    Monitor patient's neurologic exam every hour.  Repeat imaging if patient has new focality to exam or decreasing GCS by greater than 2 points.  Continue with aspirin 325 mg p.o. daily.  Patient will need an MRI to further evaluate stroke burden.  Patient has aneurysms as well as stenotic areas unclear if underlying etiology is related to thrombosis versus vasculitis.  Laboratory data will be sent for evaluation.  CT chest abdomen pelvis to evaluate large vessels for signs of vasculitis.  Cannot rule out underlying collagen vascular disease.    Patient has hypertensive emergency.  He has known hypertension and has been noncompliant with medications.  He has a significant family history for hypertension as well.  Presently on Cardene infusion.  Goal systolic blood pressure 160-180.  Check renal artery duplex with history of uncontrolled hypertension.  Consider urine studies with uncontrolled hypertension.    Continue with methadone at present dose.  Avoid additional opioids for pain.    Critical care time 44 minutes, critical care time does not include procedures or family update.

## 2024-03-19 ENCOUNTER — APPOINTMENT (OUTPATIENT)
Dept: RADIOLOGY | Facility: HOSPITAL | Age: 34
DRG: 045 | End: 2024-03-19
Payer: COMMERCIAL

## 2024-03-19 PROBLEM — Z78.9 IMPAIRED MOBILITY AND ACTIVITIES OF DAILY LIVING: Status: RESOLVED | Noted: 2024-03-18 | Resolved: 2024-03-19

## 2024-03-19 PROBLEM — Z91.89 AT RISK FOR ALTERED URINARY ELIMINATION: Status: RESOLVED | Noted: 2024-03-18 | Resolved: 2024-03-19

## 2024-03-19 PROBLEM — Z74.09 IMPAIRED MOBILITY AND ACTIVITIES OF DAILY LIVING: Status: RESOLVED | Noted: 2024-03-18 | Resolved: 2024-03-19

## 2024-03-19 PROBLEM — Z91.89 AT HIGH RISK FOR SKIN BREAKDOWN: Status: RESOLVED | Noted: 2024-03-18 | Resolved: 2024-03-19

## 2024-03-19 PROBLEM — Z91.89 AT RISK FOR CONSTIPATION: Status: RESOLVED | Noted: 2024-03-18 | Resolved: 2024-03-19

## 2024-03-19 PROBLEM — Q61.3 POLYCYSTIC KIDNEY DISEASE: Status: ACTIVE | Noted: 2024-03-19

## 2024-03-19 PROBLEM — Z91.89 AT HIGH RISK FOR VENOUS THROMBOEMBOLISM (VTE): Status: RESOLVED | Noted: 2024-03-18 | Resolved: 2024-03-19

## 2024-03-19 LAB
ACE SERPL-CCNC: 44 U/L (ref 14–82)
ACE SERPL-CCNC: 49 U/L (ref 14–82)
ALBUMIN SERPL BCP-MCNC: 4 G/DL (ref 3.5–5)
ALP SERPL-CCNC: 58 U/L (ref 34–104)
ALT SERPL W P-5'-P-CCNC: 16 U/L (ref 7–52)
ANION GAP SERPL CALCULATED.3IONS-SCNC: 7 MMOL/L (ref 4–13)
ANION GAP SERPL CALCULATED.3IONS-SCNC: 9 MMOL/L (ref 4–13)
AST SERPL W P-5'-P-CCNC: 17 U/L (ref 13–39)
B2 GLYCOPROT1 IGA SERPL IA-ACNC: 1
B2 GLYCOPROT1 IGA SERPL IA-ACNC: 1.2
B2 GLYCOPROT1 IGG SERPL IA-ACNC: 0.9
B2 GLYCOPROT1 IGG SERPL IA-ACNC: 1.1
B2 GLYCOPROT1 IGM SERPL IA-ACNC: <2.4
B2 GLYCOPROT1 IGM SERPL IA-ACNC: <2.4
BASOPHILS # BLD AUTO: 0.06 THOUSANDS/ÂΜL (ref 0–0.1)
BASOPHILS NFR BLD AUTO: 1 % (ref 0–1)
BILIRUB SERPL-MCNC: 0.51 MG/DL (ref 0.2–1)
BUN SERPL-MCNC: 19 MG/DL (ref 5–25)
BUN SERPL-MCNC: 19 MG/DL (ref 5–25)
CALCIUM SERPL-MCNC: 9 MG/DL (ref 8.4–10.2)
CALCIUM SERPL-MCNC: 9.5 MG/DL (ref 8.4–10.2)
CARDIOLIPIN IGA SER IA-ACNC: 1.1
CARDIOLIPIN IGA SER IA-ACNC: 1.1
CARDIOLIPIN IGG SER IA-ACNC: 1
CARDIOLIPIN IGG SER IA-ACNC: 1.1
CARDIOLIPIN IGM SER IA-ACNC: 1
CARDIOLIPIN IGM SER IA-ACNC: <0.9
CH50 SERPL-ACNC: >60 U/ML
CHLORIDE SERPL-SCNC: 104 MMOL/L (ref 96–108)
CHLORIDE SERPL-SCNC: 104 MMOL/L (ref 96–108)
CO2 SERPL-SCNC: 26 MMOL/L (ref 21–32)
CO2 SERPL-SCNC: 26 MMOL/L (ref 21–32)
CREAT SERPL-MCNC: 0.88 MG/DL (ref 0.6–1.3)
CREAT SERPL-MCNC: 0.9 MG/DL (ref 0.6–1.3)
ENA SS-A AB SER-ACNC: <0.2 AI (ref 0–0.9)
ENA SS-A AB SER-ACNC: <0.2 AI (ref 0–0.9)
ENA SS-B AB SER-ACNC: <0.2 AI (ref 0–0.9)
ENA SS-B AB SER-ACNC: <0.2 AI (ref 0–0.9)
EOSINOPHIL # BLD AUTO: 0.14 THOUSAND/ÂΜL (ref 0–0.61)
EOSINOPHIL NFR BLD AUTO: 2 % (ref 0–6)
ERYTHROCYTE [DISTWIDTH] IN BLOOD BY AUTOMATED COUNT: 13.4 % (ref 11.6–15.1)
GFR SERPL CREATININE-BSD FRML MDRD: 110 ML/MIN/1.73SQ M
GFR SERPL CREATININE-BSD FRML MDRD: 111 ML/MIN/1.73SQ M
GLUCOSE SERPL-MCNC: 115 MG/DL (ref 65–140)
GLUCOSE SERPL-MCNC: 94 MG/DL (ref 65–140)
HCT VFR BLD AUTO: 38.7 % (ref 36.5–49.3)
HGB BLD-MCNC: 12.7 G/DL (ref 12–17)
IMM GRANULOCYTES # BLD AUTO: 0.03 THOUSAND/UL (ref 0–0.2)
IMM GRANULOCYTES NFR BLD AUTO: 0 % (ref 0–2)
LYMPHOCYTES # BLD AUTO: 2.88 THOUSANDS/ÂΜL (ref 0.6–4.47)
LYMPHOCYTES NFR BLD AUTO: 40 % (ref 14–44)
MAGNESIUM SERPL-MCNC: 2.2 MG/DL (ref 1.9–2.7)
MCH RBC QN AUTO: 27.6 PG (ref 26.8–34.3)
MCHC RBC AUTO-ENTMCNC: 32.8 G/DL (ref 31.4–37.4)
MCV RBC AUTO: 84 FL (ref 82–98)
MONOCYTES # BLD AUTO: 0.6 THOUSAND/ÂΜL (ref 0.17–1.22)
MONOCYTES NFR BLD AUTO: 8 % (ref 4–12)
NEUTROPHILS # BLD AUTO: 3.54 THOUSANDS/ÂΜL (ref 1.85–7.62)
NEUTS SEG NFR BLD AUTO: 49 % (ref 43–75)
NRBC BLD AUTO-RTO: 0 /100 WBCS
PHOSPHATE SERPL-MCNC: 2.3 MG/DL (ref 2.7–4.5)
PHOSPHATE SERPL-MCNC: 3 MG/DL (ref 2.7–4.5)
PLATELET # BLD AUTO: 169 THOUSANDS/UL (ref 149–390)
PMV BLD AUTO: 11 FL (ref 8.9–12.7)
POTASSIUM SERPL-SCNC: 3.4 MMOL/L (ref 3.5–5.3)
POTASSIUM SERPL-SCNC: 3.8 MMOL/L (ref 3.5–5.3)
PROT SERPL-MCNC: 6.3 G/DL (ref 6.4–8.4)
RBC # BLD AUTO: 4.6 MILLION/UL (ref 3.88–5.62)
RHEUMATOID FACT SER QL LA: NEGATIVE
SODIUM SERPL-SCNC: 137 MMOL/L (ref 135–147)
SODIUM SERPL-SCNC: 139 MMOL/L (ref 135–147)
WBC # BLD AUTO: 7.25 THOUSAND/UL (ref 4.31–10.16)

## 2024-03-19 PROCEDURE — NC001 PR NO CHARGE: Performed by: INTERNAL MEDICINE

## 2024-03-19 PROCEDURE — 85025 COMPLETE CBC W/AUTO DIFF WBC: CPT

## 2024-03-19 PROCEDURE — 84100 ASSAY OF PHOSPHORUS: CPT

## 2024-03-19 PROCEDURE — 99255 IP/OBS CONSLTJ NEW/EST HI 80: CPT | Performed by: INTERNAL MEDICINE

## 2024-03-19 PROCEDURE — 80053 COMPREHEN METABOLIC PANEL: CPT

## 2024-03-19 PROCEDURE — 80048 BASIC METABOLIC PNL TOTAL CA: CPT

## 2024-03-19 PROCEDURE — 70553 MRI BRAIN STEM W/O & W/DYE: CPT

## 2024-03-19 PROCEDURE — 83735 ASSAY OF MAGNESIUM: CPT

## 2024-03-19 PROCEDURE — A9585 GADOBUTROL INJECTION: HCPCS | Performed by: INTERNAL MEDICINE

## 2024-03-19 RX ORDER — GABAPENTIN 100 MG/1
200 CAPSULE ORAL ONCE
Status: COMPLETED | OUTPATIENT
Start: 2024-03-19 | End: 2024-03-19

## 2024-03-19 RX ORDER — AMOXICILLIN 250 MG
1 CAPSULE ORAL 2 TIMES DAILY PRN
Status: DISCONTINUED | OUTPATIENT
Start: 2024-03-19 | End: 2024-03-23 | Stop reason: HOSPADM

## 2024-03-19 RX ORDER — CARVEDILOL 12.5 MG/1
12.5 TABLET ORAL 2 TIMES DAILY WITH MEALS
Status: DISCONTINUED | OUTPATIENT
Start: 2024-03-19 | End: 2024-03-23 | Stop reason: HOSPADM

## 2024-03-19 RX ORDER — POLYETHYLENE GLYCOL 3350 17 G/17G
17 POWDER, FOR SOLUTION ORAL DAILY
Status: DISCONTINUED | OUTPATIENT
Start: 2024-03-19 | End: 2024-03-19

## 2024-03-19 RX ORDER — POLYETHYLENE GLYCOL 3350 17 G/17G
17 POWDER, FOR SOLUTION ORAL DAILY PRN
Status: DISCONTINUED | OUTPATIENT
Start: 2024-03-19 | End: 2024-03-23 | Stop reason: HOSPADM

## 2024-03-19 RX ORDER — LISINOPRIL 5 MG/1
5 TABLET ORAL DAILY
Status: DISCONTINUED | OUTPATIENT
Start: 2024-03-19 | End: 2024-03-21

## 2024-03-19 RX ORDER — LORAZEPAM 2 MG/ML
0.5 INJECTION INTRAMUSCULAR ONCE
Status: COMPLETED | OUTPATIENT
Start: 2024-03-19 | End: 2024-03-19

## 2024-03-19 RX ORDER — METHADONE HYDROCHLORIDE 10 MG/ML
105 CONCENTRATE ORAL DAILY
Status: DISCONTINUED | OUTPATIENT
Start: 2024-03-19 | End: 2024-03-23 | Stop reason: HOSPADM

## 2024-03-19 RX ORDER — ACETAMINOPHEN 325 MG/1
975 TABLET ORAL EVERY 8 HOURS PRN
Status: DISCONTINUED | OUTPATIENT
Start: 2024-03-19 | End: 2024-03-20

## 2024-03-19 RX ORDER — HYDRALAZINE HYDROCHLORIDE 20 MG/ML
5 INJECTION INTRAMUSCULAR; INTRAVENOUS ONCE
Status: COMPLETED | OUTPATIENT
Start: 2024-03-19 | End: 2024-03-19

## 2024-03-19 RX ORDER — LISINOPRIL 10 MG/1
10 TABLET ORAL DAILY
Status: DISCONTINUED | OUTPATIENT
Start: 2024-03-19 | End: 2024-03-19

## 2024-03-19 RX ORDER — LISINOPRIL 5 MG/1
5 TABLET ORAL DAILY
Status: DISCONTINUED | OUTPATIENT
Start: 2024-03-20 | End: 2024-03-19

## 2024-03-19 RX ORDER — GADOBUTROL 604.72 MG/ML
8 INJECTION INTRAVENOUS
Status: COMPLETED | OUTPATIENT
Start: 2024-03-19 | End: 2024-03-19

## 2024-03-19 RX ORDER — HYDRALAZINE HYDROCHLORIDE 20 MG/ML
5 INJECTION INTRAMUSCULAR; INTRAVENOUS EVERY 4 HOURS PRN
Status: DISCONTINUED | OUTPATIENT
Start: 2024-03-19 | End: 2024-03-20

## 2024-03-19 RX ORDER — POTASSIUM CHLORIDE 20 MEQ/1
40 TABLET, EXTENDED RELEASE ORAL ONCE
Status: COMPLETED | OUTPATIENT
Start: 2024-03-19 | End: 2024-03-19

## 2024-03-19 RX ORDER — LABETALOL HYDROCHLORIDE 5 MG/ML
10 INJECTION, SOLUTION INTRAVENOUS EVERY 4 HOURS PRN
Status: DISCONTINUED | OUTPATIENT
Start: 2024-03-19 | End: 2024-03-20

## 2024-03-19 RX ADMIN — LORAZEPAM 0.5 MG: 2 INJECTION INTRAMUSCULAR; INTRAVENOUS at 05:44

## 2024-03-19 RX ADMIN — HYDRALAZINE HYDROCHLORIDE 5 MG: 20 INJECTION, SOLUTION INTRAMUSCULAR; INTRAVENOUS at 18:13

## 2024-03-19 RX ADMIN — AMLODIPINE BESYLATE 10 MG: 10 TABLET ORAL at 08:58

## 2024-03-19 RX ADMIN — LISINOPRIL 5 MG: 5 TABLET ORAL at 16:29

## 2024-03-19 RX ADMIN — METHADONE HYDROCHLORIDE 105 MG: 10 CONCENTRATE ORAL at 07:54

## 2024-03-19 RX ADMIN — CHLORHEXIDINE GLUCONATE 15 ML: 1.2 SOLUTION ORAL at 08:59

## 2024-03-19 RX ADMIN — METHADONE HYDROCHLORIDE 120 MG: 10 TABLET ORAL at 18:55

## 2024-03-19 RX ADMIN — POTASSIUM PHOSPHATE, MONOBASIC POTASSIUM PHOSPHATE, DIBASIC 21 MMOL: 224; 236 INJECTION, SOLUTION, CONCENTRATE INTRAVENOUS at 10:18

## 2024-03-19 RX ADMIN — ENOXAPARIN SODIUM 40 MG: 40 INJECTION SUBCUTANEOUS at 08:59

## 2024-03-19 RX ADMIN — NICARDIPINE HYDROCHLORIDE 2.5 MG/HR: 2.5 INJECTION, SOLUTION INTRAVENOUS at 01:54

## 2024-03-19 RX ADMIN — ASPIRIN 81 MG CHEWABLE TABLET 81 MG: 81 TABLET CHEWABLE at 08:58

## 2024-03-19 RX ADMIN — CARVEDILOL 6.25 MG: 6.25 TABLET, FILM COATED ORAL at 07:55

## 2024-03-19 RX ADMIN — LABETALOL HYDROCHLORIDE 10 MG: 5 INJECTION, SOLUTION INTRAVENOUS at 14:51

## 2024-03-19 RX ADMIN — HYDRALAZINE HYDROCHLORIDE 5 MG: 20 INJECTION, SOLUTION INTRAMUSCULAR; INTRAVENOUS at 13:19

## 2024-03-19 RX ADMIN — ACETAMINOPHEN 975 MG: 325 TABLET, FILM COATED ORAL at 21:57

## 2024-03-19 RX ADMIN — LABETALOL HYDROCHLORIDE 10 MG: 5 INJECTION, SOLUTION INTRAVENOUS at 04:03

## 2024-03-19 RX ADMIN — POTASSIUM CHLORIDE 40 MEQ: 1500 TABLET, EXTENDED RELEASE ORAL at 08:58

## 2024-03-19 RX ADMIN — HYDRALAZINE HYDROCHLORIDE 5 MG: 20 INJECTION INTRAMUSCULAR; INTRAVENOUS at 16:30

## 2024-03-19 RX ADMIN — ATORVASTATIN CALCIUM 40 MG: 40 TABLET, FILM COATED ORAL at 18:16

## 2024-03-19 RX ADMIN — GABAPENTIN 200 MG: 100 CAPSULE ORAL at 21:59

## 2024-03-19 RX ADMIN — CHLORHEXIDINE GLUCONATE 15 ML: 1.2 SOLUTION ORAL at 20:50

## 2024-03-19 RX ADMIN — GADOBUTROL 8 ML: 604.72 INJECTION INTRAVENOUS at 22:53

## 2024-03-19 RX ADMIN — LABETALOL HYDROCHLORIDE 10 MG: 5 INJECTION, SOLUTION INTRAVENOUS at 00:20

## 2024-03-19 RX ADMIN — CARVEDILOL 12.5 MG: 12.5 TABLET, FILM COATED ORAL at 16:01

## 2024-03-19 NOTE — PROGRESS NOTES
Critical Care Interval Transfer Note:    Please refer to progress note from earlier today for full details.     HPI  DEANNA Ashraf is a 34 yo male with a PMHx HTN (med noncompliance) and substance abuse disorder on Methadone, who presented as a transfer from [facility] on 3/18 due to acute stroke. Pt presented with c/o weakness and decreased sensation of the left arm and leg. Found to have left-sided weakness, facial droop, with hypertensive urgency with SBP 250s/120s. 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 Pt placed on cardene infusion and loaded with 325mg ASA. Neuro consulted with recs to transfer to [facility] for possible Nsgy intervention.     - No intervention by NSGY planned to date  - Concern for vasculitis/inflammatory disorder or renal artery stenosis contributing to HTN and stroke pattern - inflammatory workup pending  - Labs still pending  ANCA  Total complement  Sjogren's  ACE        - Abnormal labs  Thrombosis panel  CRP    - Renal US with L ESTELITA and PKD  - Family Hx of PKD; Mom states she has it and clonidine is the only antihypertensive to work for her  - Increase PO antihypertensives as needed, consider clonidine.   - MRI ordered and pending  - Echo complete    Barriers to discharge:   Stroke pathway     Consults: IP CONSULT TO NEUROCRITICAL CARE  IP CONSULT TO NUTRITION SERVICES  IP CONSULT TO CASE MANAGEMENT  IP CONSULT TO PHYSICAL MEDICINE REHAB  IP CONSULT TO NEUROLOGY  IP CONSULT TO NEPHROLOGY    Recommended to review admission imaging for incidental findings and document in discharge navigator: Chart reviewed, no known incidental findings noted at this time.      Discharge Plan: Anticipate discharge in >72 hrs to discharge location to be determined pending rehab evaluations.            Patient seen and evaluated by Critical Care today and deemed to be appropriate for transfer to Stepdown Level 2. Spoke to Dr. Duff from [place] to accept transfer.  Critical care can be contacted via [facility] with any questions or concerns.

## 2024-03-19 NOTE — CASE MANAGEMENT
Case Management Assessment & Discharge Planning Note    Patient name DEANNA Ashraf  Location [facility] 719/[facility] 719-01 MRN [id number]  : 1990 Date 3/19/2024       Current Admission Date: 3/18/2024  Current Admission Diagnosis:Cerebrovascular accident (CVA) due to occlusion of left posterior cerebral artery (HCC)   Patient Active Problem List    Diagnosis Date Noted    Impaired mobility and activities of daily living 2024    At risk for altered urinary elimination 2024    At risk for constipation 2024    At high risk for venous thromboembolism (VTE) 2024    At high risk for skin breakdown 2024    Stroke-like symptoms 2024    Cerebrovascular accident (CVA) due to occlusion of left posterior cerebral artery (HCC) 2024    Left-sided weakness 2024    Aneurysm of anterior communicating artery 2024    Methadone use 2024    Hypertensive emergency 2024      LOS (days): 1  Geometric Mean LOS (GMLOS) (days):   Days to GMLOS:     OBJECTIVE:    Risk of Unplanned Readmission Score: 13.52         Current admission status: Inpatient       Preferred Pharmacy:   [facility] [address]  [address] [address]  Phone: [phone] Fax: [phone]    Primary Care Provider: No primary care provider on file.    Primary Insurance: [employer]  Secondary Insurance:     ASSESSMENT:  Active Health Care Proxies    There are no active Health Care Proxies on file.                 Readmission Root Cause  30 Day Readmission: No    Patient Information  Admitted from:: Home  Mental Status: Other (Comment)  During Assessment patient was accompanied by: Not accompanied during assessment  Assessment information provided by:: Spouse  Primary Caregiver: Self  Support Systems: Self, Spouse/significant other, Parent  County of Residence: [facility]  What city do you live in?: [place]  Type of Current Residence: Other  (Comment) (private residence)  Living Arrangements: Lives w/ Spouse/significant other, Lives w/ Parent(s)  Is patient a ?: No    Activities of Daily Living Prior to Admission  Functional Status: Independent  Completes ADLs independently?: Yes  Ambulates independently?: Yes  Does patient use assisted devices?: No  Does patient currently own DME?: No  Does patient have a history of Outpatient Therapy (PT/OT)?: No  Does the patient have a history of Short-Term Rehab?: No  Does patient have a history of HHC?: No  Does patient currently have HHC?: No         Patient Information Continued  Income Source: Unemployed  Does patient have prescription coverage?: Yes  Does patient receive dialysis treatments?: No  Does patient have a history of substance abuse?: Yes  Historical substance use preference: Methamphetamines  Is patient currently in treatment for substance abuse?: Yes [facility]  Does patient have a history of Mental Health Diagnosis?: No         Means of Transportation  Means of Transport to Appts:: Drives Self      Social Determinants of Health (SDOH)      Flowsheet Row Most Recent Value   Housing Stability    In the last 12 months, was there a time when you were not able to pay the mortgage or rent on time? N   In the last 12 months, how many places have you lived? 1   In the last 12 months, was there a time when you did not have a steady place to sleep or slept in a shelter (including now)? N   Transportation Needs    In the past 12 months, has lack of transportation kept you from medical appointments or from getting medications? no   In the past 12 months, has lack of transportation kept you from meetings, work, or from getting things needed for daily living? No   Food Insecurity    Within the past 12 months, you worried that your food would run out before you got the money to buy more. Never true   Within the past 12 months, the food you bought just didn't last and  you didn't have money to get more. Never true   Utilities    In the past 12 months has the electric, gas, oil, or water company threatened to shut off services in your home? No            DISCHARGE DETAILS:    Discharge planning discussed with:: spouse Geno via phone  Freedom of Choice: No  Comments - Freedom of Choice: No aftercare reccs at this time  CM contacted family/caregiver?: Yes             Contacts  Patient Contacts: Geno Ashraf  Relationship to Patient:: Family (spouse)  Contact Method: Phone  Phone Number: [phone]  Reason/Outcome: Emergency Contact, Discharge Planning  Would you like to participate in our [facility] service program?  : No - Declined              Additional Comments: CM spoke wiht pt spouse via phone. Pt has been on methadone maintance for several years at [facility], spouse stated pt last use was several years ago. Spouse would liek medical updates

## 2024-03-19 NOTE — ASSESSMENT & PLAN NOTE
Diagnosis: Substance use disorder  Home medication: methadone. Methadone clinic: [facility] in [place], [place]; confirmed dose with them. Pt denies illicit drug use. UDS on 3/17 +methadone only.  Plan:  Monitor for s/s of withdrawal  105 morning dose (oral solution), 120 night dose (tablets)

## 2024-03-19 NOTE — CASE MANAGEMENT
Case Management Discharge Planning Note    Patient name DEANNA Ashraf  Location [facility] 719/[facility] 719-01 MRN [id number]  : 1990 Date 3/19/2024       Current Admission Date: 3/18/2024  Current Admission Diagnosis:Cerebrovascular accident (CVA) due to occlusion of left posterior cerebral artery (HCC)   Patient Active Problem List    Diagnosis Date Noted    Impaired mobility and activities of daily living 2024    At risk for altered urinary elimination 2024    At risk for constipation 2024    At high risk for venous thromboembolism (VTE) 2024    At high risk for skin breakdown 2024    Stroke-like symptoms 2024    Cerebrovascular accident (CVA) due to occlusion of left posterior cerebral artery (HCC) 2024    Left-sided weakness 2024    Aneurysm of anterior communicating artery 2024    Methadone use 2024    Hypertensive emergency 2024      LOS (days): 1  Geometric Mean LOS (GMLOS) (days):   Days to GMLOS:     OBJECTIVE:  Risk of Unplanned Readmission Score: 13.52         Current admission status: Inpatient   Preferred Pharmacy:   [place], [place] - [address] [facility]  [address] [address]  Phone: [phone] Fax: [phone]    Primary Care Provider: No primary care provider on file.    Primary Insurance: [employer]  Secondary Insurance:     DISCHARGE DETAILS:           Additional Comments: Phone messge to pt spouse Geno [phone] requested a return call

## 2024-03-19 NOTE — PROGRESS NOTES
"[facility]  Progress Note: Critical Care  Name: DEANNA Ashraf 33 y.o. male I MRN: [id number]  Unit/Bed#: [facility] 719-01 I Date of Admission: 3/18/2024   Date of Service: 3/19/2024 I Hospital Day: 1    Assessment/Plan   Neuro:   Diagnosis: Left PCA occlusion, 2mm aneurysm of SHARAD at junction of A1 and A2 segments  Diagnosis: Old lacunar infarction within the right basal ganglia.   UDS: +methadone  Autoimmune/inflammatory workup thus far: CRP 5.13 (elevated).  WDL: lactic, homocystine, sed rate, TSH, C3, C4.   COVID/Flu/RSV: negative  Plan:   Cardene infusion to maintain strict SBP control: 160-180mmg; consider liberalizing if CTA dissection protocol imaging negative  Q1 neuro exams  Neurology consulted; recommend SBP  MRI brain pending  Pending autoimmune/inflammatory labs: sjogrens ab, thrombosis panel, ACE, C50, RF, ANCA, NISHA  Migraine cocktail as needed (AVOID using NSAIDS, Triptans, steroids and DHE)   Continue ASA 81mg and Lipitor 40mg daily  TTE - pending  Attempt to wean cardene - patient not receiving PRNs. Will D/c cardene and will increase PRN hydralazine and labetalol usage. If requiring frequent PRNs, increase Coreg from 6.25 to 12.5.   BG control: 140-180    Diagnosis: Substance use disorder  Home medication: methadone.   M[facility]adone clinic: [facility] in [place], [place]; confirmed dose with them   Pt denies illicit drug use. UDS on 3/17 +methadone only.  Plan:  Monitor for s/s of withdrawal    CV:   Diagnosis: HTN; possibly secondary to renal artery stenosis  Plan: See plan under \"neuro\"    Pulm:  No active issues    GI:   No active issues    :   No active issues    F/E/N:   F: Clear liquids  E: K > 4, phos > 3, Mg > 2  N: Dental softs    Heme/Onc:   No active issues    Endo:   No active issues    ID:   No active issues    MSK/Skin:   No active issues    Disposition: Critical care    ICU Core Measures     A: Assess, Prevent, and Manage Pain Has pain " been assessed? Yes  Need for changes to pain regimen? No   B: Both SAT/SAT  N/A   C: Choice of Sedation RASS Goal: 0 Alert and Calm or N/A patient not on sedation  Need for changes to sedation or analgesia regimen? NA   D: Delirium CAM-ICU: Positive   E: Early Mobility  Plan for early mobility? Yes   F: Family Engagement Plan for family engagement today? NA         Prophylaxis:  VTE VTE covered by:  enoxaparin, Subcutaneous, 40 mg at 03/18/24 0956       Stress Ulcer  not ordered        Significant 24hr Events     24hr events: Patient initially weaned from cardene yesterday but was placed back on as hypertnesive. Only complaint of headache which improves with Tylenol     Subjective     Review of Systems   Constitutional:  Negative for activity change and appetite change.   HENT:  Negative for congestion.    Gastrointestinal:  Negative for abdominal distention, abdominal pain, nausea and vomiting.   Neurological:  Positive for weakness. Negative for dizziness and light-headedness.        Objective                            Vitals I/O      Most Recent Min/Max in 24hrs   Temp 98.1 °F (36.7 °C) Temp  Min: 98.1 °F (36.7 °C)  Max: 98.1 °F (36.7 °C)   Pulse 82 Pulse  Min: 63  Max: 126   Resp 16 Resp  Min: 10  Max: 84   /88 BP  Min: 132/61  Max: 214/102   O2 Sat 98 % SpO2  Min: 96 %  Max: 99 %      Intake/Output Summary (Last 24 hours) at 3/19/2024 0818  Last data filed at 3/18/2024 2101  Gross per 24 hour   Intake 1342.5 ml   Output 750 ml   Net 592.5 ml       Diet Regular; Regular House; Dysphagia 3-Dental Soft; Thin Liquid    Invasive Monitoring           Physical Exam   Physical Exam  Eyes:      Extraocular Movements: Extraocular movements intact.      Pupils: Pupils are equal, round, and reactive to light.   Skin:     General: Skin is warm.   HENT:      Head: Normocephalic and atraumatic.      Nose: No congestion.   Abdominal:      Palpations: Abdomen is soft.      Tenderness: There is no abdominal tenderness.    Constitutional:       Appearance: He is well-developed and well-nourished. He is not ill-appearing.   Pulmonary:      Effort: Pulmonary effort is normal. No accessory muscle usage, respiratory distress or accessory muscle usage.   Neurological:      Mental Status: He is alert and oriented to person, place and time. Mental status is at baseline.      Comments: Strength 5/5 RUE ,RLE  Strength 4-5/5 LUE, LLE            Diagnostic Studies      EKG:   Imaging:  I have personally reviewed pertinent reports.       Medications:  Scheduled PRN   amLODIPine, 10 mg, Daily  aspirin, 81 mg, Daily  atorvastatin, 40 mg, QPM  carvedilol, 6.25 mg, BID With Meals  chlorhexidine, 15 mL, Q12H ARYA  enoxaparin, 40 mg, Daily  methadone, 105 mg, Daily  methadone, 120 mg, QPM  potassium chloride, 40 mEq, Once  potassium phosphate, 21 mmol, Once      acetaminophen, 650 mg, Q6H PRN  labetalol, 10 mg, Q4H PRN       Continuous    niCARdipine, 1-15 mg/hr, Last Rate: 2.5 mg/hr (03/19/24 0424)         Labs:    CBC    Recent Labs     03/18/24  0501 03/19/24  0509   WBC 7.09 7.25   HGB 13.8 12.7   HCT 41.6 38.7    169     BMP    Recent Labs     03/18/24  0501 03/19/24  0509   SODIUM 137 139   K 3.5 3.4*    104   CO2 25 26   AGAP 12 9   BUN 18 19   CREATININE 1.07 0.90   CALCIUM 9.2 9.0       Coags    Recent Labs     03/17/24  1226   INR 1.10   PTT 21*        Additional Electrolytes  Recent Labs     03/18/24  0501 03/19/24  0509   MG 2.1 2.2   PHOS 3.7 2.3*   CAIONIZED 1.16  --           Blood Gas    No recent results  No recent results LFTs  Recent Labs     03/18/24  0501 03/19/24  0509   ALT 17 16   AST 17 17   ALKPHOS 66 58   ALB 4.2 4.0   TBILI 0.60 0.51       Infectious  No recent results  Glucose  Recent Labs     03/17/24  1226 03/18/24  0501 03/19/24  0509   GLUC 120 112 94               Mel Pressley MD

## 2024-03-19 NOTE — ASSESSMENT & PLAN NOTE
"Diagnosis: Left PCA occlusion   Patient presented with left-sided weakness/numbness, unsteady gait, 10/10 headache, and transient right-sided vision loss (resolved within minutes). /119 on arrival.  NIHSS 5 (left facial droop, LUE/LLE drift, LUE/LLE sensory loss, mild/moderate dysarthria). CT head showed chronic right basal ganglia lacunar infarct.  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  Patient was loaded with aspirin 324 mg once, started on Cardene drip, and [facility] intervention.  Plan:  Repeat thrombosis panel 6 weeks  Migraine cocktail as needed (AVOID using NSAIDS, Triptans, steroids and DHE)   Continue ASA 81mg and Lipitor 40mg daily, started on Plavix for 21 days of DAPT then ASA monotherapy  [place] bubble study ordered  Coreg 12.5mg BID, lisinopril 5mg, hydralazine 5mg PRN, and labetalol PRN currently ordered for BP control.  Systolic blood pressure goal 120-140 \"normotensive\"  Neurology following   Will require zio patch  Follow up on rehab needs  No NSGY intervention, PCA chronic with collaterals present  "

## 2024-03-19 NOTE — PLAN OF CARE
Problem: PAIN - ADULT  Goal: Verbalizes/displays adequate comfort level or baseline comfort level  Description: Interventions:  - Encourage patient to monitor pain and request assistance  - Assess pain using appropriate pain scale  - Administer analgesics based on type and severity of pain and evaluate response  - Implement non-pharmacological measures as appropriate and evaluate response  - Consider cultural and social influences on pain and pain management  - Notify physician/advanced practitioner if interventions unsuccessful or patient reports new pain  Outcome: Progressing     Problem: INFECTION - ADULT  Goal: Absence or prevention of progression during hospitalization  Description: INTERVENTIONS:  - Assess and monitor for signs and symptoms of infection  - Monitor lab/diagnostic results  - Monitor all insertion sites, i.e. indwelling lines, tubes, and drains  - Monitor endotracheal if appropriate and nasal secretions for changes in amount and color  - [place] appropriate cooling/warming therapies per order  - Administer medications as ordered  - Instruct and encourage patient and family to use good hand hygiene technique  - Identify and instruct in appropriate isolation precautions for identified infection/condition  Outcome: Progressing  Goal: Absence of fever/infection during neutropenic period  Description: INTERVENTIONS:  - Monitor WBC    Outcome: Progressing     Problem: SAFETY ADULT  Goal: Patient will remain free of falls  Description: INTERVENTIONS:  - Educate patient/family on patient safety including physical limitations  - Instruct patient to call for assistance with activity   - Consult OT/PT to assist with strengthening/mobility   - Keep Call bell within reach  - Keep bed low and locked with side rails adjusted as appropriate  - Keep care items and personal belongings within reach  - Initiate and maintain comfort rounds  - Make Fall Risk Sign visible to staff  -- Consider moving patient to  room near nurses station  Outcome: Progressing  Goal: Maintain or return to baseline ADL function  Description: INTERVENTIONS:  -  Assess patient's ability to carry out ADLs; assess patient's baseline for ADL function and identify physical deficits which impact ability to perform ADLs (bathing, care of mouth/teeth, toileting, grooming, dressing, etc.)  - Assess/evaluate cause of self-care deficits   - Assess range of motion  - Assess patient's mobility; develop plan if impaired  - Assess patient's need for assistive devices and provide as appropriate  - Encourage maximum independence but intervene and supervise when necessary  - Involve family in performance of ADLs  - Assess for home care needs following discharge   - Consider OT consult to assist with ADL evaluation and planning for discharge  - Provide patient education as appropriate  Outcome: Progressing  Goal: Maintains/Returns to pre admission functional level  Description: INTERVENTIONS:  - Perform AM-PAC 6 Click Basic Mobility/ Daily Activity assessment daily.  - Set and communicate daily mobility goal to care team and patient/family/caregiver.   - Collaborate with rehabilitation services on mobility goals if consulted    Problem: SAFETY ADULT  Goal: Patient will remain free of falls  Description: INTERVENTIONS:  - Educate patient/family on patient safety including physical limitations  - Instruct patient to call for assistance with activity   - Consult OT/PT to assist with strengthening/mobility   - Keep Call bell within reach  - Keep bed low and locked with side rails adjusted as appropriate  - Keep care items and personal belongings within reach  - Initiate and maintain comfort rounds  - Make Fall Risk Sign visible to staff  - Offer Toileting every 2 Hours, in advance of need  - Initiate/Maintain bed alarm  - Obtain necessary fall risk management equipment: non skid footwear  - Apply yellow socks and bracelet for high fall risk patients  - Consider  moving patient to room near nurses station  3/19/2024 0751 by Jaun Servin RN  Outcome: Progressing  3/19/2024 0745 by Jaun Servin RN  Outcome: Progressing  Goal: Maintain or return to baseline ADL function  Description: INTERVENTIONS:  -  Assess patient's ability to carry out ADLs; assess patient's baseline for ADL function and identify physical deficits which impact ability to perform ADLs (bathing, care of mouth/teeth, toileting, grooming, dressing, etc.)  - Assess/evaluate cause of self-care deficits   - Assess range of motion  - Assess patient's mobility; develop plan if impaired  - Assess patient's need for assistive devices and provide as appropriate  - Encourage maximum independence but intervene and supervise when necessary  - Involve family in performance of ADLs  - Assess for home care needs following discharge   - Consider OT consult to assist with ADL evaluation and planning for discharge  - Provide patient education as appropriate  3/19/2024 0751 by Jaun Servin RN  Outcome: Progressing  3/19/2024 0745 by Jaun Servin RN  Outcome: Progressing  Goal: Maintains/Returns to pre admission functional level  Description: INTERVENTIONS:  - Perform AM-PAC 6 Click Basic Mobility/ Daily Activity assessment daily.  - Set and communicate daily mobility goal to care team and patient/family/caregiver.   - Collaborate with rehabilitation services on mobility goals if consulted  - Perform Range of Motion 3 times a day.  - Reposition patient every 2 hours.  - Dangle patient 3 times a day  - Stand patient 3 times a day  - Ambulate patient 3 times a day  - Out of bed to chair 3 times a day   - Out of bed for meals 3 times a day  - Out of bed for toileting  - Record patient progress and toleration of activity level   3/19/2024 0751 by Jaun Servin RN  Outcome: Progressing  3/19/2024 0745 by Jaun Servin RN  Outcome: Progressing     Problem: DISCHARGE PLANNING  Goal: Discharge to home or other  facility with appropriate resources  Description: INTERVENTIONS:  - Identify barriers to discharge w/patient and caregiver  - Arrange for needed discharge resources and transportation as appropriate  - Identify discharge learning needs (meds, wound care, etc.)  - Arrange for interpretive services to assist at discharge as needed  - Refer to Case Management Department for coordinating discharge planning if the patient needs post-hospital services based on physician/advanced practitioner order or complex needs related to functional status, cognitive ability, or social support system  3/19/2024 0751 by Jaun Servin RN  Outcome: Progressing  3/19/2024 0745 by Jaun Servin RN  Outcome: Progressing     Problem: Knowledge Deficit  Goal: Patient/family/caregiver demonstrates understanding of disease process, treatment plan, medications, and discharge instructions  Description: Complete learning assessment and assess knowledge base.  Interventions:  - Provide teaching at level of understanding  - Provide teaching via preferred learning methods  3/19/2024 0751 by Jaun Servin RN  Outcome: Progressing  3/19/2024 0745 by Jaun Servin RN  Outcome: Progressing     Problem: Neurological Deficit  Goal: Neurological status is stable or improving  Description: Interventions:  - Monitor and assess patient's level of consciousness, motor function, sensory function, and level of assistance needed for ADLs.   - Monitor and report changes from baseline. Collaborate with interdisciplinary team to initiate plan and implement interventions as ordered.   - Provide and maintain a safe environment.  - Consider seizure precautions.  - Consider fall precautions.  - Consider aspiration precautions.  - Consider bleeding precautions.  3/19/2024 0751 by Jaun Servin RN  Outcome: Progressing  3/19/2024 0745 by Jaun Servin RN  Outcome: Progressing     Problem: Activity Intolerance/Impaired Mobility  Goal: Mobility/activity is  maintained at optimum level for patient  Description: Interventions:  - Assess and monitor patient  barriers to mobility and need for assistive/adaptive devices.  - Assess patient's emotional response to limitations.  - Collaborate with interdisciplinary team and initiate plans and interventions as ordered.  - Encourage independent activity per ability.  - Maintain proper body alignment.  - Perform active/passive rom as tolerated/ordered.  - Plan activities to conserve energy.  - Turn patient as appropriate  3/19/2024 0751 by Jaun Servin RN  Outcome: Progressing  3/19/2024 0745 by Jaun Servin RN  Outcome: Progressing     Problem: Communication Impairment  Goal: Ability to express needs and understand communication  Description: Assess patient's communication skills and ability to understand information.  Patient will demonstrate use of effective communication techniques, alternative methods of communication and understanding even if not able to speak.     - Encourage communication and provide alternate methods of communication as needed.  - Collaborate with case management/ for discharge needs.  - Include patient/family/caregiver in decisions related to communication.  3/19/2024 0751 by Jaun Servin RN  Outcome: Progressing  3/19/2024 0745 by Jaun Servin RN  Outcome: Progressing     Problem: Potential for Aspiration  Goal: Non-ventilated patient's risk of aspiration is minimized  Description: Assess and monitor vital signs, respiratory status, and labs (WBC).  Monitor for signs of aspiration (tachypnea, cough, rales, wheezing, cyanosis, fever).    - Assess and monitor patient's ability to swallow.  - Place patient up in chair to eat if possible.  - HOB up at 90 degrees to eat if unable to get patient up into chair.  - Supervise patient during oral intake.   - Instruct patient/ family to take small bites.  - Instruct patient/ family to take small single sips when taking liquids.  -  Follow patient-specific strategies generated by speech pathologist.  3/19/2024 0751 by Jaun Servin RN  Outcome: Progressing  3/19/2024 0745 by Jaun Servin RN  Outcome: Progressing     Problem: Nutrition  Goal: Nutrition/Hydration status is improving  Description: Monitor and assess patient's nutrition/hydration status for malnutrition (ex- brittle hair, bruises, dry skin, pale skin and conjunctiva, muscle wasting, smooth red tongue, and disorientation). Collaborate with interdisciplinary team and initiate plan and interventions as ordered.  Monitor patient's weight and dietary intake as ordered or per policy. Utilize nutrition screening tool and intervene per policy. Determine patient's food preferences and provide high-protein, high-caloric foods as appropriate.     - Assist patient with eating.  - Allow adequate time for meals.  - Encourage patient to take dietary supplement as ordered.  - Collaborate with clinical nutritionist.  - Include patient/family/caregiver in decisions related to nutrition.  3/19/2024 0751 by Jaun Servin RN  Outcome: Progressing  3/19/2024 0745 by Jaun Servin RN  Outcome: Progressing     Problem: Prexisting or High Potential for Compromised Skin Integrity  Goal: Skin integrity is maintained or improved  Description: INTERVENTIONS:  - Identify patients at risk for skin breakdown  - Assess and monitor skin integrity  - Assess and monitor nutrition and hydration status  - Monitor labs   - Assess for incontinence   - Turn and reposition patient  - Assist with mobility/ambulation  - Relieve pressure over bony prominences  - Avoid friction and shearing  - Provide appropriate hygiene as needed including keeping skin clean and dry  - Evaluate need for skin moisturizer/barrier cream  - Collaborate with interdisciplinary team   - Patient/family teaching  - Consider wound care consult   3/19/2024 0751 by Jaun Servin RN  Outcome: Progressing  3/19/2024 0745 by Jaun Servin  RN  Outcome: Progressing     Problem: Nutrition/Hydration-ADULT  Goal: Nutrient/Hydration intake appropriate for improving, restoring or maintaining nutritional needs  Description: Monitor and assess patient's nutrition/hydration status for malnutrition. Collaborate with interdisciplinary team and initiate plan and interventions as ordered.  Monitor patient's weight and dietary intake as ordered or per policy. Utilize nutrition screening tool and intervene as necessary. Determine patient's food preferences and provide high-protein, high-caloric foods as appropriate.     INTERVENTIONS:  - Monitor oral intake, urinary output, labs, and treatment plans  - Assess nutrition and hydration status and recommend course of action  - Evaluate amount of meals eaten  - Assist patient with eating if necessary   - Allow adequate time for meals  - Recommend/ encourage appropriate diets, oral nutritional supplements, and vitamin/mineral supplements  - Order, calculate, and assess calorie counts as needed  - Recommend, monitor, and adjust tube feedings and TPN/PPN based on assessed needs  - Assess need for intravenous fluids  - Provide specific nutrition/hydration education as appropriate  - Include patient/family/caregiver in decisions related to nutrition  3/19/2024 0751 by Jaun Servin RN  Outcome: Progressing  3/19/2024 0745 by Jaun Servin RN  Outcome: Progressing   - Out of bed for toileting  - Record patient progress and toleration of activity level   Outcome: Progressing     Problem: PAIN - ADULT  Goal: Verbalizes/displays adequate comfort level or baseline comfort level  Description: Interventions:  - Encourage patient to monitor pain and request assistance  - Assess pain using appropriate pain scale  - Administer analgesics based on type and severity of pain and evaluate response  - Implement non-pharmacological measures as appropriate and evaluate response  - Consider cultural and social influences on pain and pain  management  - Notify physician/advanced practitioner if interventions unsuccessful or patient reports new pain  3/19/2024 0751 by Jaun Servin RN  Outcome: Progressing  3/19/2024 0745 by Jaun Servin, RN  Outcome: Progressing

## 2024-03-19 NOTE — ASSESSMENT & PLAN NOTE
Diagnosis: Polycystic Kidney Disease w/positive family history  Patient with bilateral renal cyst. 3/19 Renal US: Left renal artery  60 - 99% stenosis was identified in the tortuous main renal artery. Patent left renal vein. Normal R renal artery/vein.   Plan:   -Nephrology following, have discussed with neurology that goal blood pressure should be normotension  - f/u aldosterone/renin, metanephrine levels  - added lisinopril 5mg to BP regimen

## 2024-03-19 NOTE — ASSESSMENT & PLAN NOTE
Diagnosis: Polycystic Kidney Disease w/positive family history  Patient with bilateral renal cyst. 3/19 Renal US: Left renal artery  60 - 99% stenosis was identified in the tortuous main renal artery. Patent left renal vein. Normal R renal artery/vein.   Plan:   -Nephrology following, have discussed with neurology that goal blood pressure should be normotension especially with concurrent fusiform descending aortic aneurysm, will start NRT to manage risk factors  - f/u aldosterone/renin, metanephrine levels  - added lisinopril 5mg to BP regimen

## 2024-03-19 NOTE — ASSESSMENT & PLAN NOTE
[facility] Medicine Services  INPATIENT PROGRESS NOTE    Length of Stay: 4  Date of Admission: 6/4/2018  Primary Care Physician: Magan Lopez MD    Subjective   Chief Complaint: follow up UTI  HPI   Pt in bed. Daughter at bedside. Pt states she is not having any pain. She awakens but thn goes back to sleep. No vomiting. Actually took her medications of lactulose and megace this am. No chest pain or shortness of breath.    Review of Systems  All pertinent negatives and positives are as above. All other systems have been reviewed and are negative unless otherwise stated.     Objective    Temp:  [96.9 °F (36.1 °C)-98.4 °F (36.9 °C)] 96.9 °F (36.1 °C)  Heart Rate:  [59-68] 62  Resp:  [18-20] 18  BP: (123-161)/(53-74) 157/71  Physical Exam   Constitutional: She appears well-developed and well-nourished.   HENT:   Head: Normocephalic and atraumatic.   Eyes: Conjunctivae and EOM are normal. Pupils are equal, round, and reactive to light.   Neck: Neck supple. No JVD present. No thyromegaly present.   Cardiovascular: Normal rate, regular rhythm, normal heart sounds and intact distal pulses.  Exam reveals no gallop and no friction rub.    No murmur heard.  Sinus bradycardia 56-69 with PVC's PAC's.   Pulmonary/Chest: Effort normal and breath sounds normal. No respiratory distress. She has no wheezes. She has no rales. She exhibits no tenderness.   Abdominal: Soft. Bowel sounds are normal. She exhibits no distension. There is no tenderness. There is no rebound and no guarding.   Musculoskeletal: Normal range of motion. She exhibits no edema, tenderness or deformity.   Lymphadenopathy:     She has no cervical adenopathy.   Neurological: She is alert. She displays normal reflexes. No cranial nerve deficit. She exhibits normal muscle tone.   Asterixis noted.    Skin: Skin is warm and dry. No rash noted.   Psychiatric: She has a normal mood and affect. Her behavior is normal. Judgment and  "See plan above in \"CVA\"  " thought content normal.     Results Review:  I have reviewed the labs, radiology results, and diagnostic studies.    Laboratory Data:     Results from last 7 days  Lab Units 06/08/18  0551 06/07/18  0548 06/06/18  0634   WBC 10*3/mm3 29.55* 26.35* 28.70*   HEMOGLOBIN g/dL 13.0 12.2 12.0   HEMATOCRIT % 41.1 38.1 37.9   PLATELETS 10*3/mm3 554* 561* 538*       Results from last 7 days  Lab Units 06/08/18  0551 06/07/18  0548 06/06/18  0634   SODIUM mmol/L 145 143 144   POTASSIUM mmol/L 4.5 4.3 4.0   CHLORIDE mmol/L 114* 113* 112*   CO2 mmol/L 19.0* 19.0* 22.0*   BUN mg/dL 15 15 19   CREATININE mg/dL 1.04 1.05 1.26   CALCIUM mg/dL 11.0* 10.8* 10.1   BILIRUBIN mg/dL 0.7 0.8 0.7   ALK PHOS U/L 169* 177* 186*   ALT (SGPT) U/L 26 24 23   AST (SGOT) U/L 130* 126* 135*   GLUCOSE mg/dL 120* 149* 118*     Culture Data:   Blood Culture   Date Value Ref Range Status   06/06/2018 No growth at 24 hours  Preliminary   06/04/2018 No growth at 3 days  Preliminary   06/04/2018 Abnormal Stain (A)  Preliminary   06/04/2018 Staphylococcus, coagulase negative (A)  Preliminary     Urine Culture   Date Value Ref Range Status   06/04/2018 (A)  Final    >100,000 CFU/mL Klebsiella pneumoniae ssp pneumoniae       Radiology Data:   Imaging Results (last 24 hours)     ** No results found for the last 24 hours. **          I have reviewed the patient current medications.     Assessment/Plan   Assessment:  1. Left upper lobe patchy infiltrate concerning for pneumonia- healthcare associated. Repeat chest x-ray reveals resolution of the infiltrate  2. Urinary tract infection, urine culture with growth of Klebsiella pneumoniae  3. Acute metabolic encephalopathy- likely secondary to above  4. Acute kidney injury on chronic kidney disease- difficult to ascertain baseline kidney function.  Improved  5. Leukocytosis  6. Recent diagnosis of cholangicarcinoma  7. Clostridium Difficile colitis with recurrence  8. Dementia  9. Insulin dependent Diabetes  Mellitus Type 2, hgb A1c 6.8%  10. Asplenic  11. Thrombocytosis  12. Elevated Ammonia level.  13. Hypercalcemia    Plan:  1. Will add Xifaxin to lactulose to help with ammonia level.  2. Physical therapy consult.   3. ? Zometa.   4. Lisinopril/HCTZ   5. Awaiting disposition.   6. Continue vancomycin  7. Azactam day 4.     Discharge Planning: I expect the patient to be discharged to ? in ? days.    Amanda Lewis, JULI   06/08/18   9:51 AM

## 2024-03-19 NOTE — ASSESSMENT & PLAN NOTE
"See above \"Left PCA occlusion\"      - PT/OT following patient. Recommending post-acute rehab once discharged.  "

## 2024-03-19 NOTE — PLAN OF CARE
Problem: PAIN - ADULT  Goal: Verbalizes/displays adequate comfort level or baseline comfort level  Description: Interventions:  - Encourage patient to monitor pain and request assistance  - Assess pain using appropriate pain scale  - Administer analgesics based on type and severity of pain and evaluate response  - Implement non-pharmacological measures as appropriate and evaluate response  - Consider cultural and social influences on pain and pain management  - Notify physician/advanced practitioner if interventions unsuccessful or patient reports new pain  Outcome: Progressing     Problem: INFECTION - ADULT  Goal: Absence or prevention of progression during hospitalization  Description: INTERVENTIONS:  - Assess and monitor for signs and symptoms of infection  - Monitor lab/diagnostic results  - Monitor all insertion sites, i.e. indwelling lines, tubes, and drains  - Monitor endotracheal if appropriate and nasal secretions for changes in amount and color  - [place] appropriate cooling/warming therapies per order  - Administer medications as ordered  - Instruct and encourage patient and family to use good hand hygiene technique  - Identify and instruct in appropriate isolation precautions for identified infection/condition  Outcome: Progressing  Goal: Absence of fever/infection during neutropenic period  Description: INTERVENTIONS:  - Monitor WBC    Outcome: Progressing     Problem: SAFETY ADULT  Goal: Patient will remain free of falls  Description: INTERVENTIONS:  - Educate patient/family on patient safety including physical limitations  - Instruct patient to call for assistance with activity   - Consult OT/PT to assist with strengthening/mobility   - Keep Call bell within reach  - Keep bed low and locked with side rails adjusted as appropriate  - Keep care items and personal belongings within reach  - Initiate and maintain comfort rounds  - Make Fall Risk Sign visible to staff  - Offer Toileting every 2 Hours,  in advance of need  - Initiate/Maintain Bed alarm  - Apply yellow socks and bracelet for high fall risk patients  - Consider moving patient to room near nurses station  Outcome: Progressing  Goal: Maintain or return to baseline ADL function  Description: INTERVENTIONS:  -  Assess patient's ability to carry out ADLs; assess patient's baseline for ADL function and identify physical deficits which impact ability to perform ADLs (bathing, care of mouth/teeth, toileting, grooming, dressing, etc.)  - Assess/evaluate cause of self-care deficits   - Assess range of motion  - Assess patient's mobility; develop plan if impaired  - Assess patient's need for assistive devices and provide as appropriate  - Encourage maximum independence but intervene and supervise when necessary  - Involve family in performance of ADLs  - Assess for home care needs following discharge   - Consider OT consult to assist with ADL evaluation and planning for discharge  - Provide patient education as appropriate  Outcome: Progressing  Goal: Maintains/Returns to pre admission functional level  Description: INTERVENTIONS:  - Perform AM-PAC 6 Click Basic Mobility/ Daily Activity assessment daily.  - Set and communicate daily mobility goal to care team and patient/family/caregiver.   - Collaborate with rehabilitation services on mobility goals if consulted  - Out of bed for toileting  - Record patient progress and toleration of activity level   Outcome: Progressing     Problem: DISCHARGE PLANNING  Goal: Discharge to home or other facility with appropriate resources  Description: INTERVENTIONS:  - Identify barriers to discharge w/patient and caregiver  - Arrange for needed discharge resources and transportation as appropriate  - Identify discharge learning needs (meds, wound care, etc.)  - Arrange for interpretive services to assist at discharge as needed  - Refer to Case Management Department for coordinating discharge planning if the patient needs  post-hospital services based on physician/advanced practitioner order or complex needs related to functional status, cognitive ability, or social support system  Outcome: Progressing     Problem: Knowledge Deficit  Goal: Patient/family/caregiver demonstrates understanding of disease process, treatment plan, medications, and discharge instructions  Description: Complete learning assessment and assess knowledge base.  Interventions:  - Provide teaching at level of understanding  - Provide teaching via preferred learning methods  Outcome: Progressing     Problem: Neurological Deficit  Goal: Neurological status is stable or improving  Description: Interventions:  - Monitor and assess patient's level of consciousness, motor function, sensory function, and level of assistance needed for ADLs.   - Monitor and report changes from baseline. Collaborate with interdisciplinary team to initiate plan and implement interventions as ordered.   - Provide and maintain a safe environment.  - Consider seizure precautions.  - Consider fall precautions.  - Consider aspiration precautions.  - Consider bleeding precautions.  Outcome: Progressing     Problem: Activity Intolerance/Impaired Mobility  Goal: Mobility/activity is maintained at optimum level for patient  Description: Interventions:  - Assess and monitor patient  barriers to mobility and need for assistive/adaptive devices.  - Assess patient's emotional response to limitations.  - Collaborate with interdisciplinary team and initiate plans and interventions as ordered.  - Encourage independent activity per ability.  - Maintain proper body alignment.  - Perform active/passive rom as tolerated/ordered.  - Plan activities to conserve energy.  - Turn patient as appropriate  Outcome: Progressing     Problem: Communication Impairment  Goal: Ability to express needs and understand communication  Description: Assess patient's communication skills and ability to understand information.   Patient will demonstrate use of effective communication techniques, alternative methods of communication and understanding even if not able to speak.     - Encourage communication and provide alternate methods of communication as needed.  - Collaborate with case management/ for discharge needs.  - Include patient/family/caregiver in decisions related to communication.  Outcome: Progressing     Problem: Potential for Aspiration  Goal: Non-ventilated patient's risk of aspiration is minimized  Description: Assess and monitor vital signs, respiratory status, and labs (WBC).  Monitor for signs of aspiration (tachypnea, cough, rales, wheezing, cyanosis, fever).    - Assess and monitor patient's ability to swallow.  - Place patient up in chair to eat if possible.  - HOB up at 90 degrees to eat if unable to get patient up into chair.  - Supervise patient during oral intake.   - Instruct patient/ family to take small bites.  - Instruct patient/ family to take small single sips when taking liquids.  - Follow patient-specific strategies generated by speech pathologist.  Outcome: Progressing  Goal: Ventilated patient's risk of aspiration is minimized  Description: Assess and monitor vital signs, respiratory status, airway cuff pressure, and labs (WBC).  Monitor for signs of aspiration (tachypnea, cough, rales, wheezing, cyanosis, fever).    - Elevate head of bed 30 degrees if patient has tube feeding.  - Monitor tube feeding.  Outcome: Progressing     Problem: Nutrition  Goal: Nutrition/Hydration status is improving  Description: Monitor and assess patient's nutrition/hydration status for malnutrition (ex- brittle hair, bruises, dry skin, pale skin and conjunctiva, muscle wasting, smooth red tongue, and disorientation). Collaborate with interdisciplinary team and initiate plan and interventions as ordered.  Monitor patient's weight and dietary intake as ordered or per policy. Utilize nutrition screening tool and  intervene per policy. Determine patient's food preferences and provide high-protein, high-caloric foods as appropriate.     - Assist patient with eating.  - Allow adequate time for meals.  - Encourage patient to take dietary supplement as ordered.  - Collaborate with clinical nutritionist.  - Include patient/family/caregiver in decisions related to nutrition.  Outcome: Progressing     Problem: Prexisting or High Potential for Compromised Skin Integrity  Goal: Skin integrity is maintained or improved  Description: INTERVENTIONS:  - Identify patients at risk for skin breakdown  - Assess and monitor skin integrity  - Assess and monitor nutrition and hydration status  - Monitor labs   - Assess for incontinence   - Turn and reposition patient  - Assist with mobility/ambulation  - Relieve pressure over bony prominences  - Avoid friction and shearing  - Provide appropriate hygiene as needed including keeping skin clean and dry  - Evaluate need for skin moisturizer/barrier cream  - Collaborate with interdisciplinary team   - Patient/family teaching  - Consider wound care consult   Outcome: Progressing     Problem: Nutrition/Hydration-ADULT  Goal: Nutrient/Hydration intake appropriate for improving, restoring or maintaining nutritional needs  Description: Monitor and assess patient's nutrition/hydration status for malnutrition. Collaborate with interdisciplinary team and initiate plan and interventions as ordered.  Monitor patient's weight and dietary intake as ordered or per policy. Utilize nutrition screening tool and intervene as necessary. Determine patient's food preferences and provide high-protein, high-caloric foods as appropriate.     INTERVENTIONS:  - Monitor oral intake, urinary output, labs, and treatment plans  - Assess nutrition and hydration status and recommend course of action  - Evaluate amount of meals eaten  - Assist patient with eating if necessary   - Allow adequate time for meals  - Recommend/  encourage appropriate diets, oral nutritional supplements, and vitamin/mineral supplements  - Order, calculate, and assess calorie counts as needed  - Recommend, monitor, and adjust tube feedings and TPN/PPN based on assessed needs  - Assess need for intravenous fluids  - Provide specific nutrition/hydration education as appropriate  - Include patient/family/caregiver in decisions related to nutrition  Outcome: Progressing

## 2024-03-19 NOTE — PROGRESS NOTES
Patient assessed on rounds today.  Overall doing well.  Continue with nicardipine for systolic blood pressure control, goal 161 and 80 mmHg.  Starting Norvasc and carvedilol.  Adjust as needed.  Methadone dosing confirmed.  Continue with supportive care.    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

## 2024-03-19 NOTE — ASSESSMENT & PLAN NOTE
Diagnosis: Left PCA occlusion   Patient presented with left-sided weakness/numbness, unsteady gait, 10/10 headache, and transient right-sided vision loss (resolved within minutes). /119 on arrival.  NIHSS 5 (left facial droop, LUE/LLE drift, LUE/LLE sensory loss, mild/moderate dysarthria). CT head showed chronic right basal ganglia lacunar infarct.  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  Patient was loaded with aspirin 324 mg once, started on Cardene drip, and transferred to [facility] for possible neurosurgery intervention.  Plan:  MRI brain pending  Pending autoimmune/inflammatory labs: sjogrens ab, thrombosis panel, ACE, C50, RF, ANCA, NISHA  Migraine cocktail as needed (AVOID using NSAIDS, Triptans, steroids and DHE)   Continue ASA 81mg and Lipitor 40mg daily  TTE - pending  Coreg 12.5mg BID, lisinopril 5mg, hydralazine 5mg PRN, and labetalol PRN currently ordered for BP control.  Systolic blood pressure goal 120-140  Neurology following

## 2024-03-19 NOTE — H&P
"    INTERNAL MEDICINE RESIDENCY TRANSFER ACCEPTANCE NOTE     Name: DEANNA Ashraf   Age & Sex: 33 y.o. male   MRN: [id number]  Unit/Bed#: [facility] 719-01   Encounter: [id number]  Hospital Stay Days: 1    Accepting team: SOD Team B   Code Status: Level 1 - Full Code  Disposition: Patient requires Level 2 Step Down     ASSESSMENT/PLAN     Principal Problem:    Cerebrovascular accident (CVA) due to occlusion of left posterior cerebral artery (HCC)  Active Problems:    Hypertensive emergency    Polycystic kidney disease    Methadone use    Left-sided weakness    Aneurysm of anterior communicating artery    Stroke-like symptoms      * Cerebrovascular accident (CVA) due to occlusion of left posterior cerebral artery (HCC)  Assessment & Plan  Diagnosis: Left PCA occlusion   Patient presented with left-sided weakness/numbness, unsteady gait, 10/10 headache, and transient right-sided vision loss (resolved within minutes). /119 on arrival.  NIHSS 5 (left facial droop, LUE/LLE drift, LUE/LLE sensory loss, mild/moderate dysarthria). CT head showed chronic right basal ganglia lacunar infarct.  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  Patient was loaded with aspirin 324 mg once, started on Cardene drip, and transferred to [facility] for possible neurosurgery intervention.  Plan:  MRI brain ordered  Pending autoimmune/inflammatory labs: sjogrens ab, thrombosis panel, ACE, C50, RF, ANCA, NISHA  Migraine cocktail as needed (AVOID using NSAIDS, Triptans, steroids and DHE)   Continue ASA 81mg and Lipitor 40mg daily  TTE - pending  Coreg 12.5mg BID, lisinopril 5mg, hydralazine 5mg PRN, and labetalol PRN currently ordered for BP control.  Systolic blood pressure goal 120-140 \"normotensive\"  Neurology following     Polycystic kidney " "disease  Assessment & Plan  Diagnosis: Polycystic Kidney Disease w/positive family history  Patient with bilateral renal cyst. 3/19 Renal US: Left renal artery  60 - 99% stenosis was identified in the tortuous main renal artery. Patent left renal vein. Normal R renal artery/vein.   Plan:   -Nephrology following, have discussed with neurology that goal blood pressure should be normotension  - f/u aldosterone/renin, metanephrine levels  - added lisinopril 5mg to BP regimen    Hypertensive emergency  Assessment & Plan  See plan above in \"CVA\"     Methadone use  Assessment & Plan  Diagnosis: Substance use disorder  Home medication: methadone. [facility]adone clinic: [facility] in [place], [place]; confirmed dose with them. Pt denies illicit drug use. UDS on 3/17 +methadone only.  Plan:  Monitor for s/s of withdrawal  105 morning dose (oral solution), 120 night dose (tablets)    Aneurysm of anterior communicating artery  Assessment & Plan  See plan above in \"CVA\"    Left-sided weakness  Assessment & Plan  See above \"Left PCA occlusion\"      - PT/OT following patient. Recommending post-acute rehab once discharged.        VTE Pharmacologic Prophylaxis: Enoxaparin (Lovenox)  VTE Mechanical Prophylaxis: sequential compression device    HOSPITAL COURSE     33-year-old male past medical history opioid use on methadone chronically, HTN, family history of stroke presented to [facility] 3/17 as stroke alert after having sudden loss of vision in right eye early in morning of admission that resolved within minutes minutes with progression of weakness/slurred speech/mild left facial droop.  CT stroke alert showed chronic lacunar infarct of right basal ganglia and and possible subacute ivelisse-infarct ischemia. CTA showed occluded left PCA, atherosclerotic left m2 branch, and 2mm acom aneurysm. Pt not offered TNK as outside of window for intervension. Prior to ICU transfer became hypertensive and dizzy. Pt treated " for htn urgency with SBP 250s/120s and placed on cardene infusion and loaded with 325mg asa. Pt transferred to [facility] ICU for possible thrombectomy by NSEDUARDO. Ultimately thrombectomy did not take place.     Patient was weaned from Cardene drip 3/19 on the day of transfer. Started on amlodipine 10 mg daily, lisinopril 5 mg daily, Coreg 12.5 mg twice daily and given as needed hydralazine's 5 mg and labetalol 10 mg every 4 as needed with Nephrology recommendations to work-up secondary causes of htn.       SUBJECTIVE     Patient sitting in bed with multiple family members surrounding patient.  Patient states that on Sunday afternoon he lost his vision in his right eye for a few minutes and returned.  On the morning of 3/18 patient states headache worsened with the reported strokelike symptoms.  Patient confirms he does not follow-up with PCP outpatient and never checks his blood pressure at home.  Endorses 18-pack-year history of smoking (now reduced to a few cigarettes per day past 2 years) as well as significant [employer] energy drink consumption (approximately 25 cans in a 2-day period).  Family states that patient has been complaining of headaches for the past several months and patient states that in the past week headaches worsened to global head pounding/worst headache of life.  Also complains of not defecating for 1 week.  States no nausea vomiting fevers chills cough.    OBJECTIVE     Vitals:    03/19/24 1330 03/19/24 1430 03/19/24 1445 03/19/24 1600   BP: 163/94 (!) 205/100 (!) 194/95 (!) 184/95   BP Location:       Pulse: 74 75 75 76   Resp: 18 18 18   Temp:       TempSrc:       SpO2:       Weight:       Height:         I/O last 24 hours:  In: 1491.3 [P.O.:720; I.V.:771.3]  Out: 750 [Urine:750]    Physical Exam  Vitals ( in room) reviewed.   Constitutional:       General: He is not in acute distress.     Appearance: He is not ill-appearing, toxic-appearing or diaphoretic.   HENT:      Mouth/Throat:       Mouth: Mucous membranes are moist.      Pharynx: Oropharynx is clear.   Cardiovascular:      Rate and Rhythm: Normal rate and regular rhythm.      Heart sounds: No murmur heard.  Pulmonary:      Effort: Pulmonary effort is normal.      Breath sounds: Normal breath sounds. No wheezing or rales.   Abdominal:      General: There is distension.      Palpations: There is no mass.      Tenderness: There is no abdominal tenderness.   Musculoskeletal:      Right lower leg: No edema.      Left lower leg: No edema.   Skin:     General: Skin is warm and dry.   Neurological:      Mental Status: He is alert and oriented to person, place, and time. Mental status is at baseline.      Cranial Nerves: Cranial nerve deficit present.      Comments: Left sided facial weakness. Mild slurring of speech    Psychiatric:         Mood and Affect: Mood normal.         Behavior: Behavior normal.         Thought Content: Thought content normal.         Judgment: Judgment normal.       LABORATORY DATA     Labs: I have personally reviewed pertinent reports.    Results from last 7 days   Lab Units 03/19/24  0509 03/18/24  0501 03/17/24  1226   WBC Thousand/uL 7.25 7.09 7.01   HEMOGLOBIN g/dL 12.7 13.8 12.9   HEMATOCRIT % 38.7 41.6 38.7   PLATELETS Thousands/uL 169 171 150   NEUTROS PCT % 49 76*  --    MONOS PCT % 8 6  --    EOS PCT % 2 0  --       Results from last 7 days   Lab Units 03/19/24  1417 03/19/24  0509 03/18/24  0501 03/17/24  1226   POTASSIUM mmol/L 3.8 3.4* 3.5 3.7   CHLORIDE mmol/L 104 104 100 101   CO2 mmol/L 26 26 25 28   BUN mg/dL 19 19 18 17   CREATININE mg/dL 0.88 0.90 1.07 1.08   CALCIUM mg/dL 9.5 9.0 9.2 9.0   ALK PHOS U/L  --  58 66 54   ALT U/L  --  16 17 15   AST U/L  --  17 17 13     Results from last 7 days   Lab Units 03/19/24  0509 03/18/24  0501   MAGNESIUM mg/dL 2.2 2.1     Results from last 7 days   Lab Units 03/19/24  1417 03/19/24  0509 03/18/24  0501   PHOSPHORUS mg/dL 3.0 2.3* 3.7      Results from last 7 days  "  Lab Units 03/17/24  1226   INR  1.10   PTT seconds 21*     Results from last 7 days   Lab Units 03/17/24  1557   LACTIC ACID mmol/L 1.3         Micro:  No results found for: \"BLOODCX\", \"URINECX\", \"WOUNDCULT\", \"SPUTUMCULTUR\"  IMAGING & DIAGNOSTIC TESTING     Imaging: I have personally reviewed pertinent reports.    CTA dissection protocol chest/abdomen/pelvis    Result Date: 3/18/2024  Impression: 5.0 cm fusiform aneurysm of the descending aorta at the level of the hiatus. No evidence of dissection. Consider vascular surgery referral. Multiple small bilateral renal cysts. The study was marked in EPIC for immediate notification. Workstation performed: [id number]     EKG, Pathology, and Other Studies: I have personally reviewed pertinent reports.     ALLERGIES     Allergies   Allergen Reactions    Haloperidol Hives and Other (See Comments)     Other reaction(s): Extrapyramidal Symptoms     MEDICATIONS     Current Facility-Administered Medications   Medication Dose Route Frequency Provider Last Rate    acetaminophen  650 mg Oral Q6H PRN Mel Pressley MD      amLODIPine  10 mg Oral Daily Mel Pressley MD      aspirin  81 mg Oral Daily Mel Pressley MD      atorvastatin  40 mg Oral QPM Mel Pressley MD      carvedilol  12.5 mg Oral BID With Meals Mel Pressley MD      chlorhexidine  15 mL Mouth/Throat Q12H [facility] Mel Pressley MD      enoxaparin  40 mg Subcutaneous Daily Mel Pressley MD      hydrALAZINE  5 mg Intravenous Q4H PRN Mel Pressley MD      hydrALAZINE  5 mg Intravenous Once Kassie Copeland DO      labetalol  10 mg Intravenous Q4H PRN ALEN Mills      lisinopril  5 mg Oral Daily Kassie Copeland DO      methadone  105 mg Oral Daily ALEN Mills      methadone  120 mg Oral QPM Mel Pressley MD      polyethylene glycol  17 g Oral Daily PRN Kassie Copeland DO      senna-docusate sodium  1 tablet Oral BID PRN Kassie Copeland, DO          acetaminophen, 650 mg, Q6H PRN  hydrALAZINE, 5 mg, Q4H PRN  labetalol, 10 " "mg, Q4H PRN  polyethylene glycol, 17 g, Daily PRN  senna-docusate sodium, 1 tablet, BID PRN        Portions of the record may have been created with voice recognition software.  Occasional wrong word or \"sound a like\" substitutions may have occurred due to the inherent limitations of voice recognition software.  Read the chart carefully and recognize, using context, where substitutions have occurred.    ==  Kassie Copeland DO  [facility]  Internal Medicine Residency PGY-1  03/19/24 4:17 PM    "

## 2024-03-19 NOTE — ASSESSMENT & PLAN NOTE
Diagnosis: Substance use disorder  Home medication: methadone. [facility]adone clinic: [facility] in [place], [place]; confirmed dose with them. Pt denies illicit drug use. UDS on 3/17 +methadone only.  Plan:  Monitor for s/s of withdrawal  105 morning dose (oral solution), 120 night dose (tablets)

## 2024-03-19 NOTE — UTILIZATION REVIEW
NOTIFICATION OF ADMISSION DISCHARGE   This is a Notification of Discharge from [facility]. Please be advised that this patient has been discharge from our facility. Below you will find the admission and discharge date and time including the patient’s disposition.   UTILIZATION REVIEW CONTACT:  Sanjana Chung  Utilization   Network Utilization Review Department  Phone: [phone] x carefully listen to the prompts. All voicemails are confidential.  Email: [email or web address]     ADMISSION INFORMATION  PRESENTATION DATE: 3/17/2024 12:08 PM  OBERVATION ADMISSION DATE:   INPATIENT ADMISSION DATE: 3/17/24  1:23 PM   DISCHARGE DATE: 3/18/2024  2:35 AM   DISPOSITION:[facility] Utilization Review Department  ATTENTION: Please call with any questions or concerns to [phone] and carefully listen to the prompts so that you are directed to the right person. All voicemails are confidential.   For Discharge needs, contact Care Management DC Support Team at [phone] opt. 2  Send all requests for admission clinical reviews, approved or denied determinations and any other requests to dedicated fax number below belonging to the campus where the patient is receiving treatment. List of dedicated fax numbers for the Facilities:  FACILITY NAME UR FAX NUMBER   ADMISSION DENIALS (Administrative/Medical Necessity) [phone]   DISCHARGE SUPPORT TEAM ([facility]) [phone]   PARENT CHILD HEALTH (Maternity/NICU/Pediatrics) [phone]   [facility] [phone]   [facility] [phone]   [facility] [phone]   [facility] [phone]   [facility] [phone]   [facility] [phone]   [facility] [phone]   [facility]  [place] [phone]   [facility] [phone]   [facility] [phone]   [facility] [phone]   [facility] [phone]

## 2024-03-19 NOTE — CONSULTS
"Consultation - Nephrology   DEANNA Ashraf 33 y.o. male MRN: [id number]  Unit/Bed#: [facility] 719-01 Encounter: [id number]      Assessment/Plan:  Polycystic kidney disease with a positive family history (mother), bilateral renal cysts and noted cerebral aneurysm.  Hypertensive emergency with CVA, noted chronic right basilar ganglia lacunar infarct with occluded left PCA with noted distal reconstitution and moderate atherosclerotic disease, awaiting official MRI of the brain with and without contrast.  Cerebral aneurysm measuring 2 mm of the anterior communicating artery  Suspicion for possible left renal artery stenosis with noted velocities at 528 seen on renal Doppler although previous CTA without evidence of flow-limiting atherosclerotic stenosis of the aorta or major aortic branch vessel in the chest abdomen pelvis.  Atherosclerotic disease, with noted aortic aneurysm, management as per vascular surgery.  Hypokalemia, continue with replacement as needed.  For completeness sake will check renin, aldosterone as well as plasma metanephrines.    Plan:  Patient still with significant blood pressure variability, systolic blood pressures appear to be between 140 and as high as 205.  Discussed with neurology, okay to continue to adjust medications for blood pressure management i.e. goal for \"normotension\"  Recommend adding lisinopril starting at 5 mg daily.  Continue with carvedilol and amlodipine.  Hold parameters.  Given hypertensive urgency would recommend checking aldosterone, renin as well as plasma metanephrines.      History of Present Illness   Physician Requesting Consult: Bienvenido Cortez MD  Reason for Consult / Principal Problem: Uncontrolled hypertension, polycystic kidney disease.  HPI: DEANNA Ashraf is a 33 y.o. year old male who presents with weakness.    33-year-old male with known history of uncontrolled hypertension and prior heroin abuse on chronic methadone who presents to [facility] with complaints of " left-sided weakness and unsteady gait as well as transient right-sided vision loss.  Initial blood pressure on presentation was 254/119 with noted left facial droop, left upper extremity weakness and drift.  Also noted dysarthria.  Further evaluation showed chronic right basal ganglia infarction with left PCA proximal occlusion and distal reconstitution with atherosclerotic narrowing of the M2 branch.  Also noted to have 2 mm anterior cerebral aneurysm.    Family medical history: Mother/Brother : polycystic kidney disease     History obtained from mother, chart review, and the patient    Review of Systems   Constitutional:  Positive for fatigue.   Respiratory:  Positive for shortness of breath. Negative for chest tightness.    Cardiovascular:  Negative for chest pain and leg swelling.   Gastrointestinal:  Negative for abdominal pain, diarrhea, nausea and vomiting.   Genitourinary:  Negative for difficulty urinating and hematuria.   Neurological:  Positive for speech difficulty, weakness, numbness and headaches.       Pertinent findings of a 10 point review of systems noted above otherwise all others negative    Historical Information   Patient Active Problem List   Diagnosis    Cerebrovascular accident (CVA) due to occlusion of left posterior cerebral artery (HCC)    Left-sided weakness    Aneurysm of anterior communicating artery    Methadone use    Hypertensive emergency    Impaired mobility and activities of daily living    At risk for altered urinary elimination    At risk for constipation    At high risk for venous thromboembolism (VTE)    At high risk for skin breakdown    Stroke-like symptoms     Past Medical History:   Diagnosis Date    Hypertension      No past surgical history on file.  Social History   Social History     Substance and Sexual Activity   Alcohol Use Not Currently     Social History     Substance and Sexual Activity   Drug Use Not Currently     Social History     Tobacco Use   Smoking Status  "Every Day    Current packs/day: 0.25    Average packs/day: 0.3 packs/day for 20.0 years (5.0 ttl pk-yrs)    Types: Cigarettes   Smokeless Tobacco Never     No family history on file.    Meds/Allergies   current meds:   Current Facility-Administered Medications   Medication Dose Route Frequency    acetaminophen (TYLENOL) tablet 650 mg  650 mg Oral Q6H PRN    amLODIPine (NORVASC) tablet 10 mg  10 mg Oral Daily    aspirin chewable tablet 81 mg  81 mg Oral Daily    atorvastatin (LIPITOR) tablet 40 mg  40 mg Oral QPM    carvedilol (COREG) tablet 12.5 mg  12.5 mg Oral BID With Meals    chlorhexidine (PERIDEX) 0.12 % oral rinse 15 mL  15 mL Mouth/Throat Q12H ARYA    enoxaparin (LOVENOX) subcutaneous injection 40 mg  40 mg Subcutaneous Daily    hydrALAZINE (APRESOLINE) injection 5 mg  5 mg Intravenous Q4H PRN    labetalol (NORMODYNE) injection 10 mg  10 mg Intravenous Q4H PRN    [START ON 3/20/2024] lisinopril (ZESTRIL) tablet 5 mg  5 mg Oral Daily    methadone (DOLOPHINE) oral concentrated solution 105 mg  105 mg Oral Daily    methadone (DOLOPHINE) tablet 120 mg  120 mg Oral QPM    polyethylene glycol (MIRALAX) packet 17 g  17 g Oral Daily       Allergies   Allergen Reactions    Haloperidol Hives and Other (See Comments)     Other reaction(s): Extrapyramidal Symptoms         Objective   BP (!) 194/95 Comment: PRN labetalol given  Pulse 75   Temp 98.1 °F (36.7 °C)   Resp 18   Ht 5' 9\" (1.753 m)   Wt 82.8 kg (182 lb 9.6 oz)   SpO2 98%   BMI 26.97 kg/m²     Intake/Output Summary (Last 24 hours) at 3/19/2024 1512  Last data filed at 3/19/2024 1200  Gross per 24 hour   Intake 0 ml   Output 250 ml   Net -250 ml       Current Weight: Weight - Scale: 82.8 kg (182 lb 9.6 oz)    Physical Exam  Constitutional:       Appearance: He is not ill-appearing.   Eyes:      General: No scleral icterus.  Cardiovascular:      Rate and Rhythm: Normal rate and regular rhythm.   Pulmonary:      Effort: Pulmonary effort is normal.      Breath " sounds: Normal breath sounds.   Abdominal:      General: There is no distension.      Palpations: Abdomen is soft.      Tenderness: There is no abdominal tenderness.   Musculoskeletal:      Right lower leg: No edema.      Left lower leg: No edema.   Lymphadenopathy:      Cervical: No cervical adenopathy.   Skin:     General: Skin is warm and dry.      Coloration: Skin is not jaundiced or pale.      Findings: No rash.   Neurological:      Mental Status: He is alert and oriented to person, place, and time.           Lab Results:    Results from last 7 days   Lab Units 03/19/24  0509   WBC Thousand/uL 7.25   HEMOGLOBIN g/dL 12.7   HEMATOCRIT % 38.7   PLATELETS Thousands/uL 169     Results from last 7 days   Lab Units 03/19/24  1417   POTASSIUM mmol/L 3.8   CHLORIDE mmol/L 104   CO2 mmol/L 26   BUN mg/dL 19   CREATININE mg/dL 0.88   CALCIUM mg/dL 9.5

## 2024-03-19 NOTE — UTILIZATION REVIEW
Initial Clinical Review    TRANSFER FROM [facility]   ICU    Admission: Date/Time/Statement:   Admission Orders (From admission, onward)       Ordered        03/18/24 0346  Inpatient Admission  Once                          Orders Placed This Encounter   Procedures    Inpatient Admission     Standing Status:   Standing     Number of Occurrences:   1     Order Specific Question:   Level of Care     Answer:   Critical Care [15]     Order Specific Question:   Estimated length of stay     Answer:   More than 2 Midnights     Order Specific Question:   Certification     Answer:   I certify that inpatient services are medically necessary for this patient for a duration of greater than two midnights. See H&P and MD Progress Notes for additional information about the patient's course of treatment.       Initial Presentation: 33 y.o. male  initially  admitted to [facility] on   3/17  to ICU with a  CVA. Transferred to [facility] for possible  surgical intervention.   PMH is  HTN, med noncompliance, substance abuse on methadone.  Admit  Ip  ICU LOC  with CVA,  hypertensive urgency and plan is  neuro checks,  dysphagia eval, neuro/neuro surgery consults, cardene drip,  monitor  BP, renal U/S,   and    PT/OT  when appropriate.      Neurology consult  Uncontrolled hypertension,  prior heroin use, on methadone.    No additional  neuro testing  at present.  Remain on neuro checks.  Monitor on tele.  Wait  MRI  brain.   Monitor  BP closely.  Unclear etiology  for acute ischemic stroke.      Date:   3/19      Day 2:   Wean  cardizem drip.     Was weaned  3/18  but placed back on  D/T  hypertension.  Complains of headache.  Not  receiving  PRN  Meds.  Plan to increase  PRN   BP meds.   Wait  MRI brain. TTE pending.    Remain on neuro checks/tele.       Additional Vital Signs:   3/18/24 2111 -- 83 -- 136/61 88 -- -- --   03/18/24 2034 -- 75 -- 156/70 101 -- -- --   03/18/24 2007 -- 79 -- 164/72 109 -- -- --   03/18/24 1930 -- 82 --  182/85 Abnormal  -- -- -- --   03/18/24 1830 -- 91 -- 193/88 Abnormal  126 -- -- --   03/18/24 1815 -- 103 -- 199/86 Abnormal  132 -- -- --   03/18/24 1800 -- 90 -- 214/102 Abnormal  146 -- -- --   03/18/24 1730 -- 88 -- 195/30 Abnormal  129 -- -- --   03/18/24 1700 -- 94 -- 184/91 Abnormal  130 -- -- --   03/18/24 1630 -- 76 -- 164/71 109 -- -- --   03/18/24 1600 -- 79 -- 168/73 112 -- -- --   03/18/24 1530 -- 84 84 Abnormal  172/78 Abnormal  115 -- -- --   03/18/24 1515 -- 83 14 204/94 Abnormal  135 -- -- --   03/18/24 1501 98.1 °F (36.7 °C) 104 17 193/91 Abnormal  -- 99 % None (Room air) --   03/18/24 1500 -- 107 Abnormal  16 195/105 Abnormal  136 -- -- --   03/18/24 1430 -- 82 14 157/74 107 -- -- --   03/18/24 1415 -- 78 21 144/66 95 -- -- --   03/18/24 1400 -- 80 16 143/64 92 -- -- --   03/18/24 1345 -- 79 12 135/60 86 -- -- --   03/18/24 1330 -- 81 12 132/61 88 -- -- --   03/18/24 1315 -- 84 10 Abnormal  147/65 84 -- -- --   03/18/24 1300 -- 89 19 161/75 108 -- -- --   03/18/24 1245 -- 88 16 157/69 99 -- -- --   03/18/24 1230 -- 105 15 181/84 Abnormal  120 -- -- --   03/18/24 1110 -- 102 19 189/90 Abnormal  135 98 % -- --   03/18/24 1100 -- 98 22 185/79 Abnormal  111 96 % -- --   03/18/24 1020 -- 102 23 Abnormal  187/88 Abnormal  -- 97 % -- --   03/18/24 1000 -- 102 24 Abnormal  187/88 Abnormal  112 98 % -- --   03/18/24 0900 -- 126 Abnormal  29 Abnormal  163/79 106 97 % -- --   03/18/24 0800 -- 96 16 164/77 105 96 % -- --   03/18/24 0640 -- 94 16 176/79 Abnormal  106 96 % None (Room air) Lying   03/18/24 0630 -- 94 17 189/86 Abnormal  129 96 % None (Room air) Lying   03/18/24 0615 -- 94 13 161/73 104 96 % None (Room air) Lying     Pertinent Labs/Diagnostic Test Results:   VAS renal artery complete   Final Result by Ivan Guardado DO (03/18 1261)      CTA dissection protocol chest/abdomen/pelvis   Final Result by Jason Bales MD (03/18 7753)      5.0 cm fusiform aneurysm of the descending aorta at  the level of the hiatus. No evidence of dissection. Consider vascular surgery referral.      Multiple small bilateral renal cysts.         The study was marked in EPIC for immediate notification.                  Workstation performed: [id number]         MRI brain w wo contrast    (Results Pending)     Results from last 7 days   Lab Units 03/17/24  1226   SARS-COV-2  Negative     Results from last 7 days   Lab Units 03/19/24  0509 03/18/24  0501 03/17/24  1226   WBC Thousand/uL 7.25 7.09 7.01   HEMOGLOBIN g/dL 12.7 13.8 12.9   HEMATOCRIT % 38.7 41.6 38.7   PLATELETS Thousands/uL 169 171 150   NEUTROS ABS Thousands/µL 3.54 5.35  --          Results from last 7 days   Lab Units 03/19/24  0509 03/18/24  0501 03/17/24  1226   SODIUM mmol/L 139 137 134*   POTASSIUM mmol/L 3.4* 3.5 3.7   CHLORIDE mmol/L 104 100 101   CO2 mmol/L 26 25 28   ANION GAP mmol/L 9 12 5   BUN mg/dL 19 18 17   CREATININE mg/dL 0.90 1.07 1.08   EGFR ml/min/1.73sq m 110 89 88   CALCIUM mg/dL 9.0 9.2 9.0   CALCIUM, IONIZED mmol/L  --  1.16  --    MAGNESIUM mg/dL 2.2 2.1  --    PHOSPHORUS mg/dL 2.3* 3.7  --      Results from last 7 days   Lab Units 03/19/24  0509 03/18/24  0501 03/17/24  1226   AST U/L 17 17 13   ALT U/L 16 17 15   ALK PHOS U/L 58 66 54   TOTAL PROTEIN g/dL 6.3* 6.6 5.8*   ALBUMIN g/dL 4.0 4.2 3.7   TOTAL BILIRUBIN mg/dL 0.51 0.60 0.55     Results from last 7 days   Lab Units 03/17/24  1211   POC GLUCOSE mg/dl 116     Results from last 7 days   Lab Units 03/19/24  0509 03/18/24  0501 03/17/24  1226   GLUCOSE RANDOM mg/dL 94 112 120         Results from last 7 days   Lab Units 03/18/24  0501   HEMOGLOBIN A1C % 5.4   EAG mg/dl 108       Results from last 7 days   Lab Units 03/17/24  1557   CK TOTAL U/L 69     Results from last 7 days   Lab Units 03/17/24 2035 03/17/24  1815 03/17/24  1649 03/17/24  1557 03/17/24  1509 03/17/24  1226   HS TNI 0HR ng/L  --   --   --  74*  --  13   HS TNI 2HR ng/L  --  301*  --   --  29  --    HSTNI D2  ng/L  --  227*  --   --  16  --    HS TNI 4HR ng/L 349*  --  155*  --   --   --    HSTNI D4 ng/L 275*  --  81*  --   --   --          Results from last 7 days   Lab Units 03/17/24  1226   PROTIME seconds 14.6*   INR  1.10   PTT seconds 21*     Results from last 7 days   Lab Units 03/17/24  1557   TSH 3RD GENERATON uIU/mL 0.831         Results from last 7 days   Lab Units 03/17/24  1557   LACTIC ACID mmol/L 1.3             Results from last 7 days   Lab Units 03/17/24  1435   BNP pg/mL 98                         Results from last 7 days   Lab Units 03/17/24  1557 03/17/24  1435   CRP mg/L 5.9* 4.7*   SED RATE mm/hour  --  7             Results from last 7 days   Lab Units 03/17/24  1557   CLARITY UA  Clear   COLOR UA  Yellow   SPEC GRAV UA  1.010   PH UA  8.5*   GLUCOSE UA mg/dl Negative   KETONES UA mg/dl Negative   BLOOD UA  Negative   PROTEIN UA mg/dl Negative   NITRITE UA  Negative   BILIRUBIN UA  Negative   UROBILINOGEN UA E.U./dl 0.2   LEUKOCYTES UA  Negative     Results from last 7 days   Lab Units 03/17/24  1226   INFLUENZA A PCR  Negative   INFLUENZA B PCR  Negative   RSV PCR  Negative         Results from last 7 days   Lab Units 03/17/24  1557   AMPH/METH  Negative   BARBITURATE UR  Negative   BENZODIAZEPINE UR  Negative   COCAINE UR  Negative   METHADONE URINE  Positive*   OPIATE UR  Negative   PCP UR  Negative   THC UR  Negative     Results from last 7 days   Lab Units 03/17/24  1557   ETHANOL LVL mg/dL <10               Present on Admission:   Cerebrovascular accident (CVA) due to occlusion of left posterior cerebral artery (HCC)   Hypertensive emergency   Left-sided weakness   Methadone use   Aneurysm of anterior communicating artery   Impaired mobility and activities of daily living   At risk for constipation   At high risk for venous thromboembolism (VTE)   At high risk for skin breakdown      Admitting Diagnosis: Acute left-sided weakness  Age/Sex: 33 y.o. male  Admission Orders:  Scheduled  Medications:  amLODIPine, 10 mg, Oral, Daily  aspirin, 81 mg, Oral, Daily  atorvastatin, 40 mg, Oral, QPM  carvedilol, 6.25 mg, Oral, BID With Meals  chlorhexidine, 15 mL, Mouth/Throat, Q12H ARYA  enoxaparin, 40 mg, Subcutaneous, Daily  methadone, 105 mg, Oral, Daily  methadone, 120 mg, Oral, QPM  potassium phosphate, 21 mmol, Intravenous, Once      Continuous IV Infusions:  Cardene  drip - d/c  3/19     PRN Meds:  acetaminophen, 650 mg, Oral, Q6H PRN  hydrALAZINE, 5 mg, Intravenous, Q4H PRN  labetalol, 10 mg, Intravenous, Q4H PRN    Fall precautions  Neuro checks  Dysphagia  eval  Stroke teaching    IP CONSULT TO NEUROCRITICAL CARE  IP CONSULT TO NUTRITION SERVICES  IP CONSULT TO CASE MANAGEMENT  IP CONSULT TO PHYSICAL MEDICINE REHAB  IP CONSULT TO NEUROLOGY    Network Utilization Review Department  ATTENTION: Please call with any questions or concerns to [phone] and carefully listen to the prompts so that you are directed to the right person. All voicemails are confidential.   For Discharge needs, contact Care Management DC Support Team at [phone] opt. 2  Send all requests for admission clinical reviews, approved or denied determinations and any other requests to dedicated fax number below belonging to the campus where the patient is receiving treatment. List of dedicated fax numbers for the Facilities:  FACILITY NAME UR FAX NUMBER   ADMISSION DENIALS (Administrative/Medical Necessity) [phone]   DISCHARGE SUPPORT TEAM (NETWORK) [phone]   PARENT CHILD HEALTH (Maternity/NICU/Pediatrics) [phone]   [facility] [phone]   [facility] [phone]   [facility] [phone]   [facility] [phone]   [facility] [phone]   [facility] [phone]   [facility] [phone]   [facility]  [facility] [phone]   [facility] [phone]   [facility] [phone]   [facility] [phone]   [facility] [phone]

## 2024-03-20 ENCOUNTER — DOCUMENTATION (OUTPATIENT)
Dept: NEUROLOGY | Facility: CLINIC | Age: 34
End: 2024-03-20

## 2024-03-20 PROBLEM — I71.23 ANEURYSM OF DESCENDING THORACIC AORTA WITHOUT RUPTURE (HCC): Status: ACTIVE | Noted: 2024-03-20

## 2024-03-20 PROBLEM — I63.81 CEREBROVASCULAR ACCIDENT (CVA) DUE TO OCCLUSION OF SMALL ARTERY (HCC): Status: ACTIVE | Noted: 2024-03-18

## 2024-03-20 PROBLEM — G43.109 MIGRAINE WITH AURA: Status: ACTIVE | Noted: 2024-03-20

## 2024-03-20 LAB
ANION GAP SERPL CALCULATED.3IONS-SCNC: 8 MMOL/L (ref 4–13)
APTT SCREEN TO CONFIRM RATIO: 1.06 RATIO (ref 0–1.34)
APTT SCREEN TO CONFIRM RATIO: 1.17 RATIO (ref 0–1.34)
BUN SERPL-MCNC: 23 MG/DL (ref 5–25)
C-ANCA TITR SER IF: NORMAL TITER
C-ANCA TITR SER IF: NORMAL TITER
CALCIUM SERPL-MCNC: 9.1 MG/DL (ref 8.4–10.2)
CH50 SERPL-ACNC: >60 U/ML
CHLORIDE SERPL-SCNC: 103 MMOL/L (ref 96–108)
CO2 SERPL-SCNC: 24 MMOL/L (ref 21–32)
CONFIRM APTT/NORMAL: 42 SEC (ref 0–47.6)
CONFIRM APTT/NORMAL: 42.8 SEC (ref 0–47.6)
CREAT SERPL-MCNC: 1.16 MG/DL (ref 0.6–1.3)
DME PARACHUTE DELIVERY DATE REQUESTED: NORMAL
DME PARACHUTE ITEM DESCRIPTION: NORMAL
DME PARACHUTE ORDER STATUS: NORMAL
DME PARACHUTE SUPPLIER NAME: NORMAL
DME PARACHUTE SUPPLIER PHONE: NORMAL
ERYTHROCYTE [DISTWIDTH] IN BLOOD BY AUTOMATED COUNT: 13.4 % (ref 11.6–15.1)
GFR SERPL CREATININE-BSD FRML MDRD: 81 ML/MIN/1.73SQ M
GLUCOSE SERPL-MCNC: 99 MG/DL (ref 65–140)
HCT VFR BLD AUTO: 38.7 % (ref 36.5–49.3)
HGB BLD-MCNC: 12.8 G/DL (ref 12–17)
LA PPP-IMP: NORMAL
LA PPP-IMP: NORMAL
MAGNESIUM SERPL-MCNC: 2.7 MG/DL (ref 1.9–2.7)
MCH RBC QN AUTO: 27.8 PG (ref 26.8–34.3)
MCHC RBC AUTO-ENTMCNC: 33.1 G/DL (ref 31.4–37.4)
MCV RBC AUTO: 84 FL (ref 82–98)
MYELOPEROXIDASE AB SER IA-ACNC: <0.2 UNITS (ref 0–0.9)
MYELOPEROXIDASE AB SER IA-ACNC: <0.2 UNITS (ref 0–0.9)
P-ANCA ATYPICAL TITR SER IF: NORMAL TITER
P-ANCA ATYPICAL TITR SER IF: NORMAL TITER
P-ANCA TITR SER IF: NORMAL TITER
P-ANCA TITR SER IF: NORMAL TITER
PHOSPHATE SERPL-MCNC: 2.8 MG/DL (ref 2.7–4.5)
PLATELET # BLD AUTO: 175 THOUSANDS/UL (ref 149–390)
PMV BLD AUTO: 11.2 FL (ref 8.9–12.7)
POTASSIUM SERPL-SCNC: 5.1 MMOL/L (ref 3.5–5.3)
PROT S ACT/NOR PPP: 76 % (ref 61–136)
PROT S ACT/NOR PPP: 80 % (ref 61–136)
PROT S PPP-ACNC: 79 % (ref 60–150)
PROT S PPP-ACNC: 81 % (ref 60–150)
PROTEINASE3 AB SER IA-ACNC: <0.2 UNITS (ref 0–0.9)
PROTEINASE3 AB SER IA-ACNC: <0.2 UNITS (ref 0–0.9)
RBC # BLD AUTO: 4.6 MILLION/UL (ref 3.88–5.62)
SCREEN APTT: 32.9 SEC (ref 0–43.5)
SCREEN APTT: 40 SEC (ref 0–43.5)
SCREEN DRVVT: 35.2 SEC (ref 0–47)
SCREEN DRVVT: 35.9 SEC (ref 0–47)
SODIUM SERPL-SCNC: 135 MMOL/L (ref 135–147)
THROMBIN TIME: 16.6 SEC (ref 0–23)
THROMBIN TIME: 17.1 SEC (ref 0–23)
WBC # BLD AUTO: 6.25 THOUSAND/UL (ref 4.31–10.16)

## 2024-03-20 PROCEDURE — 97116 GAIT TRAINING THERAPY: CPT

## 2024-03-20 PROCEDURE — 99232 SBSQ HOSP IP/OBS MODERATE 35: CPT | Performed by: PSYCHIATRY & NEUROLOGY

## 2024-03-20 PROCEDURE — 83735 ASSAY OF MAGNESIUM: CPT

## 2024-03-20 PROCEDURE — 99232 SBSQ HOSP IP/OBS MODERATE 35: CPT | Performed by: INTERNAL MEDICINE

## 2024-03-20 PROCEDURE — 99406 BEHAV CHNG SMOKING 3-10 MIN: CPT | Performed by: SURGERY

## 2024-03-20 PROCEDURE — 92526 ORAL FUNCTION THERAPY: CPT

## 2024-03-20 PROCEDURE — 83835 ASSAY OF METANEPHRINES: CPT

## 2024-03-20 PROCEDURE — 97112 NEUROMUSCULAR REEDUCATION: CPT

## 2024-03-20 PROCEDURE — 84100 ASSAY OF PHOSPHORUS: CPT

## 2024-03-20 PROCEDURE — 99255 IP/OBS CONSLTJ NEW/EST HI 80: CPT | Performed by: SURGERY

## 2024-03-20 PROCEDURE — 85027 COMPLETE CBC AUTOMATED: CPT

## 2024-03-20 PROCEDURE — 84244 ASSAY OF RENIN: CPT

## 2024-03-20 PROCEDURE — 82088 ASSAY OF ALDOSTERONE: CPT

## 2024-03-20 PROCEDURE — 80048 BASIC METABOLIC PNL TOTAL CA: CPT

## 2024-03-20 PROCEDURE — NC001 PR NO CHARGE: Performed by: PSYCHIATRY & NEUROLOGY

## 2024-03-20 RX ORDER — ACETAMINOPHEN 325 MG/1
975 TABLET ORAL EVERY 8 HOURS SCHEDULED
Status: DISCONTINUED | OUTPATIENT
Start: 2024-03-20 | End: 2024-03-23 | Stop reason: HOSPADM

## 2024-03-20 RX ORDER — MAGNESIUM SULFATE HEPTAHYDRATE 40 MG/ML
2 INJECTION, SOLUTION INTRAVENOUS
Status: CANCELLED | OUTPATIENT
Start: 2024-03-20 | End: 2024-03-23

## 2024-03-20 RX ORDER — METOCLOPRAMIDE HYDROCHLORIDE 5 MG/ML
10 INJECTION INTRAMUSCULAR; INTRAVENOUS EVERY 8 HOURS SCHEDULED
Status: CANCELLED | OUTPATIENT
Start: 2024-03-20 | End: 2024-03-22

## 2024-03-20 RX ORDER — DIPHENHYDRAMINE HCL 25 MG
25 TABLET ORAL
Status: DISCONTINUED | OUTPATIENT
Start: 2024-03-20 | End: 2024-03-23 | Stop reason: HOSPADM

## 2024-03-20 RX ORDER — LABETALOL HYDROCHLORIDE 5 MG/ML
10 INJECTION, SOLUTION INTRAVENOUS EVERY 4 HOURS PRN
Status: DISCONTINUED | OUTPATIENT
Start: 2024-03-20 | End: 2024-03-23 | Stop reason: HOSPADM

## 2024-03-20 RX ORDER — CLOPIDOGREL BISULFATE 75 MG/1
75 TABLET ORAL DAILY
Status: DISCONTINUED | OUTPATIENT
Start: 2024-03-20 | End: 2024-03-23 | Stop reason: HOSPADM

## 2024-03-20 RX ORDER — SODIUM CHLORIDE 9 MG/ML
100 INJECTION, SOLUTION INTRAVENOUS ONCE
Status: CANCELLED | OUTPATIENT
Start: 2024-03-20 | End: 2024-03-20

## 2024-03-20 RX ORDER — ACETAMINOPHEN 10 MG/ML
1000 INJECTION, SOLUTION INTRAVENOUS EVERY 8 HOURS
Status: DISCONTINUED | OUTPATIENT
Start: 2024-03-20 | End: 2024-03-20

## 2024-03-20 RX ORDER — MAGNESIUM SULFATE HEPTAHYDRATE 40 MG/ML
2 INJECTION, SOLUTION INTRAVENOUS DAILY
Status: DISCONTINUED | OUTPATIENT
Start: 2024-03-20 | End: 2024-03-20

## 2024-03-20 RX ORDER — DEXAMETHASONE SODIUM PHOSPHATE 10 MG/ML
10 INJECTION, SOLUTION INTRAMUSCULAR; INTRAVENOUS ONCE
Status: COMPLETED | OUTPATIENT
Start: 2024-03-20 | End: 2024-03-20

## 2024-03-20 RX ORDER — DEXAMETHASONE SODIUM PHOSPHATE 10 MG/ML
10 INJECTION, SOLUTION INTRAMUSCULAR; INTRAVENOUS ONCE
Status: CANCELLED | OUTPATIENT
Start: 2024-03-20 | End: 2024-03-20

## 2024-03-20 RX ORDER — HYDRALAZINE HYDROCHLORIDE 20 MG/ML
5 INJECTION INTRAMUSCULAR; INTRAVENOUS EVERY 4 HOURS PRN
Status: DISCONTINUED | OUTPATIENT
Start: 2024-03-20 | End: 2024-03-23 | Stop reason: HOSPADM

## 2024-03-20 RX ORDER — NICOTINE 21 MG/24HR
21 PATCH, TRANSDERMAL 24 HOURS TRANSDERMAL DAILY
Status: DISCONTINUED | OUTPATIENT
Start: 2024-03-20 | End: 2024-03-23 | Stop reason: HOSPADM

## 2024-03-20 RX ORDER — SUMATRIPTAN 6 MG/.5ML
6 INJECTION, SOLUTION SUBCUTANEOUS
Status: CANCELLED | OUTPATIENT
Start: 2024-03-20

## 2024-03-20 RX ORDER — DIPHENHYDRAMINE HYDROCHLORIDE 50 MG/ML
25 INJECTION INTRAMUSCULAR; INTRAVENOUS EVERY 8 HOURS SCHEDULED
Status: CANCELLED | OUTPATIENT
Start: 2024-03-20 | End: 2024-03-22

## 2024-03-20 RX ORDER — LANOLIN ALCOHOL/MO/W.PET/CERES
6 CREAM (GRAM) TOPICAL
Status: DISCONTINUED | OUTPATIENT
Start: 2024-03-20 | End: 2024-03-23 | Stop reason: HOSPADM

## 2024-03-20 RX ADMIN — LABETALOL HYDROCHLORIDE 10 MG: 5 INJECTION, SOLUTION INTRAVENOUS at 00:45

## 2024-03-20 RX ADMIN — ACETAMINOPHEN 975 MG: 325 TABLET, FILM COATED ORAL at 13:04

## 2024-03-20 RX ADMIN — ASPIRIN 81 MG CHEWABLE TABLET 81 MG: 81 TABLET CHEWABLE at 08:02

## 2024-03-20 RX ADMIN — AMLODIPINE BESYLATE 10 MG: 10 TABLET ORAL at 08:02

## 2024-03-20 RX ADMIN — ACETAMINOPHEN 975 MG: 325 TABLET, FILM COATED ORAL at 21:21

## 2024-03-20 RX ADMIN — NICOTINE 21 MG: 21 PATCH, EXTENDED RELEASE TRANSDERMAL at 12:54

## 2024-03-20 RX ADMIN — DIPHENHYDRAMINE HCL 25 MG: 25 TABLET ORAL at 21:21

## 2024-03-20 RX ADMIN — METHADONE HYDROCHLORIDE 105 MG: 10 CONCENTRATE ORAL at 08:03

## 2024-03-20 RX ADMIN — MAGNESIUM SULFATE HEPTAHYDRATE 2 G: 40 INJECTION, SOLUTION INTRAVENOUS at 03:55

## 2024-03-20 RX ADMIN — LISINOPRIL 5 MG: 5 TABLET ORAL at 08:03

## 2024-03-20 RX ADMIN — CLOPIDOGREL BISULFATE 75 MG: 75 TABLET ORAL at 11:09

## 2024-03-20 RX ADMIN — DEXAMETHASONE SODIUM PHOSPHATE 10 MG: 10 INJECTION, SOLUTION INTRAMUSCULAR; INTRAVENOUS at 04:04

## 2024-03-20 RX ADMIN — ENOXAPARIN SODIUM 40 MG: 40 INJECTION SUBCUTANEOUS at 08:03

## 2024-03-20 RX ADMIN — CHLORHEXIDINE GLUCONATE 15 ML: 1.2 SOLUTION ORAL at 08:03

## 2024-03-20 RX ADMIN — METHADONE HYDROCHLORIDE 120 MG: 10 TABLET ORAL at 17:37

## 2024-03-20 RX ADMIN — ACETAMINOPHEN 1000 MG: 10 INJECTION INTRAVENOUS at 04:00

## 2024-03-20 RX ADMIN — RIMEGEPANT SULFATE 75 MG: 75 TABLET, ORALLY DISINTEGRATING ORAL at 04:04

## 2024-03-20 RX ADMIN — CARVEDILOL 12.5 MG: 12.5 TABLET, FILM COATED ORAL at 17:37

## 2024-03-20 RX ADMIN — MELATONIN 6 MG: at 00:33

## 2024-03-20 RX ADMIN — CHLORHEXIDINE GLUCONATE 15 ML: 1.2 SOLUTION ORAL at 21:21

## 2024-03-20 RX ADMIN — MELATONIN 6 MG: at 21:21

## 2024-03-20 RX ADMIN — SENNOSIDES, DOCUSATE SODIUM 1 TABLET: 8.6; 5 TABLET ORAL at 13:07

## 2024-03-20 RX ADMIN — ATORVASTATIN CALCIUM 40 MG: 40 TABLET, FILM COATED ORAL at 17:37

## 2024-03-20 RX ADMIN — CARVEDILOL 12.5 MG: 12.5 TABLET, FILM COATED ORAL at 08:02

## 2024-03-20 NOTE — ASSESSMENT & PLAN NOTE
5.0 cm descending thoracic aneurysm without chest pain or abdominal pain  Vascular consulted - o/p follow up   Smoking cessation advised   Continue statin asa    patient to avoid heavy lifting

## 2024-03-20 NOTE — DISCHARGE INSTR - AVS FIRST PAGE
Dear DEANNA Ashraf,     It was our pleasure to care for you here at [facility].  It is our hope that we were always able to exceed the expected standards for your care during your stay.  You were hospitalized due to acute stroke.  You were cared for on the P[facility]7 floor by Lisa Cuenca MD under the service of Bienvenido Cortez MD with the [facility]ist [facility] who covers for your primary care physician (PCP), No primary care provider on file., while you were hospitalized.  If you have any questions or concerns related to this hospitalization, you may contact us at .  For follow up as well as any medication refills, we recommend that you follow up with your primary care physician.  A registered nurse will reach out to you by phone within a few days after your discharge to answer any additional questions that you may have after going home.  However, at this time we provide for you here, the most important instructions / recommendations at discharge:     Notable Medication Adjustments - please refer to medication list below   Testing Required after Discharge -   Zio patch (obtain through cardiology)   Repeat thrombosis panel in 6 weeks  ** Please contact your PCP to request testing orders for any of the testing recommended here **  Important follow up information -   Follow up with primary care physician within one week of discharge  Follow up with neurology outpatient in 6 weeks  Follow up with nephrology   Follow-up with cardiology for Zio patch  Follow-up with vascular surgery for aneurysm monitoring  Follow-up with neurosurgery for aneurysm monitoring  Other Instructions -   All referrals have been placed at this time  If you experience worsening symptoms please return to the emergency department for further patient  Please review this entire after visit summary as additional general instructions including medication list, appointments, activity, diet, any  pertinent wound care, and other additional recommendations from your care team that may be provided for you.   Silver Nitrate Text: The wound bed was treated with silver nitrate after the biopsy was performed.

## 2024-03-20 NOTE — PLAN OF CARE
Problem: SAFETY ADULT  Goal: Patient will remain free of falls  Description: INTERVENTIONS:  - Educate patient/family on patient safety including physical limitations  - Instruct patient to call for assistance with activity   - Consult OT/PT to assist with strengthening/mobility   - Keep Call bell within reach  - Keep bed low and locked with side rails adjusted as appropriate  - Keep care items and personal belongings within reach  - Initiate and maintain comfort rounds  - Make Fall Risk Sign visible to staff  - Offer Toileting every 2 Hours, in advance of need  - Initiate/Maintain bed alarm  - Obtain necessary fall risk management equipment: non skid footwear  - Apply yellow socks and bracelet for high fall risk patients  - Consider moving patient to room near nurses station  Outcome: Progressing     Problem: PAIN - ADULT  Goal: Verbalizes/displays adequate comfort level or baseline comfort level  Description: Interventions:  - Encourage patient to monitor pain and request assistance  - Assess pain using appropriate pain scale  - Administer analgesics based on type and severity of pain and evaluate response  - Implement non-pharmacological measures as appropriate and evaluate response  - Consider cultural and social influences on pain and pain management  - Notify physician/advanced practitioner if interventions unsuccessful or patient reports new pain  Outcome: Progressing     Problem: INFECTION - ADULT  Goal: Absence or prevention of progression during hospitalization  Description: INTERVENTIONS:  - Assess and monitor for signs and symptoms of infection  - Monitor lab/diagnostic results  - Monitor all insertion sites, i.e. indwelling lines, tubes, and drains  - Monitor endotracheal if appropriate and nasal secretions for changes in amount and color  - [place] appropriate cooling/warming therapies per order  - Administer medications as ordered  - Instruct and encourage patient and family to use good hand  hygiene technique  - Identify and instruct in appropriate isolation precautions for identified infection/condition  Outcome: Progressing     Problem: Neurological Deficit  Goal: Neurological status is stable or improving  Description: Interventions:  - Monitor and assess patient's level of consciousness, motor function, sensory function, and level of assistance needed for ADLs.   - Monitor and report changes from baseline. Collaborate with interdisciplinary team to initiate plan and implement interventions as ordered.   - Provide and maintain a safe environment.  - Consider seizure precautions.  - Consider fall precautions.  - Consider aspiration precautions.  - Consider bleeding precautions.  Outcome: Progressing     Problem: Activity Intolerance/Impaired Mobility  Goal: Mobility/activity is maintained at optimum level for patient  Description: Interventions:  - Assess and monitor patient  barriers to mobility and need for assistive/adaptive devices.  - Assess patient's emotional response to limitations.  - Collaborate with interdisciplinary team and initiate plans and interventions as ordered.  - Encourage independent activity per ability.  - Maintain proper body alignment.  - Perform active/passive rom as tolerated/ordered.  - Plan activities to conserve energy.  - Turn patient as appropriate  Outcome: Progressing     Problem: Prexisting or High Potential for Compromised Skin Integrity  Goal: Skin integrity is maintained or improved  Description: INTERVENTIONS:  - Identify patients at risk for skin breakdown  - Assess and monitor skin integrity  - Assess and monitor nutrition and hydration status  - Monitor labs   - Assess for incontinence   - Turn and reposition patient  - Assist with mobility/ambulation  - Relieve pressure over bony prominences  - Avoid friction and shearing  - Provide appropriate hygiene as needed including keeping skin clean and dry  - Evaluate need for skin moisturizer/barrier cream  -  Collaborate with interdisciplinary team   - Patient/family teaching  - Consider wound care consult   Outcome: Progressing

## 2024-03-20 NOTE — PROGRESS NOTES
Met with patient at bedside in room 726. Introduced my role. Provided stroke education including the stroke education booklet and magnet. Reviewed stroke-like symptoms. Reviewed stroke risk factors. Patient is a current tobacco smoker. Reviewed smoking cessation, patient is determined to quit.     Neurology follow up instructions are pending. Patient is agreeable to outreach phone calls after discharge. Call bell is within reach. He was appreciative.

## 2024-03-20 NOTE — SPEECH THERAPY NOTE
"Speech Language/Pathology    Speech/Language Pathology Progress Note    Patient Name: DEANNA Ashraf  Today's Date: 3/20/2024    Subjective:  Pt was alert and awake. He was sitting upright in bed w/ 2 family members present. He said, \"I just had gene sanchez.\"    Objective:  Pt was seen today for dysphagia therapy. Current diet is dysphagia 3/dental soft w/ thin liquids. Pt was on room air. He completed oral care w/ the use of oral care kit. The focus of today's session was to assess for potential diet upgrade. Pt was given ice water and hard cookies.   Swallow function:  Pt's mastication was slightly diminished but functional d/t lack of dentition. Swallow initiation appeared prompt. Hyolaryngeal rise was palpated and judged to be WFL. There were no s/s of aspiration during today's session.     Assessment:  Swallow function appears to have improved w/ current diet. Pt reports that he would like a regular diet and says, \"I'm fine gumming it. I can make my own decisions.\"     Plan/Recommendations:  Upgrade to a regular diet w/ thin liquids. ST f/u not needed at this time. Please reconsult w/ any changes or concerns.     "

## 2024-03-20 NOTE — PROGRESS NOTES
Progress Note - Neurology   DEANNA Ashraf 33 y.o. male MRN: [id number]  Unit/Bed#: [facility] 726-01 Encounter: [id number]      Assessment/Plan     Right hemispheric stroke  Assessment & Plan  33 y.o. male with uncontrolled HTN (poor medication compliance) and prior heroin abuse now on methadone who initially presented to [facility] on 3/17/2024 as a stroke alert for left-sided weakness/numbness, unsteady gait, 10/10 headache, and transient right-sided vision loss (resolved within minutes).      -/119 on arrival.    -NIHSS 5 (left facial droop, LUE/LLE drift, LUE/LLE sensory loss, mild/moderate dysarthria).    -CT head showed chronic right basal ganglia lacunar infarct.    -CTA head/neck revealed occluded left PCA proximally with some distal reconstitution, moderate atherosclerotic narrowing of the anterior left M2 branch, and 2 mm aneurysm of the anterior communicating artery at the junction of the left A1/A2 segments.    -Patient was loaded with aspirin 324 mg once, started on Cardene drip, and transferred to [facility] for possible neurosurgery intervention.  -MRI brain: Acute infarct in right posterior caudate extending into right posterior limb of internal capsule.  No vessel wall thickening or vessel wall enhancement to suggest active vasculitis.   Chronic infarct in posterior putamen extending into right external capsule with hemosiderin deposition, likely sequelae of hemorrhagic infarct.   Chronic lacunar infarcts in bilateral roberto.   Multiple chronic microhemorrhages in bilateral roberto and to a lesser extent in right caudate, left putamen, and bilateral cerebellum. Findings suggestive of hypertensive arteriopathy.  -MRA head: Probable chronic occlusion of proximal left PCA with faint flow-related signal more distally possibly due to collateral vessels. Moderate-to-severe short segment narrowing in bilateral MCA M2 branch vessels. Findings raise possibility of vasculitis   over atherosclerotic disease given patient's age.  Consider cerebral angiography for further evaluation.  Unchanged 0.2 cm anterior communicating artery aneurysm.  -2D echo with EF 65%, dilated LA, normal RA, no MAC.    Labs:   Abnormal:  , UDS positive for methadone.  CRP elevated (4.7, 5.9, 5.13), elevated troponin  WNL: A1c 5.4, Flu/RSV, NISHA, C3/C4 complement, ESR, lactic acid, ethanol, TSH, ANCA, Sjogren's, RF, ACE  Thrombosis panel with low Protein S at 61 (but with questionable significance in acute setting).    The left M2 narrowing does not correlate with his presenting symptoms.  The L PCA occlusion is likely an incidental finding, and does not correlate with current symptoms.    Acute stroke noted in right posterior caudate may be small vessel in etiology but cannot entirely exclude embolic source.    Plan:  - Neurosurgery consulted; appreciate recommendations  - Stroke pathway  Recommend VALENTINA  S/p aspirin 325 mg once on 3/17  Continue aspirin 81 mg daily (antiplatelet naive). Add Plavix 75mg x21 days, then d/c Plavix and remain on ASA monotherapy.  Continue atorvastatin 40 mg   Goal normotension  Repeat thrombosis panel in 6 weeks  Check D-dimer  Continue telemetry  PT/OT/ST  Stroke education  Frequent neuro checks. Continue to monitor and notify neurology with any changes.  STAT CT head for any acute change in neuro exam  - Medical management and supportive care per primary team. Correction of any metabolic or infectious disturbances.           DEANNA Ashraf will need follow up in in 6 weeks with neurovascular attending. He will require thrombosis panel to be repeated in ~6 weeks.    Subjective:   Patient states he had a 10/10 headache last evening with nausea and was given Tylenol, magnesium sulfate, Nurtec, Decadron and as of this morning, headache and nausea have resolved.  Patient does not notice any significant weakness but still feels unsteady on his feet when attempting to ambulate.  Strength and sensation in the left lower extremity has  "improved.  Denies new symptoms.    ROS: 12 point review of systems performed and negative except as indicated above.    Vitals: Blood pressure (!) 172/92, pulse 67, temperature 98.6 °F (37 °C), resp. rate 16, height 5' 9\" (1.753 m), weight 82 kg (180 lb 12.4 oz), SpO2 98%.,Body mass index is 26.7 kg/m².    Physical Exam:   General:  Patient is well-developed, well-nourished, and in no acute distress.  HEENT:  Head normocephalic.  Eyes anicteric.  Edentulous.  Cardiovascular:  With regular rhythm.  Lungs:  Normal effort. Nonlabored breathing.  Extremities:  With no significant edema.    Skin: No rashes.  Neurologic:  Patient is alert, pleasantly interactive, and appropriately conversational.  No obvious symbolic language difficulty or dysarthria, and the patient is fully oriented.      Gait deferred for safety.    Cranial Nerves:   II: Visual fields full to confrontation. Pupils equal, round, reactive to light with normal accomodation.  Cannot visualize optic fundi.  III,IV,VI: extraocular movements intact with 2-3 beats of endgaze nystagmus.  V: Diminished light touch appreciation left face as compared to right.  VII: Left nasolabial fold flattening noted.  XII: Tongue midline with no atrophy or fasciculations with appropriate movement.     Coordination: Dysmetria noted with left finger-nose but accurate on right.  Ackert with bilateral heel-to-shin maneuvers.    Motor testing with left upper extremity pronator drift.  4-4+/5 throughout the left upper extremity.  Strength otherwise full.    Sensory testing grossly intact to light touch throughout.     Brisk reflexes noted throughout, without evidence of clonus.  Jim's bilaterally negative.    Toe responses are downgoing bilaterally.    Lab, Imaging and other studies: CBC:   Results from last 7 days   Lab Units 03/20/24  0654 03/19/24  0509 03/18/24  0501   WBC Thousand/uL 6.25 7.25 7.09   RBC Million/uL 4.60 4.60 5.01   HEMOGLOBIN g/dL 12.8 12.7 13.8 "   HEMATOCRIT % 38.7 38.7 41.6   MCV fL 84 84 83   PLATELETS Thousands/uL 175 169 171   , BMP/CMP:   Results from last 7 days   Lab Units 03/20/24  0654 03/19/24  1417 03/19/24  0509 03/18/24  0501 03/17/24  1226   SODIUM mmol/L 135 137 139 137 134*   POTASSIUM mmol/L 5.1 3.8 3.4* 3.5 3.7   CHLORIDE mmol/L 103 104 104 100 101   CO2 mmol/L 24 26 26 25 28   BUN mg/dL 23 19 19 18 17   CREATININE mg/dL 1.16 0.88 0.90 1.07 1.08   CALCIUM mg/dL 9.1 9.5 9.0 9.2 9.0   AST U/L  --   --  17 17 13   ALT U/L  --   --  16 17 15   ALK PHOS U/L  --   --  58 66 54   EGFR ml/min/1.73sq m 81 111 110 89 88   , HgBA1C:   Results from last 7 days   Lab Units 03/18/24  0501   HEMOGLOBIN A1C % 5.4   , Lipid Profile:   Results from last 7 days   Lab Units 03/18/24  0501   HDL mg/dL 42   LDL CALC mg/dL 110*   TRIGLYCERIDES mg/dL 33     VTE Prophylaxis: Sequential compression device (Venodyne)     Counseling / Coordination of Care  I have spent a total time of 45 minutes on 03/20/24 in caring for this patient including Diagnostic results, Prognosis, Risks and benefits of tx options, Instructions for management, Patient and family education, Importance of tx compliance, Risk factor reductions, Impressions, Counseling / Coordination of care, Documenting in the medical record, Reviewing / ordering tests, medicine, procedures  , Obtaining or reviewing history  , and Communicating with other healthcare professionals .

## 2024-03-20 NOTE — CONSULTS
Consult Note - Vascular Surgery   DEANNA Ashraf 33 y.o. male MRN: [id number]    Unit/Bed#: [facility] 726-01 Encounter: [id number]    Assessment:  33 year old male with CVA of L PCA, hypertensive emergency, polycystic kidney disease, aneurysm of anterior communicating artery, consulted for 5.0 cm descending thoracic aneurysm without chest pain or abdominal pain.    Plan:  Patient transferred to [facility] for possible thrombectomy-not completed.  Patient requires aggressive BP control with ongoing treatment for SBP ranging between 170 and over 200.  Agree with ASA and statin.  Discussed with patient cigarette smoking is very large risk factor for aneurysms and importance of quitting.  No pending signs of rupture.  Consider blood cultures to rule out bacteremia/possible mycotic aneurysm.  No emergent surgical intervention required.  Patient needs to follow up as outpatient.    Tobacco use is a significant patient-modifiable risk factor for this patient’s vascular disease with multiple vascular comorbidities, and a significant risk factor for failure of and complications from any endovascular or surgical interventions.    I explained to the patient the effects of smoking including peripheral artery disease, coronary artery disease, cerebrovascular disease as well as cancer and chronic obstructive pulmonary disease. I asked the patient to stop smoking immediately. It is never too late to quit, and many studies show significant health benefits as well as economical savings after smoking cessation. I offered to the patient nicotine replacement therapy as well as referral to the smoking cessation program and access to the quit line 9-003-HXAEIQW or ambulatory referral to our network smoking cessation program.    Based on our conversation, this patient appears motivated to quit  And plans to use nicotine replacement to help quit    The patient did not set a quit date. I will continue to  follow up on this issue at our next scheduled  visit.     I spent approximately 10 minutes on tobacco cessation counseling with this patient.      Consulting Service: SOD    Chief Complaint: headache, visual changes    HPI: DEANNA Ashraf is a 33 y.o. male who presented to [facility] on 3/17/2024 as a stroke alert around noon.  The patient reports on 3/16/2024 he had loss of vision in his right that lasted about 1 minute.  On March 17, 2024, at approximately 3 am he experienced gait instability and visual changes.  On presentation he had left sided weakness with a NIH score of 5 and was not a candidate for TNK due to the timing.  The patient was admitted to critical care under the stroke pathway.  The patient was seen by neurology who recommended blood pressure control, ASA and statin.  The patient was also seen by nephrology for polycystic kidney.  On 3/19/2024 the patient developed a severe headache and was transferred to [facility] for possible endovascular intervention.  The headache has since improved with medications only.  As part of the patient's evaluation, a CTA c/a/p was completed and the patient was noted to have a 5.0 cm fusiform aneurysm at the level of the hiatus.  The patient denies chest or abdominal pain, acutely or in the past.  The patient reports his mother had a brain aneurysm.  He does have known history of hypertension. However, he has not consistently taken anti-hypertensives for approximately two years.  His only other PMH is opiate use disorder currently on methadone.    Review of Systems:  General: negative for - chills, fatigue, or fever  Cardiovascular: no chest pain or dyspnea on exertion  Respiratory: no cough, shortness of breath, or wheezing  Gastrointestinal: no abdominal pain, change in bowel habits, or black or bloody stools  Genitourinary ROS: no dysuria, trouble voiding, or hematuria  Musculoskeletal ROS: positive for - muscular weakness  Neurological ROS: positive for - headaches, impaired coordination/balance, visual changes, and  weakness  Hematological and Lymphatic ROS: negative  Dermatological ROS: negative  Psychological ROS: negative  Ophthalmic ROS: negative  ENT ROS: negative    Past Medical History:  Past Medical History:   Diagnosis Date    Hypertension        Past Surgical History:  No past surgical history on file.    Social History:  Social History     Substance and Sexual Activity   Alcohol Use Not Currently     Social History     Substance and Sexual Activity   Drug Use Not Currently     Social History     Tobacco Use   Smoking Status Every Day    Current packs/day: 0.25    Average packs/day: 0.3 packs/day for 20.0 years (5.0 ttl pk-yrs)    Types: Cigarettes   Smokeless Tobacco Never       Family History:  No family history on file.    Allergies:  Allergies   Allergen Reactions    Haloperidol Hives and Other (See Comments)     Other reaction(s): Extrapyramidal Symptoms       Medications:  Current Facility-Administered Medications   Medication Dose Route Frequency    acetaminophen (TYLENOL) tablet 975 mg  975 mg Oral Q8H PRN    amLODIPine (NORVASC) tablet 10 mg  10 mg Oral Daily    aspirin chewable tablet 81 mg  81 mg Oral Daily    atorvastatin (LIPITOR) tablet 40 mg  40 mg Oral QPM    carvedilol (COREG) tablet 12.5 mg  12.5 mg Oral BID With Meals    chlorhexidine (PERIDEX) 0.12 % oral rinse 15 mL  15 mL Mouth/Throat Q12H ARYA    enoxaparin (LOVENOX) subcutaneous injection 40 mg  40 mg Subcutaneous Daily    hydrALAZINE (APRESOLINE) injection 5 mg  5 mg Intravenous Q4H PRN    labetalol (NORMODYNE) injection 10 mg  10 mg Intravenous Q4H PRN    lisinopril (ZESTRIL) tablet 5 mg  5 mg Oral Daily    melatonin tablet 6 mg  6 mg Oral HS    methadone (DOLOPHINE) oral concentrated solution 105 mg  105 mg Oral Daily    methadone (DOLOPHINE) tablet 120 mg  120 mg Oral QPM    polyethylene glycol (MIRALAX) packet 17 g  17 g Oral Daily PRN    senna-docusate sodium (SENOKOT S) 8.6-50 mg per tablet 1 tablet  1 tablet Oral BID PRN  "      Vitals:  BP (!) 171/81   Pulse 72   Temp 99.4 °F (37.4 °C)   Resp 19   Ht 5' 9\" (1.753 m)   Wt 82.8 kg (182 lb 9.6 oz)   SpO2 98%   BMI 26.97 kg/m²     I/Os:  I/O last 24 hours:  In: 0   Out: 250 [Urine:250]    Lab Results and Cultures:   Lab Results   Component Value Date    WBC 7.25 03/19/2024    HGB 12.7 03/19/2024    HCT 38.7 03/19/2024    MCV 84 03/19/2024     03/19/2024     Lab Results   Component Value Date    CALCIUM 9.5 03/19/2024    K 3.8 03/19/2024    CO2 26 03/19/2024     03/19/2024    BUN 19 03/19/2024    CREATININE 0.88 03/19/2024     Lab Results   Component Value Date    INR 1.10 03/17/2024    PROTIME 14.6 (H) 03/17/2024       Lipid Panel: No results found for: \"CHOL\",     Blood Culture: No results found for: \"BLOODCX\",   Urinalysis:   Lab Results   Component Value Date    COLORU Yellow 03/17/2024    CLARITYU Clear 03/17/2024    SPECGRAV 1.010 03/17/2024    PHUR 8.5 (A) 03/17/2024    LEUKOCYTESUR Negative 03/17/2024    NITRITE Negative 03/17/2024    GLUCOSEU Negative 03/17/2024    KETONESU Negative 03/17/2024    BILIRUBINUR Negative 03/17/2024    BLOODU Negative 03/17/2024   ,   Urine Culture: No results found for: \"URINECX\",   Wound Culure: No results found for: \"WOUNDCULT\"    Imaging:  Left PCA occlusion  2mm aneurysm of SHARAD at junction of A1 and A2 segments  5.0 cm fusiform aneurysm of the descending aorta at the level of the hiatus      Physical Exam:    General appearance: alert and oriented, in no acute distress  Skin: Skin color, texture, turgor normal. No rashes or lesions  Neurologic: Grossly normal, no deficits at this time  Head: Normocephalic, without obvious abnormality, atraumatic  Eyes: EOMI  Throat: poor dentitia  Lungs: No resp distress  Chest wall: no tenderness  Heart: reg rate  Abdomen: soft, NT  Extremities: extremities normal, warm and well-perfused; no cyanosis, clubbing, or edema    Wound/Incision:  N/a           Myesha oMrales PA-C  3/20/2024    "

## 2024-03-20 NOTE — PHYSICAL THERAPY NOTE
PHYSICAL THERAPY NOTE      Patient Name: DEANNA Ashraf  Today's Date: 3/20/2024       03/20/24 1306   PT Last Visit   PT Visit Date 03/20/24   Note Type   Note Type Treatment   Pain Assessment   Pain Assessment Tool 0-10   Pain Score No Pain   Restrictions/Precautions   Weight Bearing Precautions Per Order No   Other Precautions Impulsive;Chair Alarm;Bed Alarm;Fall Risk   General   Chart Reviewed Yes   Response to Previous Treatment Patient with no complaints from previous session.   Family/Caregiver Present Yes  (Wife + mother)   Cognition   Overall Cognitive Status WFL   Arousal/Participation Cooperative;Alert   Attention Attends with cues to redirect   Orientation Level Oriented X4   Memory Within functional limits   Following Commands Follows one step commands with increased time or repetition   Comments Pt very pleasant and cooperative. Impulsive at times and requires occasional cueing for safety and pacing   Bed Mobility   Supine to Sit Unable to assess   Sit to Supine 5  Supervision   Additional Comments Pt sitting EOb upon arrival. Pt returned to bed following PT sesison   Transfers   Sit to Stand 4  Minimal assistance   Additional items Assist x 1;Increased time required;Verbal cues   Stand to Sit 4  Minimal assistance   Additional items Assist x 1;Increased time required;Verbal cues   Additional Comments w/ HHA, min A for balance and eccentric control of stand to sit   Ambulation/Elevation   Gait pattern Improper Weight shift;Narrow ITZ;Decreased foot clearance;Scissoring;Ataxia;Inconsistent chandler;Excessively slow   Gait Assistance 3  Moderate assist   Additional items Assist x 1;Verbal cues   Assistive Device Rolling walker;Other (Comment)  (HHA)   Distance 30' w/ HHA, 50' w/ RW   Ambulation/Elevation Additional Comments Multiple LOB and poor dynamic balance ambulating w/ L HHA. Improved balance and support with RW. Recommend RW for all  OOB mobility   Balance   Static Sitting Fair +   Dynamic Sitting Fair   Static Standing Poor +   Dynamic Standing Poor   Ambulatory Poor   Endurance Deficit   Endurance Deficit Yes   Endurance Deficit Description ataxia, fatigue, weakness   Activity Tolerance   Activity Tolerance Patient limited by fatigue;Other (Comment)  (ataxia)   Medical Staff Made Aware Spoke w/ CM regarding DC recommendation   Nurse Made Aware RN cleared and updated   Exercises   Balance training  Pt completed balance tasks standing unsupproted including standing w/ eyes closed, turning in [place], maintaining balance against perturbations and ambulating w/ and w/o AD. Tinetti score: 10/16, 4/12 for a total score of 14/28   Assessment   Prognosis Good   Problem List Decreased strength;Decreased endurance;Impaired balance;Decreased mobility;Decreased coordination;Pain   Assessment Pt seen for PT treatment session this date. Therapy session focused on gait training and balance training in order to improve overall mobility and independence. Pt requires min A for STS, mod A for ambulation w/ and w/o RW. Pt ambulates with ataxic gait, decreased BL foot clearance and poor balance recovery requiring mod Ax1 to correct multiple LOB. Recommend RW for all OOB mobility at this time, although this is below pt baseline level of mobility. Tinetti balance assessment conducted w/ pt scoring 14/28, making pt a high falls risk. Pt making good progress toward goals. Pt was left supine at the end of PT session with all needs in reach. Pt would benefit from continued PT services while in hospital to address remaining limitations. Pt is currently at an increased falls risk and is functioning far below baseline level of mobility. PT to continue treating pt and recommends acute rehab. The patient's AM-PAC Basic Mobility Inpatient Short Form Raw Score is 15. A Raw score of less than or equal to 16 suggests the patient may benefit from discharge to post-acute  rehabilitation services. Please also refer to the recommendation of the Physical Therapist for safe discharge planning.   Goals   Patient Goals to go home   LTG Expiration Date 04/01/24   PT Treatment Day 1   Plan   Treatment/Interventions Functional transfer training;LE strengthening/ROM;Elevations;Therapeutic exercise;Endurance training;Patient/family training;Equipment eval/education;Bed mobility;Gait training;Spoke to nursing;Spoke to case management;OT   Progress Progressing toward goals   PT Frequency 3-5x/wk   Discharge Recommendation   Rehab Resource Intensity Level, PT I (Maximum Resource Intensity)   Equipment Recommended Walker   Walker Package Recommended Wheeled walker   AM-PAC Basic Mobility Inpatient   Turning in Flat Bed Without Bedrails 3   Lying on Back to Sitting on Edge of Flat Bed Without Bedrails 3   Moving Bed to Chair 3   Standing Up From Chair Using Arms 3   Walk in Room 2   Climb 3-5 Stairs With Railing 1   Basic Mobility Inpatient Raw Score 15   Basic Mobility Standardized Score 36.97   [facility] Highest Level Of Mobility   -HLM Goal 4: Move to chair/commode   JH-HLM Achieved 7: Walk 25 feet or more   Education   Education Provided Mobility training;Assistive device   Patient Demonstrates acceptance/verbal understanding   End of Consult   Patient Position at End of Consult Supine;All needs within reach     Sujatha Collier PT, DPT

## 2024-03-20 NOTE — PROGRESS NOTES
NEPHROLOGY PROGRESS NOTE   DEANNA Ashraf 33 y.o. male MRN: [id number]  Unit/Bed#: [facility] 726-01 Encounter: [id number]  Reason for Consult: Polycystic kidney disease, hypertension    ASSESSMENT/PLAN:  Polycystic kidney disease with positive family medical history (mother) noted bilateral renal cysts and cerebral aneurysm.  Hypertensive emergency with recent CVA recent MRI showing acute infarct in the right posterior caudate, chronic infarct of the posterior putamen and chronic lacunar infarcts in the bilateral roberto with noted multiple chronic microhemorrhages   Plasma metanephrines pending  Aldosterone/renin level pending  Noted abnormal renal Doppler suspicion for ESTELITA.  Suspicion for possible left renal artery stenosis with noted velocities at 528.  However previous CTA showed no evidence of flow-limiting atherosclerotic disease of the aorta or major branch vessels.  Hypokalemia, continue with replacement as needed.      Overall blood pressure appears to be slowly improving with less variability  Continue with amlodipine, carvedilol as well as low-dose lisinopril.  Will gradually titrate medications as to avoid relative hypotension.  No changes currently continue monitor renal function blood pressure closely.    SUBJECTIVE:  Seen and examined.  Patient awake and alert.  Offers no new complaints.  Denies any chest pain shortness of breath or abdominal pain.  Appetite is stable.    Review of Systems    OBJECTIVE:  Current Weight: Weight - Scale: 82 kg (180 lb 12.4 oz)  Vitals:    03/20/24 0440 03/20/24 0534 03/20/24 0708 03/20/24 1040   BP: 134/63  (!) 172/92 158/83   Pulse: 58  67 65   Resp:   16 17   Temp:   98.6 °F (37 °C)    TempSrc:       SpO2: 98%  98% 97%   Weight:  82 kg (180 lb 12.4 oz)     Height:           Intake/Output Summary (Last 24 hours) at 3/20/2024 1140  Last data filed at 3/20/2024 0802  Gross per 24 hour   Intake 360 ml   Output 400 ml   Net -40 ml       Physical Exam  Constitutional:        Appearance: He is not ill-appearing.   Eyes:      General: No scleral icterus.  Cardiovascular:      Rate and Rhythm: Normal rate and regular rhythm.   Pulmonary:      Effort: Pulmonary effort is normal.      Breath sounds: Normal breath sounds.   Abdominal:      General: There is no distension.      Palpations: Abdomen is soft.      Tenderness: There is no abdominal tenderness.   Musculoskeletal:      Right lower leg: No edema.      Left lower leg: No edema.   Skin:     General: Skin is warm and dry.      Findings: No rash.   Neurological:      Mental Status: He is alert and oriented to person, place, and time.         Medications:    Current Facility-Administered Medications:     amLODIPine (NORVASC) tablet 10 mg, 10 mg, Oral, Daily, Mitesh Burt MD, 10 mg at 03/20/24 0802    aspirin chewable tablet 81 mg, 81 mg, Oral, Daily, Mitesh Burt MD, 81 mg at 03/20/24 0802    atorvastatin (LIPITOR) tablet 40 mg, 40 mg, Oral, QPM, Mitesh Burt MD, 40 mg at 03/19/24 1816    carvedilol (COREG) tablet 12.5 mg, 12.5 mg, Oral, BID With Meals, Mitesh Burt MD, 12.5 mg at 03/20/24 0802    chlorhexidine (PERIDEX) 0.12 % oral rinse 15 mL, 15 mL, Mouth/Throat, Q12H ARYA, Mitesh Burt MD, 15 mL at 03/20/24 0803    clopidogrel (PLAVIX) tablet 75 mg, 75 mg, Oral, Daily, Betsy Angulo PA-C, 75 mg at 03/20/24 1109    enoxaparin (LOVENOX) subcutaneous injection 40 mg, 40 mg, Subcutaneous, Daily, Mitesh Burt MD, 40 mg at 03/20/24 0803    hydrALAZINE (APRESOLINE) injection 5 mg, 5 mg, Intravenous, Q4H PRN, Lisa Cuenca MD    labetalol (NORMODYNE) injection 10 mg, 10 mg, Intravenous, Q4H PRN, Lisa Cuenca MD    lisinopril (ZESTRIL) tablet 5 mg, 5 mg, Oral, Daily, Mitesh Burt MD, 5 mg at 03/20/24 0803    melatonin tablet 6 mg, 6 mg, Oral, HS, Mitesh Burt MD, 6 mg at 03/20/24 0033    methadone (DOLOPHINE) oral concentrated solution 105 mg, 105 mg, Oral, Daily, Mitesh Burt MD, 105 mg at  03/20/24 0803    methadone (DOLOPHINE) tablet 120 mg, 120 mg, Oral, QPM, Mitesh Burt MD, 120 mg at 03/19/24 1855    nicotine (NICODERM CQ) 21 mg/24 hr TD 24 hr patch 21 mg, 21 mg, Transdermal, Daily, Lisa Cuenca MD    polyethylene glycol (MIRALAX) packet 17 g, 17 g, Oral, Daily PRN, Mitesh Burt MD    senna-docusate sodium (SENOKOT S) 8.6-50 mg per tablet 1 tablet, 1 tablet, Oral, BID PRN, Mitesh Burt MD    Laboratory Results:  Results from last 7 days   Lab Units 03/20/24  0654 03/19/24  1417 03/19/24  0509 03/18/24  0501 03/17/24  1226   WBC Thousand/uL 6.25  --  7.25 7.09 7.01   HEMOGLOBIN g/dL 12.8  --  12.7 13.8 12.9   HEMATOCRIT % 38.7  --  38.7 41.6 38.7   PLATELETS Thousands/uL 175  --  169 171 150   POTASSIUM mmol/L 5.1 3.8 3.4* 3.5 3.7   CHLORIDE mmol/L 103 104 104 100 101   CO2 mmol/L 24 26 26 25 28   BUN mg/dL 23 19 19 18 17   CREATININE mg/dL 1.16 0.88 0.90 1.07 1.08   CALCIUM mg/dL 9.1 9.5 9.0 9.2 9.0   MAGNESIUM mg/dL 2.7  --  2.2 2.1  --    PHOSPHORUS mg/dL 2.8 3.0 2.3* 3.7  --

## 2024-03-20 NOTE — CASE MANAGEMENT
Case Management Discharge Planning Note    Patient name DEANNA Ashraf  Location [facility] 726/[facility] 726-01 MRN [id number]  : 1990 Date 3/20/2024       Current Admission Date: 3/18/2024  Current Admission Diagnosis:Cerebrovascular accident (CVA) due to occlusion of left posterior cerebral artery (HCC)   Patient Active Problem List    Diagnosis Date Noted    Aneurysm of descending thoracic aorta without rupture (HCC) 2024    Polycystic kidney disease 2024    Right hemispheric stroke 2024    Cerebrovascular accident (CVA) due to occlusion of left posterior cerebral artery (HCC) 2024    Left-sided weakness 2024    Aneurysm of anterior communicating artery 2024    Methadone use 2024    Hypertensive emergency 2024      LOS (days): 2  Geometric Mean LOS (GMLOS) (days):   Days to GMLOS:     OBJECTIVE:  Risk of Unplanned Readmission Score: 15.82         Current admission status: Inpatient   Preferred Pharmacy:   [facility] [place] [address] [facility] [facility]  [address] [facility] [address]  Phone: [phone] Fax: [phone]    Primary Care Provider: No primary care provider on file.    Primary Insurance: [employer]  Secondary Insurance:     DISCHARGE DETAILS:      DME Referral Provided  Referral made for DME?: Yes  DME referral completed for the following items:: Walker  DME Supplier Name:: [email or web address]         Additional Comments: CM spoke with pt, spouse and mother bedside. Pt would like OP PT/OT he doesn't want to go to a rehab. Pt will contiune OP with [facility], they are aware he is in the hospital. Pt mother is in the home and would be with him, the spouse works less than 5 minutes away and has a flexible schedule. CM will order a walker and have it delivered to the room

## 2024-03-20 NOTE — PLAN OF CARE
Problem: PHYSICAL THERAPY ADULT  Goal: Performs mobility at highest level of function for planned discharge setting.  See evaluation for individualized goals.  Description: Treatment/Interventions: Functional transfer training, LE strengthening/ROM, Endurance training, Therapeutic exercise, Elevations, Bed mobility, Gait training, Equipment eval/education, OT, Spoke to nursing          See flowsheet documentation for full assessment, interventions and recommendations.  Outcome: Progressing  Note: Prognosis: Good  Problem List: Decreased strength, Decreased endurance, Impaired balance, Decreased mobility, Decreased coordination, Pain  Assessment: Pt seen for PT treatment session this date. Therapy session focused on gait training and balance training in order to improve overall mobility and independence. Pt requires min A for STS, mod A for ambulation w/ and w/o RW. Pt ambulates with ataxic gait, decreased BL foot clearance and poor balance recovery requiring mod Ax1 to correct multiple LOB. Recommend RW for all OOB mobility at this time, although this is below pt baseline level of mobility. Tinetti balance assessment conducted w/ pt scoring 14/28, making pt a high falls risk. Pt making good progress toward goals. Pt was left supine at the end of PT session with all needs in reach. Pt would benefit from continued PT services while in hospital to address remaining limitations. Pt is currently at an increased falls risk and is functioning far below baseline level of mobility. PT to continue treating pt and recommends acute rehab. The patient's AM-PAC Basic Mobility Inpatient Short Form Raw Score is 15. A Raw score of less than or equal to 16 suggests the patient may benefit from discharge to post-acute rehabilitation services. Please also refer to the recommendation of the Physical Therapist for safe discharge planning.        Rehab Resource Intensity Level, PT: I (Maximum Resource Intensity)    See flowsheet  documentation for full assessment.

## 2024-03-20 NOTE — QUICK NOTE
Contacted by SOD at 00:26 regarding concerns for mod-severe headache, non-responsive to Tylenol and Gabapentin. Described as 7/10 R frontal pressure headache with photophobia since 1900. Initially c/f migraine.    Recent MRI brain done tonight for evaluation for possible acute CVA, given c/f L PCA occlusion.  Discussed MRI imaging with neurology on-call attending, Dr. Markell Merritt.  Critical concern for 4th ventricle effacement with hydrocephalus.  HA possibly associated with increased ICP.    Recommendations:  Transfer to [facility] for closer monitoring  3% HTS with Na goal 145-155, currently Na lvl 137  Repeat CTH in 6 hours  Avoid Triptans, NSAIDs, and opiates for pain mgt  Would recommend avoiding Zofran and Reglan, given proolonged Qtc 540 on 3/18/24. Can consider repeat EKG to recheck Qtc, but would opt for Tigan for now if nauseous.  Consider IV Mg.  Neurology will continue to follow  Please reach out with any questions or concerns

## 2024-03-20 NOTE — PLAN OF CARE
Problem: PAIN - ADULT  Goal: Verbalizes/displays adequate comfort level or baseline comfort level  Description: Interventions:  - Encourage patient to monitor pain and request assistance  - Assess pain using appropriate pain scale  - Administer analgesics based on type and severity of pain and evaluate response  - Implement non-pharmacological measures as appropriate and evaluate response  - Consider cultural and social influences on pain and pain management  - Notify physician/advanced practitioner if interventions unsuccessful or patient reports new pain  Outcome: Progressing     Problem: INFECTION - ADULT  Goal: Absence or prevention of progression during hospitalization  Description: INTERVENTIONS:  - Assess and monitor for signs and symptoms of infection  - Monitor lab/diagnostic results  - Monitor all insertion sites, i.e. indwelling lines, tubes, and drains  - Monitor endotracheal if appropriate and nasal secretions for changes in amount and color  - [place] appropriate cooling/warming therapies per order  - Administer medications as ordered  - Instruct and encourage patient and family to use good hand hygiene technique  - Identify and instruct in appropriate isolation precautions for identified infection/condition  Outcome: Progressing  Goal: Absence of fever/infection during neutropenic period  Description: INTERVENTIONS:  - Monitor WBC    Outcome: Progressing     Problem: SAFETY ADULT  Goal: Patient will remain free of falls  Description: INTERVENTIONS:  - Educate patient/family on patient safety including physical limitations  - Instruct patient to call for assistance with activity   - Consult OT/PT to assist with strengthening/mobility   - Keep Call bell within reach  - Keep bed low and locked with side rails adjusted as appropriate  - Keep care items and personal belongings within reach  - Initiate and maintain comfort rounds  - Make Fall Risk Sign visible to staff  - Offer Toileting every 2 Hours,  in advance of need  - Initiate/Maintain bed alarm  - Obtain necessary fall risk management equipment: non skid footwear  - Apply yellow socks and bracelet for high fall risk patients  - Consider moving patient to room near nurses station  Outcome: Progressing  Goal: Maintain or return to baseline ADL function  Description: INTERVENTIONS:  -  Assess patient's ability to carry out ADLs; assess patient's baseline for ADL function and identify physical deficits which impact ability to perform ADLs (bathing, care of mouth/teeth, toileting, grooming, dressing, etc.)  - Assess/evaluate cause of self-care deficits   - Assess range of motion  - Assess patient's mobility; develop plan if impaired  - Assess patient's need for assistive devices and provide as appropriate  - Encourage maximum independence but intervene and supervise when necessary  - Involve family in performance of ADLs  - Assess for home care needs following discharge   - Consider OT consult to assist with ADL evaluation and planning for discharge  - Provide patient education as appropriate  Outcome: Progressing  Goal: Maintains/Returns to pre admission functional level  Description: INTERVENTIONS:  - Perform AM-PAC 6 Click Basic Mobility/ Daily Activity assessment daily.  - Set and communicate daily mobility goal to care team and patient/family/caregiver.   - Collaborate with rehabilitation services on mobility goals if consulted  - Perform Range of Motion 3 times a day.  - Reposition patient every 2 hours.  - Dangle patient 3 times a day  - Stand patient 3 times a day  - Ambulate patient 3 times a day  - Out of bed to chair 3 times a day   - Out of bed for meals 3 times a day  - Out of bed for toileting  - Record patient progress and toleration of activity level   Outcome: Progressing     Problem: DISCHARGE PLANNING  Goal: Discharge to home or other facility with appropriate resources  Description: INTERVENTIONS:  - Identify barriers to discharge w/patient  and caregiver  - Arrange for needed discharge resources and transportation as appropriate  - Identify discharge learning needs (meds, wound care, etc.)  - Arrange for interpretive services to assist at discharge as needed  - Refer to Case Management Department for coordinating discharge planning if the patient needs post-hospital services based on physician/advanced practitioner order or complex needs related to functional status, cognitive ability, or social support system  Outcome: Progressing     Problem: Knowledge Deficit  Goal: Patient/family/caregiver demonstrates understanding of disease process, treatment plan, medications, and discharge instructions  Description: Complete learning assessment and assess knowledge base.  Interventions:  - Provide teaching at level of understanding  - Provide teaching via preferred learning methods  Outcome: Progressing     Problem: Neurological Deficit  Goal: Neurological status is stable or improving  Description: Interventions:  - Monitor and assess patient's level of consciousness, motor function, sensory function, and level of assistance needed for ADLs.   - Monitor and report changes from baseline. Collaborate with interdisciplinary team to initiate plan and implement interventions as ordered.   - Provide and maintain a safe environment.  - Consider seizure precautions.  - Consider fall precautions.  - Consider aspiration precautions.  - Consider bleeding precautions.  Outcome: Progressing     Problem: Activity Intolerance/Impaired Mobility  Goal: Mobility/activity is maintained at optimum level for patient  Description: Interventions:  - Assess and monitor patient  barriers to mobility and need for assistive/adaptive devices.  - Assess patient's emotional response to limitations.  - Collaborate with interdisciplinary team and initiate plans and interventions as ordered.  - Encourage independent activity per ability.  - Maintain proper body alignment.  - Perform  active/passive rom as tolerated/ordered.  - Plan activities to conserve energy.  - Turn patient as appropriate  Outcome: Progressing     Problem: Communication Impairment  Goal: Ability to express needs and understand communication  Description: Assess patient's communication skills and ability to understand information.  Patient will demonstrate use of effective communication techniques, alternative methods of communication and understanding even if not able to speak.     - Encourage communication and provide alternate methods of communication as needed.  - Collaborate with case management/ for discharge needs.  - Include patient/family/caregiver in decisions related to communication.  Outcome: Progressing     Problem: Potential for Aspiration  Goal: Non-ventilated patient's risk of aspiration is minimized  Description: Assess and monitor vital signs, respiratory status, and labs (WBC).  Monitor for signs of aspiration (tachypnea, cough, rales, wheezing, cyanosis, fever).    - Assess and monitor patient's ability to swallow.  - Place patient up in chair to eat if possible.  - HOB up at 90 degrees to eat if unable to get patient up into chair.  - Supervise patient during oral intake.   - Instruct patient/ family to take small bites.  - Instruct patient/ family to take small single sips when taking liquids.  - Follow patient-specific strategies generated by speech pathologist.  Outcome: Progressing     Problem: Nutrition  Goal: Nutrition/Hydration status is improving  Description: Monitor and assess patient's nutrition/hydration status for malnutrition (ex- brittle hair, bruises, dry skin, pale skin and conjunctiva, muscle wasting, smooth red tongue, and disorientation). Collaborate with interdisciplinary team and initiate plan and interventions as ordered.  Monitor patient's weight and dietary intake as ordered or per policy. Utilize nutrition screening tool and intervene per policy. Determine  patient's food preferences and provide high-protein, high-caloric foods as appropriate.     - Assist patient with eating.  - Allow adequate time for meals.  - Encourage patient to take dietary supplement as ordered.  - Collaborate with clinical nutritionist.  - Include patient/family/caregiver in decisions related to nutrition.  Outcome: Progressing     Problem: Prexisting or High Potential for Compromised Skin Integrity  Goal: Skin integrity is maintained or improved  Description: INTERVENTIONS:  - Identify patients at risk for skin breakdown  - Assess and monitor skin integrity  - Assess and monitor nutrition and hydration status  - Monitor labs   - Assess for incontinence   - Turn and reposition patient  - Assist with mobility/ambulation  - Relieve pressure over bony prominences  - Avoid friction and shearing  - Provide appropriate hygiene as needed including keeping skin clean and dry  - Evaluate need for skin moisturizer/barrier cream  - Collaborate with interdisciplinary team   - Patient/family teaching  - Consider wound care consult   Outcome: Progressing     Problem: Nutrition/Hydration-ADULT  Goal: Nutrient/Hydration intake appropriate for improving, restoring or maintaining nutritional needs  Description: Monitor and assess patient's nutrition/hydration status for malnutrition. Collaborate with interdisciplinary team and initiate plan and interventions as ordered.  Monitor patient's weight and dietary intake as ordered or per policy. Utilize nutrition screening tool and intervene as necessary. Determine patient's food preferences and provide high-protein, high-caloric foods as appropriate.     INTERVENTIONS:  - Monitor oral intake, urinary output, labs, and treatment plans  - Assess nutrition and hydration status and recommend course of action  - Evaluate amount of meals eaten  - Assist patient with eating if necessary   - Allow adequate time for meals  - Recommend/ encourage appropriate diets, oral  nutritional supplements, and vitamin/mineral supplements  - Order, calculate, and assess calorie counts as needed  - Recommend, monitor, and adjust tube feedings and TPN/PPN based on assessed needs  - Assess need for intravenous fluids  - Provide specific nutrition/hydration education as appropriate  - Include patient/family/caregiver in decisions related to nutrition  Outcome: Progressing

## 2024-03-20 NOTE — PROGRESS NOTES
"[facility]  Progress Note  Name: DEANNA Asrhaf I  MRN: [id number]  [facility] Date of Admission: 3/18/2024   Date of Service: 3/20/2024 I Hospital Day: 2    Assessment/Plan   * Cerebrovascular accident (CVA) due to occlusion of left posterior cerebral artery (HCC)  Assessment & Plan  Diagnosis: Left PCA occlusion   Patient presented with left-sided weakness/numbness, unsteady gait, 10/10 headache, and transient right-sided vision loss (resolved within minutes). /119 on arrival.  NIHSS 5 (left facial droop, LUE/LLE drift, LUE/LLE sensory loss, mild/moderate dysarthria). CT head showed chronic right basal ganglia lacunar infarct.  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  Patient was loaded with aspirin 324 mg once, started on Cardene drip, and transferred to [facility] for possible neurosurgery intervention.  Plan:  Repeat thrombosis panel 6 weeks  Migraine cocktail as needed (AVOID using NSAIDS, Triptans, steroids and DHE)   Continue ASA 81mg and Lipitor 40mg daily, started on Plavix for 21 days of DAPT then ASA monotherapy  [place] bubble study ordered  Coreg 12.5mg BID, lisinopril 5mg, hydralazine 5mg PRN, and labetalol PRN currently ordered for BP control.  Systolic blood pressure goal 120-140 \"normotensive\"  Neurology following   Will require zio patch  Follow up on rehab needs  No NSGY intervention, PCA chronic with collaterals present    Polycystic kidney disease  Assessment & Plan  Diagnosis: Polycystic Kidney Disease w/positive family history  Patient with bilateral renal cyst. 3/19 Renal US: Left renal artery  60 - 99% stenosis was identified in the tortuous main renal artery. Patent left renal vein. Normal R renal artery/vein.   Plan:   -Nephrology " "following, have discussed with neurology that goal blood pressure should be normotension especially with concurrent fusiform descending aortic aneurysm  - f/u aldosterone/renin, metanephrine levels  - added lisinopril 5mg to BP regimen    Hypertensive emergency  Assessment & Plan  Resolved. See plan above in \"CVA\"     Methadone use  Assessment & Plan  Diagnosis: Substance use disorder  Home medication: methadone. Methadone clinic: [facility] in [place], [place]; confirmed dose with them. Pt denies illicit drug use. UDS on 3/17 +methadone only.  Plan:  Monitor for s/s of withdrawal  105 morning dose (oral solution), 120 night dose (tablets)    Aneurysm of anterior communicating artery  Assessment & Plan  See plan above in \"CVA\"    Left-sided weakness  Assessment & Plan  See above \"Left PCA occlusion\"      - PT/OT following patient. Recommending post-acute rehab once discharged.               VTE Pharmacologic Prophylaxis:   High Risk (Score >/= 5) - Pharmacological DVT Prophylaxis Ordered: enoxaparin (Lovenox). Sequential Compression Devices Ordered.    Mobility:   Basic Mobility Inpatient Raw Score: 17  JH-HLM Goal: 5: Stand one or more mins  JH-HLM Achieved: 5: Stand (1 or more minutes)  JH-HLM Goal NOT achieved. Continue with multidisciplinary rounding and encourage appropriate mobility to improve upon JH-HLM goals.    Patient Centered Rounds: I performed bedside rounds with nursing staff today.  Discussions with Specialists or Other Care Team Provider: Neurology    Education and Discussions with Family / Patient: Patient declined call to .     Current Length of Stay: 2 day(s)  Current Patient Status: Inpatient   Discharge Plan: Anticipate discharge in 24-48 hrs to discharge location to be determined pending rehab evaluations.    Code Status: Level 1 - Full Code    Subjective:   No acute overnight events. Patient confirms no further issues with vision or headache. He feels continued " improvement with facial droop, slurred speech, and left arm/leg weakness. He has not walked yet. He is open to smoking cessation.    Objective:     Vitals:   Temp (24hrs), Av.6 °F (37 °C), Min:98.1 °F (36.7 °C), Max:99.4 °F (37.4 °C)    Temp:  [98.1 °F (36.7 °C)-99.4 °F (37.4 °C)] 98.6 °F (37 °C)  HR:  [55-88] 67  Resp:  [16-19] 16  BP: (131-224)/() 172/92  SpO2:  [97 %-99 %] 98 %  Body mass index is 26.7 kg/m².     Input and Output Summary (last 24 hours):     Intake/Output Summary (Last 24 hours) at 3/20/2024 1108  Last data filed at 3/20/2024 0802  Gross per 24 hour   Intake 360 ml   Output 400 ml   Net -40 ml       Physical Exam:   Physical Exam  HENT:      Head: Normocephalic and atraumatic.   Eyes:      Extraocular Movements: Extraocular movements intact.      Conjunctiva/sclera: Conjunctivae normal.      Pupils: Pupils are equal, round, and reactive to light.   Cardiovascular:      Rate and Rhythm: Normal rate and regular rhythm.      Heart sounds: Normal heart sounds. No murmur heard.  Pulmonary:      Effort: Pulmonary effort is normal.      Breath sounds: Normal breath sounds.   Abdominal:      General: There is no distension.      Tenderness: There is no abdominal tenderness. There is no guarding.   Musculoskeletal:      Right lower leg: No edema.      Left lower leg: No edema.   Skin:     Coloration: Skin is not jaundiced or pale.   Neurological:      Mental Status: He is alert and oriented to person, place, and time.      Cranial Nerves: Cranial nerve deficit present.      Sensory: No sensory deficit.      Motor: Weakness present.      Coordination: Coordination normal.      Comments: Slight slurred speech, left facial droop, vision intact, LUE and LLE 4/5 and RUE and RLE 5/5 strength, no sensory deficit, coordination intact, left pronator drift   Psychiatric:         Mood and Affect: Mood normal.          Additional Data:     Labs:  Results from last 7 days   Lab Units 24  0654  03/19/24  0509   WBC Thousand/uL 6.25 7.25   HEMOGLOBIN g/dL 12.8 12.7   HEMATOCRIT % 38.7 38.7   PLATELETS Thousands/uL 175 169   NEUTROS PCT %  --  49   LYMPHS PCT %  --  40   MONOS PCT %  --  8   EOS PCT %  --  2     Results from last 7 days   Lab Units 03/20/24  0654 03/19/24  1417 03/19/24  0509   SODIUM mmol/L 135   < > 139   POTASSIUM mmol/L 5.1   < > 3.4*   CHLORIDE mmol/L 103   < > 104   CO2 mmol/L 24   < > 26   BUN mg/dL 23   < > 19   CREATININE mg/dL 1.16   < > 0.90   ANION GAP mmol/L 8   < > 9   CALCIUM mg/dL 9.1   < > 9.0   ALBUMIN g/dL  --   --  4.0   TOTAL BILIRUBIN mg/dL  --   --  0.51   ALK PHOS U/L  --   --  58   ALT U/L  --   --  16   AST U/L  --   --  17   GLUCOSE RANDOM mg/dL 99   < > 94    < > = values in this interval not displayed.     Results from last 7 days   Lab Units 03/17/24  1226   INR  1.10     Results from last 7 days   Lab Units 03/17/24  1211   POC GLUCOSE mg/dl 116     Results from last 7 days   Lab Units 03/18/24  0501   HEMOGLOBIN A1C % 5.4     Results from last 7 days   Lab Units 03/17/24  1557   LACTIC ACID mmol/L 1.3       Lines/Drains:  Invasive Devices       Peripheral Intravenous Line  Duration             Peripheral IV 03/17/24 Left Antecubital 2 days    Peripheral IV 03/17/24 Right Antecubital 2 days                          Imaging: Reviewed radiology reports from this admission including: chest CT scan, CT head, MRI brain, and ECHO    Recent Cultures (last 7 days):         Last 24 Hours Medication List:   Current Facility-Administered Medications   Medication Dose Route Frequency Provider Last Rate    amLODIPine  10 mg Oral Daily Mitesh Burt MD      aspirin  81 mg Oral Daily Mitesh Burt MD      atorvastatin  40 mg Oral QPM Mitesh Burt MD      carvedilol  12.5 mg Oral BID With Meals Mitesh Burt MD      chlorhexidine  15 mL Mouth/Throat Q12H [facility] Mitesh Burt MD      clopidogrel  75 mg Oral Daily Betsy Angulo PA-C      enoxaparin  40 mg  Subcutaneous Daily Mitesh Burt MD      hydrALAZINE  5 mg Intravenous Q4H PRN Lisa Cuenca MD      labetalol  10 mg Intravenous Q4H PRN Lisa Cuenca MD      lisinopril  5 mg Oral Daily Mitesh Burt MD      melatonin  6 mg Oral HS Mitesh Burt MD      methadone  105 mg Oral Daily Mitesh Burt MD      methadone  120 mg Oral QPM Mitesh Burt MD      polyethylene glycol  17 g Oral Daily PRN Mitesh Burt MD      senna-docusate sodium  1 tablet Oral BID PRN Mitesh Burt MD          Today, Patient Was Seen By: Lisa Cuenca MD    **Please Note: This note may have been constructed using a voice recognition system.**

## 2024-03-20 NOTE — QUICK NOTE
Contacted by SOD at 00:26 regarding concerns for mod-severe headache, non-responsive to Tylenol and Gabapentin. Described as 7/10 R frontal pressure headache with photophobia since 1900. No other associated symptoms, such as nausea, or autonomic symptoms. Concerning for migraine.    Recent MRI brain done tonight, personally reviewed with neurology on-call attending, Dr. Merritt - appears concerning for acute-subacute R MCA stroke along lateral ventricle with ADC correlate.    Plan:  For migraine management:  IV acetaminophen 1000mg q8h - OK to switch to PRN once headache resolves  IV Mg 2g daily x 72 hrs  Nurtec 75mg x1 (can repeat in 24h if needed x 5 days, no more than 1 dose per 24h period)  Decadron 10mg x 1  Recommend against Zofran and Reglan, given prolonged Qtc 540 on 3/18/24 - can consider repeat EKG to recheck Qtc. Would opt for Tigan for now if nauseous.  Avoid Triptans, NSAIDs, opioids for pain  Would hold on IVFs for now given elevated BP (170s)  For stroke management:  Continue ASA 81mg daily  Continue Lipitor 40mg daily  Vascular surgery following  Neurology will follow with re-evaluation and possible further recs in the AM

## 2024-03-21 ENCOUNTER — APPOINTMENT (INPATIENT)
Dept: NON INVASIVE DIAGNOSTICS | Facility: HOSPITAL | Age: 34
DRG: 045 | End: 2024-03-21
Payer: COMMERCIAL

## 2024-03-21 PROBLEM — F17.200 SMOKING: Status: ACTIVE | Noted: 2024-03-21

## 2024-03-21 PROBLEM — I10 HYPERTENSION: Status: ACTIVE | Noted: 2024-03-21

## 2024-03-21 LAB
ANION GAP SERPL CALCULATED.3IONS-SCNC: 8 MMOL/L (ref 4–13)
AORTIC ROOT: 2.7 CM
ASCENDING AORTA: 2.9 CM
BUN SERPL-MCNC: 28 MG/DL (ref 5–25)
CALCIUM SERPL-MCNC: 9.1 MG/DL (ref 8.4–10.2)
CHLORIDE SERPL-SCNC: 105 MMOL/L (ref 96–108)
CO2 SERPL-SCNC: 27 MMOL/L (ref 21–32)
CREAT SERPL-MCNC: 1.08 MG/DL (ref 0.6–1.3)
D DIMER PPP FEU-MCNC: 0.79 UG/ML FEU
ERYTHROCYTE [DISTWIDTH] IN BLOOD BY AUTOMATED COUNT: 13.4 % (ref 11.6–15.1)
GFR SERPL CREATININE-BSD FRML MDRD: 88 ML/MIN/1.73SQ M
GLUCOSE SERPL-MCNC: 84 MG/DL (ref 65–140)
HCT VFR BLD AUTO: 39.1 % (ref 36.5–49.3)
HGB BLD-MCNC: 12.7 G/DL (ref 12–17)
MAGNESIUM SERPL-MCNC: 2.2 MG/DL (ref 1.9–2.7)
MCH RBC QN AUTO: 27.7 PG (ref 26.8–34.3)
MCHC RBC AUTO-ENTMCNC: 32.5 G/DL (ref 31.4–37.4)
MCV RBC AUTO: 85 FL (ref 82–98)
PLATELET # BLD AUTO: 186 THOUSANDS/UL (ref 149–390)
PMV BLD AUTO: 11.3 FL (ref 8.9–12.7)
POTASSIUM SERPL-SCNC: 3.8 MMOL/L (ref 3.5–5.3)
RBC # BLD AUTO: 4.58 MILLION/UL (ref 3.88–5.62)
SL CV LV EF: 60
SODIUM SERPL-SCNC: 140 MMOL/L (ref 135–147)
WBC # BLD AUTO: 8.07 THOUSAND/UL (ref 4.31–10.16)

## 2024-03-21 PROCEDURE — 93320 DOPPLER ECHO COMPLETE: CPT | Performed by: INTERNAL MEDICINE

## 2024-03-21 PROCEDURE — 99232 SBSQ HOSP IP/OBS MODERATE 35: CPT | Performed by: INTERNAL MEDICINE

## 2024-03-21 PROCEDURE — 83735 ASSAY OF MAGNESIUM: CPT

## 2024-03-21 PROCEDURE — 97116 GAIT TRAINING THERAPY: CPT

## 2024-03-21 PROCEDURE — 93312 ECHO TRANSESOPHAGEAL: CPT | Performed by: INTERNAL MEDICINE

## 2024-03-21 PROCEDURE — 93325 DOPPLER ECHO COLOR FLOW MAPG: CPT | Performed by: INTERNAL MEDICINE

## 2024-03-21 PROCEDURE — 85379 FIBRIN DEGRADATION QUANT: CPT | Performed by: PHYSICIAN ASSISTANT

## 2024-03-21 PROCEDURE — 99232 SBSQ HOSP IP/OBS MODERATE 35: CPT | Performed by: PSYCHIATRY & NEUROLOGY

## 2024-03-21 PROCEDURE — 97112 NEUROMUSCULAR REEDUCATION: CPT

## 2024-03-21 PROCEDURE — 97535 SELF CARE MNGMENT TRAINING: CPT

## 2024-03-21 PROCEDURE — 97530 THERAPEUTIC ACTIVITIES: CPT

## 2024-03-21 PROCEDURE — 93312 ECHO TRANSESOPHAGEAL: CPT

## 2024-03-21 PROCEDURE — 97129 THER IVNTJ 1ST 15 MIN: CPT

## 2024-03-21 PROCEDURE — 80048 BASIC METABOLIC PNL TOTAL CA: CPT

## 2024-03-21 PROCEDURE — 85027 COMPLETE CBC AUTOMATED: CPT

## 2024-03-21 RX ORDER — LISINOPRIL 10 MG/1
10 TABLET ORAL DAILY
Status: DISCONTINUED | OUTPATIENT
Start: 2024-03-21 | End: 2024-03-22

## 2024-03-21 RX ORDER — LIDOCAINE HYDROCHLORIDE 10 MG/ML
INJECTION, SOLUTION EPIDURAL; INFILTRATION; INTRACAUDAL; PERINEURAL AS NEEDED
Status: DISCONTINUED | OUTPATIENT
Start: 2024-03-21 | End: 2024-03-21

## 2024-03-21 RX ORDER — PROPOFOL 10 MG/ML
INJECTION, EMULSION INTRAVENOUS AS NEEDED
Status: DISCONTINUED | OUTPATIENT
Start: 2024-03-21 | End: 2024-03-21

## 2024-03-21 RX ORDER — SODIUM CHLORIDE 9 MG/ML
INJECTION, SOLUTION INTRAVENOUS CONTINUOUS PRN
Status: DISCONTINUED | OUTPATIENT
Start: 2024-03-21 | End: 2024-03-21

## 2024-03-21 RX ADMIN — PROPOFOL 100 MG: 10 INJECTION, EMULSION INTRAVENOUS at 10:06

## 2024-03-21 RX ADMIN — LABETALOL HYDROCHLORIDE 10 MG: 5 INJECTION, SOLUTION INTRAVENOUS at 20:18

## 2024-03-21 RX ADMIN — HYDRALAZINE HYDROCHLORIDE 5 MG: 20 INJECTION INTRAMUSCULAR; INTRAVENOUS at 12:27

## 2024-03-21 RX ADMIN — SODIUM CHLORIDE: 9 INJECTION, SOLUTION INTRAVENOUS at 10:02

## 2024-03-21 RX ADMIN — ENOXAPARIN SODIUM 40 MG: 40 INJECTION SUBCUTANEOUS at 12:28

## 2024-03-21 RX ADMIN — MELATONIN 6 MG: at 21:34

## 2024-03-21 RX ADMIN — CLOPIDOGREL BISULFATE 75 MG: 75 TABLET ORAL at 08:28

## 2024-03-21 RX ADMIN — PROPOFOL 120 MCG/KG/MIN: 10 INJECTION, EMULSION INTRAVENOUS at 10:07

## 2024-03-21 RX ADMIN — ACETAMINOPHEN 975 MG: 325 TABLET, FILM COATED ORAL at 07:36

## 2024-03-21 RX ADMIN — LISINOPRIL 10 MG: 10 TABLET ORAL at 08:28

## 2024-03-21 RX ADMIN — NICOTINE 21 MG: 21 PATCH, EXTENDED RELEASE TRANSDERMAL at 12:28

## 2024-03-21 RX ADMIN — ASPIRIN 81 MG CHEWABLE TABLET 81 MG: 81 TABLET CHEWABLE at 08:28

## 2024-03-21 RX ADMIN — ACETAMINOPHEN 975 MG: 325 TABLET, FILM COATED ORAL at 21:34

## 2024-03-21 RX ADMIN — CARVEDILOL 12.5 MG: 12.5 TABLET, FILM COATED ORAL at 08:28

## 2024-03-21 RX ADMIN — METHADONE HYDROCHLORIDE 120 MG: 10 TABLET ORAL at 17:07

## 2024-03-21 RX ADMIN — LIDOCAINE HYDROCHLORIDE 100 MG: 10 INJECTION, SOLUTION EPIDURAL; INFILTRATION; INTRACAUDAL; PERINEURAL at 10:06

## 2024-03-21 RX ADMIN — AMLODIPINE BESYLATE 10 MG: 10 TABLET ORAL at 08:28

## 2024-03-21 RX ADMIN — DIPHENHYDRAMINE HCL 25 MG: 25 TABLET ORAL at 21:34

## 2024-03-21 RX ADMIN — METHADONE HYDROCHLORIDE 105 MG: 10 CONCENTRATE ORAL at 08:28

## 2024-03-21 RX ADMIN — CARVEDILOL 12.5 MG: 12.5 TABLET, FILM COATED ORAL at 17:07

## 2024-03-21 RX ADMIN — ATORVASTATIN CALCIUM 40 MG: 40 TABLET, FILM COATED ORAL at 17:07

## 2024-03-21 NOTE — RESTORATIVE TECHNICIAN NOTE
Restorative Technician Note      Patient Name: DEANNA Ashraf     Note Type: Mobility  Patient Position Upon Consult: Supine  Activity Performed: Ambulated; Dangled; Stood  Assistive Device: Other (Comment) (Assist x1)  Education Provided: Yes  Patient Position at End of Consult: Supine; All needs within reach; Bed/Chair alarm activated    Tima GARVEY, Restorative Technician,

## 2024-03-21 NOTE — PROGRESS NOTES
INTERNAL MEDICINE RESIDENCY PROGRESS NOTE     Name: DEANNA Ashraf   Age & Sex: 33 y.o. male   MRN: [id number]  Unit/Bed#: [facility] 726-01   Encounter: [id number]  Team: SOD Team B     PATIENT INFORMATION     Name: DEANNA Ashraf   Age & Sex: 33 y.o. 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 /119 on arrival.  NIHSS 5 (left facial droop, LUE/LLE drift, LUE/LLE sensory loss, mild/moderate dysarthria). CT head showed chronic right basal ganglia lacunar infarct.  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  Patient was loaded with aspirin 324 mg once, started on Cardene drip, and transferred to [facility] for possible neurosurgery intervention.  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 3/19 Renal US: Left renal artery  60 - 99% stenosis was identified in the tortuous main renal artery. Patent left renal vein. Normal R renal artery/vein.   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 See plan above in \"CVA\"     Methadone use  Assessment & Plan  Diagnosis: Substance use disorder  Home medication: methadone.   Methadone clinic: [place] [facility] in [place], [place]; confirmed dose with them.   Pt denies illicit drug use.   UDS on 3/17 +methadone only.  Monitor for s/s of withdrawal  105 morning dose (oral solution), 120 night dose (tablets)    Aneurysm of anterior communicating artery  Assessment & Plan  Outpatient neurosurgery follow-up    Left-sided weakness  Assessment & Plan  YOLY ALCANTARA 4 out of 5  PT/OT following patient  Recommending post-acute rehab once discharged - pt would like o/p rehab      Disposition: Anticipate stable for discharge in 24 to 48 hours    SUBJECTIVE     Patient seen and examined. No acute events overnight.  Patient reports feeling well.  Continues to have deficits on " "his left side.  No chest pain shortness of breath vision changes.  Headache better controlled with the as needed Tylenol.  OBJECTIVE     Vitals:    24 0600 24 0725 24 1033 24 1219   BP:  163/99 (!) 174/93 168/88   Pulse:  62 67    Resp:  18  18   Temp:  98.8 °F (37.1 °C)     TempSrc:       SpO2:  98%     Weight: 85 kg (187 lb 6.3 oz)  84.8 kg (187 lb)    Height:   5' 9\" (1.753 m)       Temperature:   Temp (24hrs), Av °F (37.2 °C), Min:98.7 °F (37.1 °C), Max:99.6 °F (37.6 °C)    Temperature: 98.8 °F (37.1 °C)  Intake & Output:  I/O          07 07 0701   07 07 0700    P.O. 0 360 237    I.V. (mL/kg)   200 (2.4)    Total Intake(mL/kg) 0 (0) 360 (4.2) 437 (5.2)    Urine (mL/kg/hr)  1300 (0.6) 600 (1.2)    Total Output  1300 600    Net 0 -940 -163                 Weights:   IBW (Ideal Body Weight): 70.7 kg    Body mass index is 27.62 kg/m².  Weight (last 2 days)       Date/Time Weight    24 1033 84.8 (187)    24 0600 85 (187.39)    24 0534 82 (180.78)    24 0600 82.8 (182.6)          Physical Exam  Vitals and nursing note reviewed.   Constitutional:       General: He is not in acute distress.     Appearance: He is well-developed and normal weight.   HENT:      Head: Normocephalic and atraumatic.   Eyes:      Extraocular Movements: Extraocular movements intact.      Conjunctiva/sclera: Conjunctivae normal.      Pupils: Pupils are equal, round, and reactive to light.   Cardiovascular:      Rate and Rhythm: Normal rate and regular rhythm.      Heart sounds: No murmur heard.  Pulmonary:      Effort: Pulmonary effort is normal. No respiratory distress.      Breath sounds: Normal breath sounds.   Abdominal:      General: Abdomen is flat. Bowel sounds are normal.      Palpations: Abdomen is soft.      Tenderness: There is no abdominal tenderness.   Musculoskeletal:         General: No swelling.      Cervical back: Neck supple. "   Skin:     General: Skin is warm and dry.      Capillary Refill: Capillary refill takes less than 2 seconds.   Neurological:      Mental Status: He is alert and oriented to person, place, and time.      Motor: Weakness present.      Comments: Left Upper extremity 4 out of 5 left lower extremity 4 out of 5 left facial droop   Psychiatric:         Mood and Affect: Mood normal.       LABORATORY DATA     Labs: I have personally reviewed pertinent reports.  Results from last 7 days   Lab Units 03/21/24  0735 03/20/24  0654 03/19/24  0509 03/18/24  0501   WBC Thousand/uL 8.07 6.25 7.25 7.09   HEMOGLOBIN g/dL 12.7 12.8 12.7 13.8   HEMATOCRIT % 39.1 38.7 38.7 41.6   PLATELETS Thousands/uL 186 175 169 171   NEUTROS PCT %  --   --  49 76*   MONOS PCT %  --   --  8 6   EOS PCT %  --   --  2 0      Results from last 7 days   Lab Units 03/21/24  0735 03/20/24  0654 03/19/24  1417 03/19/24  0509 03/18/24  0501 03/17/24  1226   POTASSIUM mmol/L 3.8 5.1 3.8 3.4* 3.5 3.7   CHLORIDE mmol/L 105 103 104 104 100 101   CO2 mmol/L 27 24 26 26 25 28   BUN mg/dL 28* 23 19 19 18 17   CREATININE mg/dL 1.08 1.16 0.88 0.90 1.07 1.08   CALCIUM mg/dL 9.1 9.1 9.5 9.0 9.2 9.0   ALK PHOS U/L  --   --   --  58 66 54   ALT U/L  --   --   --  16 17 15   AST U/L  --   --   --  17 17 13     Results from last 7 days   Lab Units 03/21/24  0735 03/20/24  0654 03/19/24  0509   MAGNESIUM mg/dL 2.2 2.7 2.2     Results from last 7 days   Lab Units 03/20/24  0654 03/19/24  1417 03/19/24  0509   PHOSPHORUS mg/dL 2.8 3.0 2.3*      Results from last 7 days   Lab Units 03/17/24  1226   INR  1.10   PTT seconds 21*     Results from last 7 days   Lab Units 03/17/24  1557   LACTIC ACID mmol/L 1.3           IMAGING & DIAGNOSTIC TESTING     Radiology Results: I have personally reviewed pertinent reports.  MRI brain w wo contrast    Result Date: 3/20/2024  Impression: MRI brain - Acute infarct in right posterior caudate extending into right posterior limb of internal  capsule. - No vessel wall thickening or vessel wall enhancement to suggest active vasculitis. - Chronic infarct in posterior putamen extending into right external capsule with hemosiderin deposition, likely sequelae of hemorrhagic infarct. - Chronic lacunar infarcts in bilateral roberto. - Multiple chronic microhemorrhages in bilateral roberto and to a lesser extent in right caudate, left putamen, and bilateral cerebellum. Findings suggestive of hypertensive arteriopathy. MRA head - Probable chronic occlusion of proximal left PCA with faint flow-related signal more distally possibly due to collateral vessels. Moderate-to-severe short segment narrowing in bilateral MCA M2 branch vessels. Findings raise possibility of vasculitis over atherosclerotic disease given patient's age. Consider cerebral angiography for further evaluation. - Unchanged 0.2 cm anterior communicating artery aneurysm. The study was marked in EPIC for immediate notification. Workstation performed: [id number]     CTA dissection protocol chest/abdomen/pelvis    Result Date: 3/18/2024  Impression: 5.0 cm fusiform aneurysm of the descending aorta at the level of the hiatus. No evidence of dissection. Consider vascular surgery referral. Multiple small bilateral renal cysts. The study was marked in EPIC for immediate notification. Workstation performed: [id number]     Other Diagnostic Testing: I have personally reviewed pertinent reports.    ACTIVE MEDICATIONS     Current Facility-Administered Medications   Medication Dose Route Frequency    acetaminophen (TYLENOL) tablet 975 mg  975 mg Oral Q8H ARYA    amLODIPine (NORVASC) tablet 10 mg  10 mg Oral Daily    aspirin chewable tablet 81 mg  81 mg Oral Daily    atorvastatin (LIPITOR) tablet 40 mg  40 mg Oral QPM    carvedilol (COREG) tablet 12.5 mg  12.5 mg Oral BID With Meals    chlorhexidine (PERIDEX) 0.12 % oral rinse 15 mL  15 mL Mouth/Throat Q12H [facility]    clopidogrel (PLAVIX) tablet 75 mg  75 mg Oral Daily     "diphenhydrAMINE (BENADRYL) tablet 25 mg  25 mg Oral HS PRN    enoxaparin (LOVENOX) subcutaneous injection 40 mg  40 mg Subcutaneous Daily    hydrALAZINE (APRESOLINE) injection 5 mg  5 mg Intravenous Q4H PRN    labetalol (NORMODYNE) injection 10 mg  10 mg Intravenous Q4H PRN    lisinopril (ZESTRIL) tablet 10 mg  10 mg Oral Daily    melatonin tablet 6 mg  6 mg Oral HS    methadone (DOLOPHINE) oral concentrated solution 105 mg  105 mg Oral Daily    methadone (DOLOPHINE) tablet 120 mg  120 mg Oral QPM    nicotine (NICODERM CQ) 21 mg/24 hr TD 24 hr patch 21 mg  21 mg Transdermal Daily    polyethylene glycol (MIRALAX) packet 17 g  17 g Oral Daily PRN    senna-docusate sodium (SENOKOT S) 8.6-50 mg per tablet 1 tablet  1 tablet Oral BID PRN       VTE Pharmacologic Prophylaxis: AllergyEnoxaparin (Lovenox)  VTE Mechanical Prophylaxis: sequential compression device    Portions of the record may have been created with voice recognition software.  Occasional wrong word or \"sound a like\" substitutions may have occurred due to the inherent limitations of voice recognition software.  Read the chart carefully and recognize, using context, where substitutions have occurred.  ==  Tanesha Rose DO  [facility]  Internal Medicine Residency PGY-3  "

## 2024-03-21 NOTE — OCCUPATIONAL THERAPY NOTE
Occupational Therapy Progress Note     Patient Name: DEANNA Ashraf  Today's Date: 3/21/2024  Problem List  Principal Problem:    Cerebrovascular accident (CVA) due to occlusion of left posterior cerebral artery (HCC)  Active Problems:    Left-sided weakness    Aneurysm of anterior communicating artery    Methadone use    Hypertensive emergency    Right hemispheric stroke    Polycystic kidney disease    Aneurysm of descending thoracic aorta without rupture (HCC)    Smoking          03/21/24 1146   OT Last Visit   OT Visit Date 03/21/24   Note Type   Note Type Treatment   Pain Assessment   Pain Assessment Tool 0-10   Pain Score No Pain   Restrictions/Precautions   Weight Bearing Precautions Per Order No   Other Precautions Chair Alarm;Bed Alarm;Fall Risk  (L-sided weakness, ataxic movements with fnxl mobility)   Lifestyle   Autonomy I with ADLs/IADLs   Reciprocal Relationships lives with parents, spouse, provides care for mother   Service to Others unemployed   ADL   Where Assessed Supine, bed   LB Dressing Assistance 5  Supervision/Setup   LB Dressing Deficit Don/doff R sock;Don/doff L sock;Thread RLE into pants;Thread LLE into pants;Pull up over hips   Bed Mobility   Supine to Sit 6  Modified independent   Additional items Bedrails   Sit to Supine 6  Modified independent   Additional items Bedrails   Transfers   Sit to Stand 5  Supervision   Additional items Increased time required   Stand to Sit 5  Supervision   Additional items Increased time required   Additional Comments transfers w/ RW   Functional Mobility   Functional Mobility   (CGA)   Additional Comments ataxic movements LLE   Additional items Rolling walker   Therapeutic Excerise-Strength   UE Strength Yes   Left Upper Extremity-Strength   L Shoulder Flexion;Extension   L Elbow Elbow flexion;Elbow extension  (bicep + tricep exercises)   L Wrist Wrist flexion;Wrist extension   L Position Seated   Equipment Dumbell  (2 lbs)   L Weights/Reps/Sets x30  "  Coordination   Fine Motor Engaged in FMC tasks involving grasping blocks, incoporating different pinches, finger opposition, palm to finger translation, dexterity with LUE   Subjective   Subjective \"much better\"   Cognition   Overall Cognitive Status WFL   Arousal/Participation Responsive;Cooperative   Attention Attends with cues to redirect   Orientation Level Oriented X4   Following Commands Follows one step commands with increased time or repetition   Comments Pt very pleasant and cooperative t/o session   Cognition Assessment Tools MOCA   Score 27   Activity Tolerance   Activity Tolerance Patient tolerated treatment well   Medical Staff Made Aware RN wilfrid for session   Assessment   Assessment Patient participated in Skilled OT session this date with interventions consisting of ADL re training with the use of correct body mechnaics, safety awareness and fall prevention techniques, therapeutic exercise to: increase functional use of BUEs, increase BUE muscle strength ,  therapeutic activities to: increase activity tolerance, increase dynamic sit/ stand balance during functional activity , and increase OOB/ sitting tolerance .Upon arrival patient was found supine in bed. Pt demonstrated the following tasks: MI sup <> sit, S STS, and CGA fnxl mobility with RW, S for LBD. Pt engaged in LUE therex and FMC tasks with good tolerance. See flowsheet for more details. Pt also participated in [place] Cognitive Assessment and scored 27/30 indicating normal cognitive functioning. Please see further details regarding score below. Patient continues to be functioning below baseline level, occupational performance remains limited secondary to factors listed above and increased risk for falls and injury.   From OT standpoint, recommendation at time of d/c would STR vs home with OPOT and increased social support - pending progress. Patient to benefit from continued Occupational Therapy treatment while in the hospital to " address deficits as defined above and maximize level of functional independence with ADLs and functional mobility. Pt was left after session with alarm on and all current needs met. The patient's raw score on the AM-PAC Daily Activity Inpatient Short Form is 20. A raw score of greater than or equal to 19 suggests the patient may benefit from discharge to home. Please refer to the recommendation of the Occupational Therapist for safe discharge planning.   Plan   Treatment Interventions ADL retraining;Functional transfer training;UE strengthening/ROM;Endurance training;Cognitive reorientation;Patient/family training;Compensatory technique education;Fine motor coordination activities;Continued evaluation;Energy conservation;Activityengagement   Goal Expiration Date 04/01/24   OT Treatment Day 1   OT Frequency 2-3x/wk   Discharge Recommendation   Rehab Resource Intensity Level, OT I (Maximum Resource Intensity)  (vs home with OPOT - pending progress)   AM-PAC Daily Activity Inpatient   Lower Body Dressing 3   Bathing 3   Toileting 3   Upper Body Dressing 3   Grooming 4   Eating 4   Daily Activity Raw Score 20   Daily Activity Standardized Score (Calc for Raw Score >=11) 42.03   AM-PAC Applied Cognition Inpatient   Following a Speech/Presentation 3   Understanding Ordinary Conversation 4   Taking Medications 4   Remembering Where Things Are Placed or Put Away 3   Remembering List of 4-5 Errands 3   Taking Care of Complicated Tasks 3   Applied Cognition Raw Score 20   Applied Cognition Standardized Score 41.76   MOCA   Version 8.1   Visuopatial/Executive 4   Naming 3   Memory 0   Attention: Digits 2   Attention: Letters 1   Attention: Serial 3   Language: Repeat 0   Language: Fluency 1   Abstraction 2   Delayed Recall 4   Orientation 6   Does patient have less than or equal to 12 years of education? 1   MOCA Total Score 27   MOCA Comments see details below       MOCA    PT SEEN FOR KAYLIE COGNITIVE ASSESSMENT.  SCORED  27/30 INDICATING NORMAL COGNITIVE FUNCTION FOR AGE/EDUCATION.  SCORES ARE AS FOLLOWS:    VISUOSPATIAL/EXECUTIVE FUNCTION: 4/5. Pt was able to complete trail. Pt was unable to copy cube. Pt was able to draw a clock, accurately place the numbers, and properly place the hands at ten past eleven.    NAMING: 3/3. Pt able to name 3/3 animals.    ATTENTION: 6/6. Pt was able to repeat sequence of numbers forwards and backwards. Pt able to attend to sequence of letters. Pt was able to correctly subtract 7 from 100 5/5 times.     LANGUAGE: 1/3. Pt was unable to repeat sentences back to therapist. Pt was able to produce 15 words starting with the letter F in 1 minute.     ABSTRACTION: 2/2. Pt was able to identify the similarity of two items x2 trials.    DELAYED RECALL: 4/5. Pt recalled 4/5 words without cues. Pt recalled 1/5 words with category cue.     ORIENTATION: 6/6. Pt is oriented to date, month, year, day, place and city.     Pt received additional point for having < 12 years of education.    Jolynn Dennis MS, OTR/L

## 2024-03-21 NOTE — CASE MANAGEMENT
Case Management Discharge Planning Note    Patient name DEANNA X Leiss  Location [facility] 726/[facility] 726-01 MRN [id number]  : 1990 Date 3/21/2024       Current Admission Date: 3/18/2024  Current Admission Diagnosis:Cerebrovascular accident (CVA) due to occlusion of left posterior cerebral artery (HCC)   Patient Active Problem List    Diagnosis Date Noted    Hypertension 2024    Aneurysm of descending thoracic aorta without rupture (HCC) 2024    Polycystic kidney disease 2024    Right hemispheric stroke 2024    Cerebrovascular accident (CVA) due to occlusion of left posterior cerebral artery (HCC) 2024    Left-sided weakness 2024    Aneurysm of anterior communicating artery 2024    Methadone use 2024    Hypertensive emergency 2024      LOS (days): 3  Geometric Mean LOS (GMLOS) (days):   Days to GMLOS:     OBJECTIVE:  Risk of Unplanned Readmission Score: 16.25         Current admission status: Inpatient   Preferred Pharmacy:   [facility] [address]  [address] [address]  Phone: [phone] Fax: [phone]    Primary Care Provider: No primary care provider on file.    Primary Insurance: [employer]  Secondary Insurance:     DISCHARGE DETAILS:             Additional Comments: Pt lienative DC on 3/22 per rounding provider

## 2024-03-21 NOTE — PLAN OF CARE
Problem: PAIN - ADULT  Goal: Verbalizes/displays adequate comfort level or baseline comfort level  Description: Interventions:  - Encourage patient to monitor pain and request assistance  - Assess pain using appropriate pain scale  - Administer analgesics based on type and severity of pain and evaluate response  - Implement non-pharmacological measures as appropriate and evaluate response  - Consider cultural and social influences on pain and pain management  - Notify physician/advanced practitioner if interventions unsuccessful or patient reports new pain  Outcome: Progressing     Problem: INFECTION - ADULT  Goal: Absence or prevention of progression during hospitalization  Description: INTERVENTIONS:  - Assess and monitor for signs and symptoms of infection  - Monitor lab/diagnostic results  - Monitor all insertion sites, i.e. indwelling lines, tubes, and drains  - Monitor endotracheal if appropriate and nasal secretions for changes in amount and color  - [place] appropriate cooling/warming therapies per order  - Administer medications as ordered  - Instruct and encourage patient and family to use good hand hygiene technique  - Identify and instruct in appropriate isolation precautions for identified infection/condition  Outcome: Progressing  Goal: Absence of fever/infection during neutropenic period  Description: INTERVENTIONS:  - Monitor WBC    Outcome: Progressing     Problem: SAFETY ADULT  Goal: Patient will remain free of falls  Description: INTERVENTIONS:  - Educate patient/family on patient safety including physical limitations  - Instruct patient to call for assistance with activity   - Consult OT/PT to assist with strengthening/mobility   - Keep Call bell within reach  - Keep bed low and locked with side rails adjusted as appropriate  - Keep care items and personal belongings within reach  - Initiate and maintain comfort rounds  - Make Fall Risk Sign visible to staff  - Offer Toileting every 2 Hours,  in advance of need  - Initiate/Maintain bed alarm  - Obtain necessary fall risk management equipment: non skid footwear  - Apply yellow socks and bracelet for high fall risk patients  - Consider moving patient to room near nurses station  Outcome: Progressing  Goal: Maintain or return to baseline ADL function  Description: INTERVENTIONS:  -  Assess patient's ability to carry out ADLs; assess patient's baseline for ADL function and identify physical deficits which impact ability to perform ADLs (bathing, care of mouth/teeth, toileting, grooming, dressing, etc.)  - Assess/evaluate cause of self-care deficits   - Assess range of motion  - Assess patient's mobility; develop plan if impaired  - Assess patient's need for assistive devices and provide as appropriate  - Encourage maximum independence but intervene and supervise when necessary  - Involve family in performance of ADLs  - Assess for home care needs following discharge   - Consider OT consult to assist with ADL evaluation and planning for discharge  - Provide patient education as appropriate  Outcome: Progressing  Goal: Maintains/Returns to pre admission functional level  Description: INTERVENTIONS:  - Perform AM-PAC 6 Click Basic Mobility/ Daily Activity assessment daily.  - Set and communicate daily mobility goal to care team and patient/family/caregiver.   - Collaborate with rehabilitation services on mobility goals if consulted  - Perform Range of Motion 3 times a day.  - Reposition patient every 2 hours.  - Dangle patient 3 times a day  - Stand patient 3 times a day  - Ambulate patient 3 times a day  - Out of bed to chair 3 times a day   - Out of bed for meals 3 times a day  - Out of bed for toileting  - Record patient progress and toleration of activity level   Outcome: Progressing     Problem: DISCHARGE PLANNING  Goal: Discharge to home or other facility with appropriate resources  Description: INTERVENTIONS:  - Identify barriers to discharge w/patient  and caregiver  - Arrange for needed discharge resources and transportation as appropriate  - Identify discharge learning needs (meds, wound care, etc.)  - Arrange for interpretive services to assist at discharge as needed  - Refer to Case Management Department for coordinating discharge planning if the patient needs post-hospital services based on physician/advanced practitioner order or complex needs related to functional status, cognitive ability, or social support system  Outcome: Progressing     Problem: Knowledge Deficit  Goal: Patient/family/caregiver demonstrates understanding of disease process, treatment plan, medications, and discharge instructions  Description: Complete learning assessment and assess knowledge base.  Interventions:  - Provide teaching at level of understanding  - Provide teaching via preferred learning methods  Outcome: Progressing     Problem: Neurological Deficit  Goal: Neurological status is stable or improving  Description: Interventions:  - Monitor and assess patient's level of consciousness, motor function, sensory function, and level of assistance needed for ADLs.   - Monitor and report changes from baseline. Collaborate with interdisciplinary team to initiate plan and implement interventions as ordered.   - Provide and maintain a safe environment.  - Consider seizure precautions.  - Consider fall precautions.  - Consider aspiration precautions.  - Consider bleeding precautions.  Outcome: Progressing     Problem: Activity Intolerance/Impaired Mobility  Goal: Mobility/activity is maintained at optimum level for patient  Description: Interventions:  - Assess and monitor patient  barriers to mobility and need for assistive/adaptive devices.  - Assess patient's emotional response to limitations.  - Collaborate with interdisciplinary team and initiate plans and interventions as ordered.  - Encourage independent activity per ability.  - Maintain proper body alignment.  - Perform  active/passive rom as tolerated/ordered.  - Plan activities to conserve energy.  - Turn patient as appropriate  Outcome: Progressing     Problem: Communication Impairment  Goal: Ability to express needs and understand communication  Description: Assess patient's communication skills and ability to understand information.  Patient will demonstrate use of effective communication techniques, alternative methods of communication and understanding even if not able to speak.     - Encourage communication and provide alternate methods of communication as needed.  - Collaborate with case management/ for discharge needs.  - Include patient/family/caregiver in decisions related to communication.  Outcome: Progressing     Problem: Potential for Aspiration  Goal: Non-ventilated patient's risk of aspiration is minimized  Description: Assess and monitor vital signs, respiratory status, and labs (WBC).  Monitor for signs of aspiration (tachypnea, cough, rales, wheezing, cyanosis, fever).    - Assess and monitor patient's ability to swallow.  - Place patient up in chair to eat if possible.  - HOB up at 90 degrees to eat if unable to get patient up into chair.  - Supervise patient during oral intake.   - Instruct patient/ family to take small bites.  - Instruct patient/ family to take small single sips when taking liquids.  - Follow patient-specific strategies generated by speech pathologist.  Outcome: Progressing     Problem: Nutrition  Goal: Nutrition/Hydration status is improving  Description: Monitor and assess patient's nutrition/hydration status for malnutrition (ex- brittle hair, bruises, dry skin, pale skin and conjunctiva, muscle wasting, smooth red tongue, and disorientation). Collaborate with interdisciplinary team and initiate plan and interventions as ordered.  Monitor patient's weight and dietary intake as ordered or per policy. Utilize nutrition screening tool and intervene per policy. Determine  patient's food preferences and provide high-protein, high-caloric foods as appropriate.     - Assist patient with eating.  - Allow adequate time for meals.  - Encourage patient to take dietary supplement as ordered.  - Collaborate with clinical nutritionist.  - Include patient/family/caregiver in decisions related to nutrition.  Outcome: Progressing     Problem: Prexisting or High Potential for Compromised Skin Integrity  Goal: Skin integrity is maintained or improved  Description: INTERVENTIONS:  - Identify patients at risk for skin breakdown  - Assess and monitor skin integrity  - Assess and monitor nutrition and hydration status  - Monitor labs   - Assess for incontinence   - Turn and reposition patient  - Assist with mobility/ambulation  - Relieve pressure over bony prominences  - Avoid friction and shearing  - Provide appropriate hygiene as needed including keeping skin clean and dry  - Evaluate need for skin moisturizer/barrier cream  - Collaborate with interdisciplinary team   - Patient/family teaching  - Consider wound care consult   Outcome: Progressing     Problem: Nutrition/Hydration-ADULT  Goal: Nutrient/Hydration intake appropriate for improving, restoring or maintaining nutritional needs  Description: Monitor and assess patient's nutrition/hydration status for malnutrition. Collaborate with interdisciplinary team and initiate plan and interventions as ordered.  Monitor patient's weight and dietary intake as ordered or per policy. Utilize nutrition screening tool and intervene as necessary. Determine patient's food preferences and provide high-protein, high-caloric foods as appropriate.     INTERVENTIONS:  - Monitor oral intake, urinary output, labs, and treatment plans  - Assess nutrition and hydration status and recommend course of action  - Evaluate amount of meals eaten  - Assist patient with eating if necessary   - Allow adequate time for meals  - Recommend/ encourage appropriate diets, oral  nutritional supplements, and vitamin/mineral supplements  - Order, calculate, and assess calorie counts as needed  - Recommend, monitor, and adjust tube feedings and TPN/PPN based on assessed needs  - Assess need for intravenous fluids  - Provide specific nutrition/hydration education as appropriate  - Include patient/family/caregiver in decisions related to nutrition  Outcome: Progressing

## 2024-03-21 NOTE — PROGRESS NOTES
"NEPHROLOGY PROGRESS NOTE   DEANNA Ashraf 33 y.o. male MRN: [id number]  Unit/Bed#: [facility] 726-01 Encounter: [id number]  Reason for Consult: Polycystic kidney disease, hypertensive emergency    ASSESSMENT/PLAN:  Polycystic kidney disease with positive family medical history (mother) noted bilateral renal cysts and cerebral aneurysm.  Hypertensive emergency with recent CVA recent MRI showing acute infarct in the right posterior caudate, chronic infarct of the posterior putamen and chronic lacunar infarcts in the bilateral roberto with noted multiple chronic microhemorrhages         Plasma metanephrines pending  Aldosterone/renin level pending  Noted abnormal renal Doppler suspicion for ESTELITA.  Adjust lisinopril to 10 mg daily.  Continue with amlodipine and carvedilol.  Suspicion for possible left renal artery stenosis with noted velocities at 528.  However previous CTA showed no evidence of flow-limiting atherosclerotic disease of the aorta or major branch vessels.  Hypokalemia, continue with replacement as needed.    Renal function stable with creatinine 1.08  Blood pressure still not optimally controlled recommend increasing lisinopril to 10 mg daily with slow titration  VALENTINA negative for embolic source.  No other changes from nephrology standpoint    SUBJECTIVE:  Seen and examined.  Patient awake alert.  Offers no new complaints.  Denies any chest pain shortness of breath.  No abdominal pain.    Review of Systems    OBJECTIVE:  Current Weight: Weight - Scale: 84.8 kg (187 lb)  Vitals:    03/21/24 0600 03/21/24 0725 03/21/24 1033 03/21/24 1219   BP:  163/99 (!) 174/93 168/88   Pulse:  62 67    Resp:  18  18   Temp:  98.8 °F (37.1 °C)     TempSrc:       SpO2:  98%     Weight: 85 kg (187 lb 6.3 oz)  84.8 kg (187 lb)    Height:   5' 9\" (1.753 m)        Intake/Output Summary (Last 24 hours) at 3/21/2024 1630  Last data filed at 3/21/2024 1219  Gross per 24 hour   Intake 437 ml   Output 1100 ml   Net -663 ml       Physical " Exam  Constitutional:       Appearance: He is not ill-appearing.   Eyes:      General: No scleral icterus.  Cardiovascular:      Rate and Rhythm: Normal rate and regular rhythm.   Pulmonary:      Effort: Pulmonary effort is normal.      Breath sounds: Normal breath sounds.   Abdominal:      General: There is no distension.      Palpations: Abdomen is soft.      Tenderness: There is no abdominal tenderness.   Musculoskeletal:      Right lower leg: No edema.      Left lower leg: No edema.   Skin:     General: Skin is warm and dry.      Findings: No rash.   Neurological:      Mental Status: He is alert and oriented to person, place, and time.         Medications:    Current Facility-Administered Medications:     acetaminophen (TYLENOL) tablet 975 mg, 975 mg, Oral, Q8H ARYA, Tanesha Rose DO, 975 mg at 03/21/24 0736    amLODIPine (NORVASC) tablet 10 mg, 10 mg, Oral, Daily, Mitesh Burt MD, 10 mg at 03/21/24 0828    aspirin chewable tablet 81 mg, 81 mg, Oral, Daily, Mitesh Burt MD, 81 mg at 03/21/24 0828    atorvastatin (LIPITOR) tablet 40 mg, 40 mg, Oral, QPM, Mitesh Burt MD, 40 mg at 03/20/24 1737    carvedilol (COREG) tablet 12.5 mg, 12.5 mg, Oral, BID With Meals, Mitesh Burt MD, 12.5 mg at 03/21/24 0828    chlorhexidine (PERIDEX) 0.12 % oral rinse 15 mL, 15 mL, Mouth/Throat, Q12H ARYA, Mitesh Burt MD, 15 mL at 03/20/24 2121    clopidogrel (PLAVIX) tablet 75 mg, 75 mg, Oral, Daily, Betsy Angulo PA-C, 75 mg at 03/21/24 0828    diphenhydrAMINE (BENADRYL) tablet 25 mg, 25 mg, Oral, HS PRN, William Ruvalcaba, DO, 25 mg at 03/20/24 2121    enoxaparin (LOVENOX) subcutaneous injection 40 mg, 40 mg, Subcutaneous, Daily, Mitesh Burt MD, 40 mg at 03/21/24 1228    hydrALAZINE (APRESOLINE) injection 5 mg, 5 mg, Intravenous, Q4H PRN, Lisa Cuenca MD, 5 mg at 03/21/24 1227    labetalol (NORMODYNE) injection 10 mg, 10 mg, Intravenous, Q4H PRN, Lisa Cuenca MD    lisinopril (ZESTRIL) tablet  10 mg, 10 mg, Oral, Daily, Gerardo Ho DO, 10 mg at 03/21/24 0828    melatonin tablet 6 mg, 6 mg, Oral, HS, Mitesh Burt MD, 6 mg at 03/20/24 2121    methadone (DOLOPHINE) oral concentrated solution 105 mg, 105 mg, Oral, Daily, Mitesh Burt MD, 105 mg at 03/21/24 0828    methadone (DOLOPHINE) tablet 120 mg, 120 mg, Oral, QPM, Mitesh Burt MD, 120 mg at 03/20/24 1737    nicotine (NICODERM CQ) 21 mg/24 hr TD 24 hr patch 21 mg, 21 mg, Transdermal, Daily, Lisa Cuenca MD, 21 mg at 03/21/24 1228    polyethylene glycol (MIRALAX) packet 17 g, 17 g, Oral, Daily PRN, Mitesh Burt MD    senna-docusate sodium (SENOKOT S) 8.6-50 mg per tablet 1 tablet, 1 tablet, Oral, BID PRN, Mitesh Burt MD, 1 tablet at 03/20/24 1307    Laboratory Results:  Results from last 7 days   Lab Units 03/21/24  0735 03/20/24  0654 03/19/24  1417 03/19/24  0509 03/18/24  0501 03/17/24  1226   WBC Thousand/uL 8.07 6.25  --  7.25 7.09 7.01   HEMOGLOBIN g/dL 12.7 12.8  --  12.7 13.8 12.9   HEMATOCRIT % 39.1 38.7  --  38.7 41.6 38.7   PLATELETS Thousands/uL 186 175  --  169 171 150   POTASSIUM mmol/L 3.8 5.1 3.8 3.4* 3.5 3.7   CHLORIDE mmol/L 105 103 104 104 100 101   CO2 mmol/L 27 24 26 26 25 28   BUN mg/dL 28* 23 19 19 18 17   CREATININE mg/dL 1.08 1.16 0.88 0.90 1.07 1.08   CALCIUM mg/dL 9.1 9.1 9.5 9.0 9.2 9.0   MAGNESIUM mg/dL 2.2 2.7  --  2.2 2.1  --    PHOSPHORUS mg/dL  --  2.8 3.0 2.3* 3.7  --

## 2024-03-21 NOTE — ASSESSMENT & PLAN NOTE
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 3/19 Renal US: Left renal artery  60 - 99% stenosis was identified in the tortuous main renal artery. Patent left renal vein. Normal R renal artery/vein.   Nephrology following, have discussed with neurology that goal blood pressure should be normotension especially with concurrent fusiform descending aortic aneurysm, will start NRT to manage risk factors  f/u aldosterone/renin, metanephrine levels  Continue current regimen with Coreg 12.5 twice daily, Norvasc 10 daily, lisinopril 10 daily  Blood pressure still not at goal, will determine with nephrology can continue blood pressure management outpatient or if needs to be optimized prior to discharge

## 2024-03-21 NOTE — PHYSICAL THERAPY NOTE
PHYSICAL THERAPY NOTE          Patient Name: DEANNA Ashraf  Today's Date: 3/21/2024       03/21/24 1333   PT Last Visit   PT Visit Date 03/21/24   Note Type   Note Type Treatment   Pain Assessment   Pain Assessment Tool 0-10   Pain Score No Pain   Restrictions/Precautions   Weight Bearing Precautions Per Order No   Other Precautions Chair Alarm;Bed Alarm;Multiple lines;Telemetry;Fall Risk  (L hemiparesis, L foot drop)   General   Chart Reviewed Yes   Response to Previous Treatment Patient with no complaints from previous session.   Family/Caregiver Present No   Cognition   Overall Cognitive Status WFL   Arousal/Participation Cooperative   Attention Within functional limits   Orientation Level Oriented X4   Memory Within functional limits   Following Commands Follows all commands and directions without difficulty   Subjective   Subjective agreeable   Bed Mobility   Supine to Sit 6  Modified independent   Sit to Supine 6  Modified independent   Transfers   Sit to Stand 4  Minimal assistance   Additional items Assist x 1;Increased time required;Verbal cues   Stand to Sit 4  Minimal assistance   Additional items Assist x 1;Increased time required;Verbal cues   Additional Comments c RW; x4 STS +x5 with feet staggered (L foot posterior to R)   Ambulation/Elevation   Gait pattern Improper Weight shift;Decreased foot clearance;Short stride;Excessively slow;L Hemiparesis;L Foot drag;Decreased heel strike  (L steppage and L knee hyperextension)   Gait Assistance 4  Minimal assist   Additional items Assist x 1;Verbal cues  (c RW; modAx1 with L HHA)   Assistive Device Rolling walker  (L HHA)   Distance 150' c RW+75'x2 L HHA   Stair Management Assistance 4  Minimal assist   Additional items Assist x 1;Verbal cues;Increased time required   Stair Management Technique One rail R;Step to pattern;Foreward;Nonreciprocal   Number of Stairs 7    Ambulation/Elevation Additional Comments +LLE step up on 8 inch step with minAx1 x10 reps   Balance   Static Sitting Fair +   Dynamic Sitting Fair   Static Standing Fair -   Dynamic Standing Poor +   Ambulatory Poor -   Endurance Deficit   Endurance Deficit Yes   Endurance Deficit Description L hemiparesis   Activity Tolerance   Activity Tolerance Patient limited by fatigue   Nurse Made Aware yes   Exercises   Neuro re-ed c minAx1: lateral stepping at anterior HR with minAx1 12' L and R, TKE b/l x10 reps at anterior HR   Assessment   Prognosis Good   Problem List Decreased strength;Decreased endurance;Impaired balance;Decreased mobility;Decreased coordination;Decreased cognition;Impaired judgement;Decreased safety awareness   Assessment Pt agreeable to participate in PT session. Pt performed functional mobility and therex as outlined above. Progressing towards goals however functioning well below his baseline and would benefit from intensive PT to return to PLOF. L foot drop, L steppage gait, and L knee hyperextension. Focus on quad strengthening throughout session. Pt left seated in bed with bed alarm, call bell, phone, and all personal needs within reach. Pt will continue to benefit from skilled acute care PT to further address their functional mobility limitations.   Barriers to Discharge Decreased caregiver support;Inaccessible home environment   Goals   Patient Goals to go home   LTG Expiration Date 04/01/24   PT Treatment Day 2   Plan   Treatment/Interventions Functional transfer training;LE strengthening/ROM;Elevations;Therapeutic exercise;Endurance training;Patient/family training;Equipment eval/education;Bed mobility;Gait training;Spoke to nursing;Spoke to case management;OT   Progress Progressing toward goals   PT Frequency 3-5x/wk   Discharge Recommendation   Rehab Resource Intensity Level, PT I (Maximum Resource Intensity)   Equipment Recommended Walker   Walker Package Recommended Wheeled walker    Additional Comments pt would like to go home, if goes home, recommend focus on neuro OPPT   AM-PAC Basic Mobility Inpatient   Turning in Flat Bed Without Bedrails 3   Lying on Back to Sitting on Edge of Flat Bed Without Bedrails 3   Moving Bed to Chair 3   Standing Up From Chair Using Arms 3   Walk in Room 3   Climb 3-5 Stairs With Railing 3   Basic Mobility Inpatient Raw Score 18   Basic Mobility Standardized Score 41.05   [facility] Highest Level Of Mobility   -HLM Goal 6: Walk 10 steps or more   J[facility]-HLM Achieved 7: Walk 25 feet or more   Sergey Ahuja, PT, DPT

## 2024-03-21 NOTE — ASSESSMENT & PLAN NOTE
Diagnosis: Substance use disorder  Home medication: methadone.   Methadone clinic: [facility] in [place], [place]; confirmed dose with them.   Pt denies illicit drug use.   UDS on 3/17 +methadone only.  Monitor for s/s of withdrawal  105 morning dose (oral solution), 120 night dose (tablets)

## 2024-03-21 NOTE — PROGRESS NOTES
Progress Note - Neurology   DEANNA Ashraf 33 y.o. male MRN: [id number]  Unit/Bed#: [facility] 726-01 Encounter: [id number]      Assessment/Plan     Right hemispheric stroke  Assessment & Plan  33 y.o. male with uncontrolled HTN (poor medication compliance) and prior heroin abuse now on methadone who initially presented to [facility] on 3/17/2024 as a stroke alert for left-sided weakness/numbness, unsteady gait, 10/10 headache, and transient right-sided vision loss (resolved within minutes).      -/119 on arrival.    -NIHSS 5 (left facial droop, LUE/LLE drift, LUE/LLE sensory loss, mild/moderate dysarthria).    -CT head showed chronic right basal ganglia lacunar infarct.    -CTA head/neck revealed occluded left PCA proximally with some distal reconstitution, moderate atherosclerotic narrowing of the anterior left M2 branch, and 2 mm aneurysm of the anterior communicating artery at the junction of the left A1/A2 segments.    -Patient was loaded with aspirin 324 mg once, started on Cardene drip, and transferred to [facility] for possible neurosurgery intervention.  -MRI brain: Acute infarct in right posterior caudate extending into right posterior limb of internal capsule.  No vessel wall thickening or vessel wall enhancement to suggest active vasculitis.   Chronic infarct in posterior putamen extending into right external capsule with hemosiderin deposition, likely sequelae of hemorrhagic infarct.   Chronic lacunar infarcts in bilateral roberto.   Multiple chronic microhemorrhages in bilateral roberto and to a lesser extent in right caudate, left putamen, and bilateral cerebellum. Findings suggestive of hypertensive arteriopathy.  -MRA head: Probable chronic occlusion of proximal left PCA with faint flow-related signal more distally possibly due to collateral vessels. Moderate-to-severe short segment narrowing in bilateral MCA M2 branch vessels. Findings raise possibility of vasculitis   over atherosclerotic disease given patient's age.  "Consider cerebral angiography for further evaluation.  Unchanged 0.2 cm anterior communicating artery aneurysm.  -2D echo with EF 65%, dilated LA, normal RA, no MAC.    Labs:   Abnormal:  , UDS positive for methadone.  CRP elevated (4.7, 5.9, 5.13), elevated troponin  WNL: A1c 5.4, Flu/RSV, NISHA, C3/C4 complement, ESR, lactic acid, ethanol, TSH, ANCA, Sjogren's, RF, ACE  Thrombosis panel with low Protein S at 61 (but with questionable significance in acute setting).  D-dimer 0.79    The left M2 narrowing does not correlate with his presenting symptoms.  The L PCA occlusion is likely an incidental finding, and does not correlate with current symptoms.    Acute stroke noted in right posterior caudate may be small vessel in etiology but cannot entirely exclude embolic source.    Plan:  - Neurosurgery consulted; appreciate recommendations  - Stroke pathway  [facility] pending. If unrevealing, recommend Zio patch/ILR.  S/p aspirin 325 mg x1 on 3/17  Continue aspirin 81 mg daily (antiplatelet naive). Plavix 75mg added on 3/20. Continue DAPT x21 days, then d/c Plavix ~4/10/24 and remain on ASA monotherapy.  Continue atorvastatin 40 mg   Goal normotension  Repeat thrombosis panel in 6 weeks  Continue telemetry  PT/OT/ST  Stroke education  Frequent neuro checks. Continue to monitor and notify neurology with any changes.  STAT CT head for any acute change in neuro exam  - Medical management and supportive care per primary team. Correction of any metabolic or infectious disturbances.           DEANNA Ashraf will need follow up in in 6 weeks with neurovascular attending. He will require a repeat thrombosis panel in ~6 weeks.    Subjective:   Patient feels well, no new complaints. Just came back from [place]. Eager for discharge.    ROS: 12 point review of systems performed and negative except as indicated above.    Vitals: Blood pressure (!) 174/93, pulse 67, temperature 98.8 °F (37.1 °C), resp. rate 18, height 5' 9\" (1.753 m), weight " 84.8 kg (187 lb), SpO2 98%.,Body mass index is 27.62 kg/m².    Physical Exam: AP acting as scribe for attending exam  General:  Patient is well-developed, well-nourished, and in no acute distress.  HEENT:  Head normocephalic.  Eyes anicteric.  Edentulous.  Cardiovascular:  With regular rhythm.  Lungs:  Normal effort. Nonlabored breathing.  Extremities:  With no significant edema.    Skin: No rashes.  Neurologic:  Patient is alert, pleasantly interactive, and appropriately conversational.  No obvious symbolic language difficulty or dysarthria.    Gait deferred for safety.    Cranial Nerves:   II: Visual fields full to confrontation.  Cannot visualize optic fundi.  III,IV,VI: extraocular movements intact with no nystagmus.   V: Sensation in the V1 through V3 distributions intact to light touch bilaterally.   VII: Subtle left facial asymmetry noted.  XII: Tongue midline with no atrophy or fasciculations with appropriate movement.     Coordination:  Accurate with finger-to-nose bilaterally.    Strength full to confrontation.    Sensory testing grossly intact to light touch throughout.     Lab, Imaging and other studies: CBC:   Results from last 7 days   Lab Units 03/21/24  0735 03/20/24  0654 03/19/24  0509   WBC Thousand/uL 8.07 6.25 7.25   RBC Million/uL 4.58 4.60 4.60   HEMOGLOBIN g/dL 12.7 12.8 12.7   HEMATOCRIT % 39.1 38.7 38.7   MCV fL 85 84 84   PLATELETS Thousands/uL 186 175 169   , BMP/CMP:   Results from last 7 days   Lab Units 03/21/24  0735 03/20/24  0654 03/19/24  1417 03/19/24  0509 03/18/24  0501 03/17/24  1226   SODIUM mmol/L 140 135 137 139 137 134*   POTASSIUM mmol/L 3.8 5.1 3.8 3.4* 3.5 3.7   CHLORIDE mmol/L 105 103 104 104 100 101   CO2 mmol/L 27 24 26 26 25 28   BUN mg/dL 28* 23 19 19 18 17   CREATININE mg/dL 1.08 1.16 0.88 0.90 1.07 1.08   CALCIUM mg/dL 9.1 9.1 9.5 9.0 9.2 9.0   AST U/L  --   --   --  17 17 13   ALT U/L  --   --   --  16 17 15   ALK PHOS U/L  --   --   --  58 66 54   EGFR  ml/min/1.73sq m 88 81 111 110 89 88   , HgBA1C:   Results from last 7 days   Lab Units 03/18/24  0501   HEMOGLOBIN A1C % 5.4   , TSH:   Results from last 7 days   Lab Units 03/17/24  1557   TSH 3RD GENERATON uIU/mL 0.831   , Lipid Profile:   Results from last 7 days   Lab Units 03/18/24  0501   HDL mg/dL 42   LDL CALC mg/dL 110*   TRIGLYCERIDES mg/dL 33     VTE Prophylaxis: Enoxaparin (Lovenox)    Counseling / Coordination of Care  I have spent a total time of 45 minutes on 03/21/24 in caring for this patient including Diagnostic results, Instructions for management, Patient and family education, Importance of tx compliance, Risk factor reductions, Impressions, Counseling / Coordination of care, Documenting in the medical record, Reviewing / ordering tests, medicine, procedures  , Obtaining or reviewing history  , and Communicating with other healthcare professionals .

## 2024-03-21 NOTE — PLAN OF CARE
Problem: PAIN - ADULT  Goal: Verbalizes/displays adequate comfort level or baseline comfort level  Description: Interventions:  - Encourage patient to monitor pain and request assistance  - Assess pain using appropriate pain scale  - Administer analgesics based on type and severity of pain and evaluate response  - Implement non-pharmacological measures as appropriate and evaluate response  - Consider cultural and social influences on pain and pain management  - Notify physician/advanced practitioner if interventions unsuccessful or patient reports new pain  Outcome: Progressing     Problem: INFECTION - ADULT  Goal: Absence or prevention of progression during hospitalization  Description: INTERVENTIONS:  - Assess and monitor for signs and symptoms of infection  - Monitor lab/diagnostic results  - Monitor all insertion sites, i.e. indwelling lines, tubes, and drains  - Monitor endotracheal if appropriate and nasal secretions for changes in amount and color  - [place] appropriate cooling/warming therapies per order  - Administer medications as ordered  - Instruct and encourage patient and family to use good hand hygiene technique  - Identify and instruct in appropriate isolation precautions for identified infection/condition  Outcome: Progressing  Goal: Absence of fever/infection during neutropenic period  Description: INTERVENTIONS:  - Monitor WBC    Outcome: Progressing

## 2024-03-21 NOTE — ANESTHESIA PREPROCEDURE EVALUATION
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 (HCC)   (+) Hypertension      ENDO (within normal limits)      GI/HEPATIC (within normal limits)      /RENAL   (+) Polycystic kidney disease      HEMATOLOGY (within normal limits)      MUSCULOSKELETAL (within normal limits)      NEURO/PSYCH   (+) Cerebrovascular accident (CVA) due to occlusion of left posterior cerebral artery (HCC)   (+) Right hemispheric stroke      PULMONARY (within normal limits)      Behavioral Health   (+) Methadone use      Neurology/Sleep   (+) Left-sided weakness        Physical Exam    Airway    Mallampati score: I  TM Distance: >3 FB  Neck ROM: full     Dental   Comment: Edentulous upper and lower     Cardiovascular  Rhythm: regular, Rate: normal, Cardiovascular exam normal    Pulmonary  Pulmonary exam normal Breath sounds clear to auscultation    Other Findings        Anesthesia Plan  ASA Score- 3     Anesthesia Type- IV sedation with anesthesia with ASA Monitors.         Additional Monitors:     Airway Plan:            Plan Factors-    Chart reviewed. EKG reviewed. Imaging results reviewed. Existing labs reviewed. Patient summary reviewed.    Patient is a current smoker.  Patient instructed to abstain from smoking on day of procedure. Patient did not smoke on day of surgery.            Induction- intravenous.    Postoperative Plan-     Informed Consent- Anesthetic plan and risks discussed with patient.  I personally reviewed this patient with the CRNA. Discussed and agreed on the Anesthesia Plan with the CRNA..              Echo (3/18/24):      Left Ventricle: Left ventricular cavity size is normal. Wall thickness is moderately increased. There is moderate concentric hypertrophy. The left ventricular ejection fraction is 65%. Systolic function is  vigorous. Wall motion is normal. Diastolic function is mildly abnormal, consistent with grade I (abnormal) relaxation.    Right Ventricle: Right ventricular cavity size is normal. Systolic function is hyperdynamic.    Left Atrium: The atrium is mildly dilated.      NPO and allergies verified.    Plan:  IV sedation, GA backup    Benefits and risks of sedation were discussed with the patient including possibility of recall under sedation and the potential for conversion to general anesthesia if necessary.  All questions were answered.  Anesthesia consent was obtained from the patient.

## 2024-03-21 NOTE — ANESTHESIA POSTPROCEDURE EVALUATION
Post-Op Assessment Note    CV Status:  Stable  Pain Score: 0    Pain management: adequate       Mental Status:  Alert and awake   Hydration Status:  Euvolemic   PONV Controlled:  Controlled   Airway Patency:  Patent  Two or more mitigation strategies used for obstructive sleep apnea   Post Op Vitals Reviewed: Yes    No anethesia notable event occurred.    Staff: CRNA               BP      Temp      Pulse     Resp      SpO2

## 2024-03-21 NOTE — PLAN OF CARE
Problem: PHYSICAL THERAPY ADULT  Goal: Performs mobility at highest level of function for planned discharge setting.  See evaluation for individualized goals.  Description: Treatment/Interventions: Functional transfer training, LE strengthening/ROM, Endurance training, Therapeutic exercise, Elevations, Bed mobility, Gait training, Equipment eval/education, OT, Spoke to nursing          See flowsheet documentation for full assessment, interventions and recommendations.  Outcome: Progressing  Note: Prognosis: Good  Problem List: Decreased strength, Decreased endurance, Impaired balance, Decreased mobility, Decreased coordination, Decreased cognition, Impaired judgement, Decreased safety awareness  Assessment: Pt agreeable to participate in PT session. Pt performed functional mobility and therex as outlined above. Progressing towards goals however functioning well below his baseline and would benefit from intensive PT to return to PLOF. L foot drop, L steppage gait, and L knee hyperextension. Focus on quad strengthening throughout session. Pt left seated in bed with bed alarm, call bell, phone, and all personal needs within reach. Pt will continue to benefit from skilled acute care PT to further address their functional mobility limitations.  Barriers to Discharge: Decreased caregiver support, Inaccessible home environment     Rehab Resource Intensity Level, PT: I (Maximum Resource Intensity)    See flowsheet documentation for full assessment.

## 2024-03-21 NOTE — PLAN OF CARE
Problem: OCCUPATIONAL THERAPY ADULT  Goal: Performs self-care activities at highest level of function for planned discharge setting.  See evaluation for individualized goals.  Description: Treatment Interventions: ADL retraining, Functional transfer training, UE strengthening/ROM, Endurance training, Fine motor coordination activities, Activityengagement          See flowsheet documentation for full assessment, interventions and recommendations.   Outcome: Progressing  Note: Limitation: Decreased ADL status, Decreased UE strength, Decreased Safe judgement during ADL, Decreased endurance  Prognosis: Good  Assessment: Patient participated in Skilled OT session this date with interventions consisting of ADL re training with the use of correct body mechnaics, safety awareness and fall prevention techniques, therapeutic exercise to: increase functional use of BUEs, increase BUE muscle strength ,  therapeutic activities to: increase activity tolerance, increase dynamic sit/ stand balance during functional activity , and increase OOB/ sitting tolerance .Upon arrival patient was found supine in bed. Pt demonstrated the following tasks: MI sup <> sit, S STS, and CGA fnxl mobility with RW, S for LBD. Pt engaged in LUE therex and FMC tasks with good tolerance. See flowsheet for more details. Pt also participated in Burt Cognitive Assessment and scored 27/30 indicating normal cognitive functioning. Please see further details regarding score below. Patient continues to be functioning below baseline level, occupational performance remains limited secondary to factors listed above and increased risk for falls and injury.   From OT standpoint, recommendation at time of d/c would STR vs home with OPOT and increased social support - pending progress. Patient to benefit from continued Occupational Therapy treatment while in the hospital to address deficits as defined above and maximize level of functional independence with ADLs  and functional mobility. Pt was left after session with alarm on and all current needs met. The patient's raw score on the AM-PAC Daily Activity Inpatient Short Form is 20. A raw score of greater than or equal to 19 suggests the patient may benefit from discharge to home. Please refer to the recommendation of the Occupational Therapist for safe discharge planning.     Rehab Resource Intensity Level, OT: I (Maximum Resource Intensity) (vs home with OPOT - pending progress)

## 2024-03-21 NOTE — ASSESSMENT & PLAN NOTE
LUE 4/5 LLE 3/5  PT/OT following patient  Recommending post-acute rehab once discharged - pt would like o/p rehab

## 2024-03-22 LAB
ANION GAP SERPL CALCULATED.3IONS-SCNC: 6 MMOL/L (ref 4–13)
BASOPHILS # BLD AUTO: 0.06 THOUSANDS/ÂΜL (ref 0–0.1)
BASOPHILS NFR BLD AUTO: 1 % (ref 0–1)
BUN SERPL-MCNC: 27 MG/DL (ref 5–25)
CALCIUM SERPL-MCNC: 9 MG/DL (ref 8.4–10.2)
CHLORIDE SERPL-SCNC: 105 MMOL/L (ref 96–108)
CO2 SERPL-SCNC: 28 MMOL/L (ref 21–32)
CREAT SERPL-MCNC: 1.17 MG/DL (ref 0.6–1.3)
EOSINOPHIL # BLD AUTO: 0.12 THOUSAND/ÂΜL (ref 0–0.61)
EOSINOPHIL NFR BLD AUTO: 2 % (ref 0–6)
ERYTHROCYTE [DISTWIDTH] IN BLOOD BY AUTOMATED COUNT: 13.5 % (ref 11.6–15.1)
GFR SERPL CREATININE-BSD FRML MDRD: 80 ML/MIN/1.73SQ M
GLUCOSE SERPL-MCNC: 92 MG/DL (ref 65–140)
HCT VFR BLD AUTO: 39.1 % (ref 36.5–49.3)
HGB BLD-MCNC: 13 G/DL (ref 12–17)
IMM GRANULOCYTES # BLD AUTO: 0.01 THOUSAND/UL (ref 0–0.2)
IMM GRANULOCYTES NFR BLD AUTO: 0 % (ref 0–2)
LYMPHOCYTES # BLD AUTO: 3.06 THOUSANDS/ÂΜL (ref 0.6–4.47)
LYMPHOCYTES NFR BLD AUTO: 40 % (ref 14–44)
MCH RBC QN AUTO: 28.3 PG (ref 26.8–34.3)
MCHC RBC AUTO-ENTMCNC: 33.2 G/DL (ref 31.4–37.4)
MCV RBC AUTO: 85 FL (ref 82–98)
METHADONE SERPL-MCNC: 522 NG/ML (ref 100–400)
MONOCYTES # BLD AUTO: 0.83 THOUSAND/ÂΜL (ref 0.17–1.22)
MONOCYTES NFR BLD AUTO: 11 % (ref 4–12)
NEUTROPHILS # BLD AUTO: 3.56 THOUSANDS/ÂΜL (ref 1.85–7.62)
NEUTS SEG NFR BLD AUTO: 46 % (ref 43–75)
NRBC BLD AUTO-RTO: 0 /100 WBCS
PLATELET # BLD AUTO: 189 THOUSANDS/UL (ref 149–390)
PMV BLD AUTO: 11.2 FL (ref 8.9–12.7)
POTASSIUM SERPL-SCNC: 3.6 MMOL/L (ref 3.5–5.3)
RBC # BLD AUTO: 4.59 MILLION/UL (ref 3.88–5.62)
SODIUM SERPL-SCNC: 139 MMOL/L (ref 135–147)
WBC # BLD AUTO: 7.64 THOUSAND/UL (ref 4.31–10.16)

## 2024-03-22 PROCEDURE — 80048 BASIC METABOLIC PNL TOTAL CA: CPT | Performed by: STUDENT IN AN ORGANIZED HEALTH CARE EDUCATION/TRAINING PROGRAM

## 2024-03-22 PROCEDURE — 99232 SBSQ HOSP IP/OBS MODERATE 35: CPT | Performed by: INTERNAL MEDICINE

## 2024-03-22 PROCEDURE — 85025 COMPLETE CBC W/AUTO DIFF WBC: CPT | Performed by: STUDENT IN AN ORGANIZED HEALTH CARE EDUCATION/TRAINING PROGRAM

## 2024-03-22 RX ORDER — LISINOPRIL 10 MG/1
10 TABLET ORAL DAILY
Qty: 30 TABLET | Refills: 0 | Status: CANCELLED | OUTPATIENT
Start: 2024-03-22

## 2024-03-22 RX ORDER — GABAPENTIN 100 MG/1
100 CAPSULE ORAL ONCE
Status: COMPLETED | OUTPATIENT
Start: 2024-03-22 | End: 2024-03-22

## 2024-03-22 RX ORDER — LISINOPRIL 10 MG/1
10 TABLET ORAL EVERY 12 HOURS
Status: DISCONTINUED | OUTPATIENT
Start: 2024-03-22 | End: 2024-03-23 | Stop reason: HOSPADM

## 2024-03-22 RX ADMIN — METHADONE HYDROCHLORIDE 120 MG: 10 TABLET ORAL at 18:13

## 2024-03-22 RX ADMIN — MELATONIN 6 MG: at 22:16

## 2024-03-22 RX ADMIN — LISINOPRIL 10 MG: 10 TABLET ORAL at 09:00

## 2024-03-22 RX ADMIN — ACETAMINOPHEN 975 MG: 325 TABLET, FILM COATED ORAL at 22:16

## 2024-03-22 RX ADMIN — ASPIRIN 81 MG CHEWABLE TABLET 81 MG: 81 TABLET CHEWABLE at 09:00

## 2024-03-22 RX ADMIN — CLOPIDOGREL BISULFATE 75 MG: 75 TABLET ORAL at 09:00

## 2024-03-22 RX ADMIN — ATORVASTATIN CALCIUM 40 MG: 40 TABLET, FILM COATED ORAL at 17:37

## 2024-03-22 RX ADMIN — CHLORHEXIDINE GLUCONATE 15 ML: 1.2 SOLUTION ORAL at 09:00

## 2024-03-22 RX ADMIN — ENOXAPARIN SODIUM 40 MG: 40 INJECTION SUBCUTANEOUS at 09:00

## 2024-03-22 RX ADMIN — NICOTINE 21 MG: 21 PATCH, EXTENDED RELEASE TRANSDERMAL at 09:00

## 2024-03-22 RX ADMIN — CARVEDILOL 12.5 MG: 12.5 TABLET, FILM COATED ORAL at 17:37

## 2024-03-22 RX ADMIN — DIPHENHYDRAMINE HCL 25 MG: 25 TABLET ORAL at 22:17

## 2024-03-22 RX ADMIN — LISINOPRIL 10 MG: 10 TABLET ORAL at 22:15

## 2024-03-22 RX ADMIN — CARVEDILOL 12.5 MG: 12.5 TABLET, FILM COATED ORAL at 09:26

## 2024-03-22 RX ADMIN — METHADONE HYDROCHLORIDE 105 MG: 10 CONCENTRATE ORAL at 09:27

## 2024-03-22 RX ADMIN — AMLODIPINE BESYLATE 10 MG: 10 TABLET ORAL at 09:00

## 2024-03-22 RX ADMIN — GABAPENTIN 100 MG: 100 CAPSULE ORAL at 22:40

## 2024-03-22 NOTE — RESTORATIVE TECHNICIAN NOTE
Restorative Technician Note      Patient Name: DEANNA Ashraf     Note Type: Mobility  Patient Position Upon Consult: Supine  Activity Performed: Ambulated; Dangled; Stood  Assistive Device: Roller walker  Education Provided: Yes  Patient Position at End of Consult: Supine; All needs within reach; Bed/Chair alarm activated    Tima GARVEY, Restorative Technician,

## 2024-03-22 NOTE — DISCHARGE SUMMARY
[facility]  Discharge- DEANNA X Andriy 1990, 33 y.o. male MRN: [id number]  [facility] Encounter: [id number]  Primary Care Provider: No primary care provider on file.   Date and time admitted to hospital: 3/18/2024  3:00 AM    * Cerebrovascular accident (CVA) due to occlusion of left posterior cerebral artery (HCC)  Assessment & Plan  Diagnosis: Left PCA occlusion   Patient presented with left-sided weakness/numbness, unsteady gait, 10/10 headache, and transient right-sided vision loss (resolved within minutes). /119 on arrival.  NIHSS 5 (left facial droop, LUE/LLE drift, LUE/LLE sensory loss, mild/moderate dysarthria). CT head showed chronic right basal ganglia lacunar infarct.  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  Patient was loaded with aspirin 324 mg once, started on Cardene drip, and transferred to [facility] for possible neurosurgery intervention. 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 3/19 Renal US: Left renal artery  60 - 99% stenosis was identified in the tortuous main renal artery. Patent left renal vein. Normal R renal artery/vein.   Nephrology following, have discussed with neurology that goal blood pressure should be normotension especially with concurrent fusiform descending aortic aneurysm, will start NRT to manage risk factors  f/u  aldosterone/renin, metanephrine levels  Continue current regimen with Coreg 12.5 twice daily, Norvasc 10 daily, lisinopril 10 BID  Patient will follow up with nephro as an o/p   Repeat BMP in 1 week    Smoking  Assessment & Plan  Smoking cessation advised  Nicotine replacement therapy  Patient motivated to quit    Aneurysm of descending thoracic aorta without rupture (HCC)  Assessment & Plan  5.0 cm descending thoracic aneurysm without chest pain or abdominal pain  Vascular consulted - o/p follow up   Smoking cessation advised   Continue statin asa    patient to avoid heavy lifting     Methadone use  Assessment & Plan  Diagnosis: Substance use disorder  Home medication: methadone.   Methadone clinic: [facility] in [place], [place]; confirmed dose with them.   Pt denies illicit drug use.   UDS on 3/17 +methadone only.  Monitor for s/s of withdrawal  105 morning dose (oral solution), 120 night dose (tablets)    Aneurysm of anterior communicating artery  Assessment & Plan  Outpatient neurosurgery follow-up    Left-sided weakness  Assessment & Plan  LUE 4/5 LLE 3/5  PT/OT following patient  Recommending post-acute rehab once discharged - pt would like o/p rehab      Medical Problems       Resolved Problems  Date Reviewed: 3/22/2024            Resolved    Hypertensive emergency 3/23/2024     Resolved by  Tanesha Rose DO    Impaired mobility and activities of daily living 3/19/2024     Resolved by  Sujatha Sullivan    At risk for altered urinary elimination 3/19/2024     Resolved by  Sujatha Sullivan    At risk for constipation 3/19/2024     Resolved by  Sujatha Sullivan    At high risk for venous thromboembolism (VTE) 3/19/2024     Resolved by  Sujatha Sullivan    At high risk for skin breakdown 3/19/2024     Resolved by  Sujatha Sullivan        Discharging Resident: Tanesha Rose DO  Discharging Attending: Bienvenido Cortez MD  PCP: No primary care provider on file.  Admission Date:   Admission Orders  (From admission, onward)       Ordered        03/18/24 0346  Inpatient Admission  Once                          Discharge Date: 03/23/24    Consultations During Hospital Stay:  PMNR  Vascular surgery  Neurology  Nephrology  Neurosurgery    Procedures Performed:   None    Significant Findings / Test Results:   CT head 3/17 Old lacunar infarction within the right basal ganglia. Mild surrounding low density which may represent chronic gliosis. However, the possibility of subacute ivelisse-infarct ischemia is not excluded. Recommend dedicated MRI of the brain with diffusion-weighted imaging.  CTA head neck 3/17 Mild smooth stenosis of the right cavernous internal carotid artery. There appears to be occlusion of the left posterior cerebral artery proximally with some distal reconstitution. At least one of the anterior M2 branches on the left demonstrates moderate atherosclerotic narrowing. 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  Renal artery ultrasound 3/18 right renal artery patent, but left renal artery indicates 60-99% stenosis with a tortuous main renal artery, increased renal/aorta ratio compared to the right and smaller in size compared to the right  MRI brain with and without contrast 3/19 acute infarct in right posterior caudate extending into right posterior limb of internal capsule. No vessel wall thickening or vessel wall enhancement to suggest active vasculitis. Chronic infarct in posterior putamen extending into right external capsule with hemosiderin deposition, likely sequelae of hemorrhagic infarct. Chronic lacunar infarcts in bilateral roberto. Multiple chronic microhemorrhages in bilateral roberto and to a lesser extent in right caudate, left putamen, and bilateral cerebellum. Findings suggestive of hypertensive arteriopathy. Probable chronic occlusion of proximal left PCA with faint flow-related signal more distally possibly due to collateral vessels.  Moderate-to-severe short segment narrowing in bilateral MCA M2 branch vessels. Findings raise possibility of vasculitis over atherosclerotic disease given patient's age. Consider cerebral angiography for further evaluation. Unchanged 0.2 cm anterior communicating artery aneurysm.  VALENTINA 3/21 EF 60%, left atrium dilated, no PFO detected at rest or with agitated saline, reduced function of the left atrial appendage with no visible thrombus, mild nonprotruding atheroma in the descending aorta    Incidental Findings:   CTA chest abdomen pelvis 3/18 5 cm fusiform aneurysm of the descending aorta at the level of the hiatus with no evidence of dissection, multiple small bilateral renal cysts   I reviewed the above mentioned incidental findings with the patient and/or family and they expressed understanding.    Test Results Pending at Discharge (will require follow up):  Aldosterone renin ratio  Urine metanephrines     Outpatient Tests Requested:  Zio patch  Repeat thrombosis panel in 6 weeks    Complications: None    Reason for Admission: CVA    Hospital Course:   DEANNA Ashraf is a 33 y.o. male patient who originally presented to the hospital on 3/18/2024 due to stroke alert at [facility] on 3/17 that was transferred to [facility] for possible neurosurgical intervention.  Patient was determined to not require surgical intervention.  Patient was placed on a Cardene drip to control blood pressure and eventually transferred to p.o. medications.  Resistant hypertension workup and stroke pathway started.  Patient placed on DAPT with aspirin and Plavix and started on Lipitor given CVA. Nephrology was consulted for HTN in the setting of likely ADPCKD.  Patient amenanable to smoking cessation and started on nicotine replacement patches.  Vascular surgery evaluated patient for aortic aneurysm finding and will follow in the outpatient setting.  Neurology will follow in the outpatient setting in 6 weeks.  Neurosurgery will follow in the  "outpatient setting for SHARAD aneurysm.  Despite extensive rehab needs, patient wishes to complete rehab outpatient.  Patient will be medically cleared for discharge with outpatient physical therapy when blood pressure control is optimized.    Please see above list of diagnoses and related plan for additional information.     Condition at Discharge: good    Discharge Day Visit / Exam:   Subjective:  patient feels well and is ready to go home today. No complaints at this time. Discussed intructions after discharge and patient had no additional questions   Vitals: Blood Pressure: 141/91 (03/23/24 0748)  Pulse: 61 (03/23/24 0748)  Temperature: 98.7 °F (37.1 °C) (03/23/24 0748)  Temp Source: Oral (03/18/24 1501)  Respirations: 16 (03/23/24 0748)  Height: 5' 9\" (175.3 cm) (03/21/24 1033)  Weight - Scale: 83.9 kg (184 lb 15.5 oz) (03/23/24 0550)  SpO2: 98 % (03/23/24 0748)  Exam:   Physical Exam  Vitals and nursing note reviewed.   Constitutional:       General: He is not in acute distress.     Appearance: He is well-developed.   HENT:      Head: Normocephalic and atraumatic.      Mouth/Throat:      Mouth: Mucous membranes are moist.   Eyes:      Extraocular Movements: Extraocular movements intact.      Conjunctiva/sclera: Conjunctivae normal.      Pupils: Pupils are equal, round, and reactive to light.   Cardiovascular:      Rate and Rhythm: Normal rate and regular rhythm.      Heart sounds: No murmur heard.  Pulmonary:      Effort: Pulmonary effort is normal. No respiratory distress.      Breath sounds: Normal breath sounds.   Abdominal:      Palpations: Abdomen is soft.      Tenderness: There is no abdominal tenderness.   Musculoskeletal:         General: No swelling.      Cervical back: Neck supple.      Right lower leg: No edema.      Left lower leg: No edema.   Skin:     General: Skin is warm and dry.      Capillary Refill: Capillary refill takes less than 2 seconds.   Neurological:      Mental Status: He is alert.     "  Motor: Weakness present.      Comments: Mild LFD   LUE 4/5 LLE 4/5   Psychiatric:         Mood and Affect: Mood normal.       Discussion with Family: Patient declined call to .     Discharge instructions/Information to patient and family:   See after visit summary for information provided to patient and family.      Provisions for Follow-Up Care:  See after visit summary for information related to follow-up care and any pertinent home health orders.      Mobility at time of Discharge:   Basic Mobility Inpatient Raw Score: 21  JH-HLM Goal: 6: Walk 10 steps or more  JH-HLM Achieved: 6: Walk 10 steps or more  HLM Goal achieved. Continue to encourage appropriate mobility.     Disposition:   Home w O/P rehab     Planned Readmission: No    Discharge Medications:  See after visit summary for reconciled discharge medications provided to patient and/or family.      **Please Note: This note may have been constructed using a voice recognition system**

## 2024-03-22 NOTE — CASE MANAGEMENT
Case Management Discharge Planning Note    Patient name DEANNA Ashraf  Location [facility] 726/[facility] 726-01 MRN [id number]  : 1990 Date 3/22/2024       Current Admission Date: 3/18/2024  Current Admission Diagnosis:Cerebrovascular accident (CVA) due to occlusion of left posterior cerebral artery (HCC)   Patient Active Problem List    Diagnosis Date Noted    Hypertension 2024    Smoking 2024    Aneurysm of descending thoracic aorta without rupture (HCC) 2024    Polycystic kidney disease 2024    Right hemispheric stroke 2024    Cerebrovascular accident (CVA) due to occlusion of left posterior cerebral artery (HCC) 2024    Left-sided weakness 2024    Aneurysm of anterior communicating artery 2024    Methadone use 2024    Hypertensive emergency 2024      LOS (days): 4  Geometric Mean LOS (GMLOS) (days):   Days to GMLOS:     OBJECTIVE:  Risk of Unplanned Readmission Score: 16.49         Current admission status: Inpatient   Preferred Pharmacy:   [facility] [address]  [address] [address]  Phone: [phone] Fax: [phone]    Primary Care Provider: No primary care provider on file.    Primary Insurance: [employer]  Secondary Insurance:     DISCHARGE DETAILS:           Additional Comments: Pt will remain o/n to monitor BP. Anticipated DC is 3/23

## 2024-03-22 NOTE — PROGRESS NOTES
[facility]  Progress Note  Name: DEANNA Ashraf I  MRN: [id number]  [facility] Date of Admission: 3/18/2024   Date of Service: 3/22/2024 I Hospital Day: 4    Assessment/Plan   * Cerebrovascular accident (CVA) due to occlusion of left posterior cerebral artery (HCC)  Assessment & Plan  Diagnosis: Left PCA occlusion   Patient presented with left-sided weakness/numbness, unsteady gait, 10/10 headache, and transient right-sided vision loss (resolved within minutes). /119 on arrival.  NIHSS 5 (left facial droop, LUE/LLE drift, LUE/LLE sensory loss, mild/moderate dysarthria). CT head showed chronic right basal ganglia lacunar infarct.  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  Patient was loaded with aspirin 324 mg once, started on Cardene drip, and transferred to [facility] for possible neurosurgery intervention.  Repeat thrombosis panel 6 weeks  Migraine cocktail as needed (AVOID using NSAIDS, Triptans, steroids and DHE)   Continue ASA 81mg and Lipitor 40mg daily, started on Plavix for 21 days of DAPT then ASA monotherapy  Goal normotension - a/p below for HTN  Neurology following   Will require zio patch - o/p cardiology referral   No NSGY intervention, PCA chronic with collaterals present    Smoking  Assessment & Plan  Smoking cessation advised  Nicotine replacement therapy  Patient motivated to quit    Aneurysm of descending thoracic aorta without rupture (HCC)  Assessment & Plan  5.0 cm descending thoracic aneurysm without chest pain or abdominal pain  Vascular consulted - o/p follow up   Smoking cessation advised   Continue statin asa    patient to avoid heavy lifting     Polycystic kidney disease  Assessment & Plan  Autosomal Dominant Polycystic Kidney  "Disease w/positive family history  Patient with bilateral renal cyst. 3/19 Renal US: Left renal artery  60 - 99% stenosis was identified in the tortuous main renal artery. Patent left renal vein. Normal R renal artery/vein.   Nephrology following, have discussed with neurology that goal blood pressure should be normotension especially with concurrent fusiform descending aortic aneurysm, will start NRT to manage risk factors  f/u aldosterone/renin, metanephrine levels  Continue current regimen with Coreg 12.5 twice daily, Norvasc 10 daily, lisinopril 10 daily  Blood pressure still not at goal, will determine with nephrology can continue blood pressure management outpatient or if needs to be optimized prior to discharge    Hypertensive emergency  Assessment & Plan  Resolved. See plan above in \"CVA\"     Methadone use  Assessment & Plan  Diagnosis: Substance use disorder  Home medication: methadone.   M[facility]adone clinic: [facility] in [place], [place]; confirmed dose with them.   Pt denies illicit drug use.   UDS on 3/17 +methadone only.  Monitor for s/s of withdrawal  105 morning dose (oral solution), 120 night dose (tablets)    Aneurysm of anterior communicating artery  Assessment & Plan  Outpatient neurosurgery follow-up    Left-sided weakness  Assessment & Plan  LUE 4/5 LLE 3/5  PT/OT following patient  Recommending post-acute rehab once discharged - pt would like o/p rehab               VTE Pharmacologic Prophylaxis:   High Risk (Score >/= 5) - Pharmacological DVT Prophylaxis Ordered: enoxaparin (Lovenox). Sequential Compression Devices Ordered.    Mobility:   Basic Mobility Inpatient Raw Score: 18  JH-HLM Goal: 6: Walk 10 steps or more  JH-HLM Achieved: 7: Walk 25 feet or more  JH-HLM Goal achieved. Continue to encourage appropriate mobility.    Patient Centered Rounds: I performed bedside rounds with nursing staff today.  Discussions with Specialists or Other Care Team Provider: Neurosurgery and " nephrology    Education and Discussions with Family / Patient: Patient declined call to .     Current Length of Stay: 4 day(s)  Current Patient Status: Inpatient   Discharge Plan: Anticipate discharge later today or tomorrow to home with home services.    Code Status: Level 1 - Full Code    Subjective:   No acute overnight events.  Patient is firm that he wants outpatient rehab.  He is comfortable with a walker and feels that his left arm is gaining strength.  He expresses frustration that his left toes are still difficult to wiggle at all.  He is open to going home with nicotine patches to continue preventing smoking.    Objective:     Vitals:   Temp (24hrs), Av.1 °F (37.3 °C), Min:98.9 °F (37.2 °C), Max:99.2 °F (37.3 °C)    Temp:  [98.9 °F (37.2 °C)-99.2 °F (37.3 °C)] 98.9 °F (37.2 °C)  HR:  [60-70] 64  Resp:  [18] 18  BP: (159-189)/() 189/108  SpO2:  [97 %-99 %] 99 %  Body mass index is 27.51 kg/m².     Input and Output Summary (last 24 hours):     Intake/Output Summary (Last 24 hours) at 3/22/2024 0847  Last data filed at 3/21/2024 2101  Gross per 24 hour   Intake 437 ml   Output 1375 ml   Net -938 ml       Physical Exam:   Physical Exam  Vitals reviewed.   Constitutional:       Appearance: He is well-developed.   HENT:      Head: Normocephalic and atraumatic.   Eyes:      Extraocular Movements: Extraocular movements intact.      Pupils: Pupils are equal, round, and reactive to light.   Cardiovascular:      Rate and Rhythm: Normal rate and regular rhythm.      Heart sounds: Normal heart sounds. No murmur heard.  Pulmonary:      Effort: Pulmonary effort is normal. No respiratory distress.      Breath sounds: Normal breath sounds.   Abdominal:      Tenderness: There is no abdominal tenderness.   Musculoskeletal:      Cervical back: Neck supple.      Right lower leg: No edema.      Left lower leg: No edema.   Skin:     General: Skin is warm and dry.      Capillary Refill: Capillary refill  takes less than 2 seconds.   Neurological:      Mental Status: He is alert and oriented to person, place, and time.      Sensory: No sensory deficit.      Motor: Weakness present.      Coordination: Coordination normal.      Comments: Left facial droop  LUE 4/5  LLE 3/5 difficulty with moving left toes   Psychiatric:         Mood and Affect: Mood normal.          Additional Data:     Labs:  Results from last 7 days   Lab Units 03/21/24  0735 03/20/24  0654 03/19/24  0509   WBC Thousand/uL 8.07   < > 7.25   HEMOGLOBIN g/dL 12.7   < > 12.7   HEMATOCRIT % 39.1   < > 38.7   PLATELETS Thousands/uL 186   < > 169   NEUTROS PCT %  --   --  49   LYMPHS PCT %  --   --  40   MONOS PCT %  --   --  8   EOS PCT %  --   --  2    < > = values in this interval not displayed.     Results from last 7 days   Lab Units 03/21/24  0735 03/19/24  1417 03/19/24  0509   SODIUM mmol/L 140   < > 139   POTASSIUM mmol/L 3.8   < > 3.4*   CHLORIDE mmol/L 105   < > 104   CO2 mmol/L 27   < > 26   BUN mg/dL 28*   < > 19   CREATININE mg/dL 1.08   < > 0.90   ANION GAP mmol/L 8   < > 9   CALCIUM mg/dL 9.1   < > 9.0   ALBUMIN g/dL  --   --  4.0   TOTAL BILIRUBIN mg/dL  --   --  0.51   ALK PHOS U/L  --   --  58   ALT U/L  --   --  16   AST U/L  --   --  17   GLUCOSE RANDOM mg/dL 84   < > 94    < > = values in this interval not displayed.     Results from last 7 days   Lab Units 03/17/24  1226   INR  1.10     Results from last 7 days   Lab Units 03/17/24  1211   POC GLUCOSE mg/dl 116     Results from last 7 days   Lab Units 03/18/24  0501   HEMOGLOBIN A1C % 5.4     Results from last 7 days   Lab Units 03/17/24  1557   LACTIC ACID mmol/L 1.3       Lines/Drains:  Invasive Devices       Peripheral Intravenous Line  Duration             Peripheral IV 03/21/24 Right;Upper Forearm 1 day                          Imaging: Reviewed radiology reports from this admission including: ECHO    Recent Cultures (last 7 days):         Last 24 Hours Medication List:    Current Facility-Administered Medications   Medication Dose Route Frequency Provider Last Rate    acetaminophen  975 mg Oral Q8H [facility] Tanesha Rose,       amLODIPine  10 mg Oral Daily Mitesh Burt MD      aspirin  81 mg Oral Daily Mitesh Burt MD      atorvastatin  40 mg Oral QPM Mitesh Burt MD      carvedilol  12.5 mg Oral BID With Meals Mitesh Burt MD      chlorhexidine  15 mL Mouth/Throat Q12H [facility] Mitesh Burt MD      clopidogrel  75 mg Oral Daily Betsy Angulo PA-C      diphenhydrAMINE  25 mg Oral HS PRN William Ruvalcaba,       enoxaparin  40 mg Subcutaneous Daily Mitesh Burt MD      hydrALAZINE  5 mg Intravenous Q4H PRN Lisa Cuenca MD      labetalol  10 mg Intravenous Q4H PRN Lisa Cuenca MD      lisinopril  10 mg Oral Daily Gerardo Ho,       melatonin  6 mg Oral HS Mitesh Burt MD      methadone  105 mg Oral Daily Mitesh Burt MD      methadone  120 mg Oral QPM Mitesh Burt MD      nicotine  21 mg Transdermal Daily Lisa Cuenca MD      polyethylene glycol  17 g Oral Daily PRN Mitesh Burt MD      senna-docusate sodium  1 tablet Oral BID PRN Mitesh Burt MD          Today, Patient Was Seen By: Lisa Cuenca MD    **Please Note: This note may have been constructed using a voice recognition system.**

## 2024-03-22 NOTE — TREATMENT PLAN
Was reached out by Lisa Cuenca via TT in regards to anterior communicating artery aneurysm seen on imaging.    Patient originally presented to [facility] on 3/17/2024 with left-sided weakness.  History of substance abuse on methadone since 2015 and hypertension with noncompliance with medication who presented as a stroke alert.  He also had decreased sensation in the left arm and leg.  Systolic blood pressures were in the 200s upon presentation.  CTA demonstrated left PCA occlusion and a 2 mm SHARAD aneurysm at the junction of A1/A2.  Per chart review patient's case was discussed with Dr. Aly, no acute intervention.  He was started on Cardene and given aspirin 325 mg and transferred to [facility] for ongoing management.  Per neurology he was transition to aspirin 81 mg and Plavix.  Patient also found to have descending thoracic aneurysm.  Also history of polycystic kidney disease.    Imaging reviewed with Dr. Aly: CTA demonstrates 2 mm aneurysm of the anterior communicating artery at the junction of the left M1 and M2 segments.    Please discuss modifible risk factors with patient to include smoking (current everyday smoker), hypertension, and hyperlipidemia.  Please speak with patient about the importance of smoking cessation  Recommend close outpatient follow-up for blood pressure management.  No neurosurgical intervention indicated during this hospitalization.  Aneurysm was found incidentally and does not correlate with patient's presenting symptoms.  Recommend outpatient follow-up for incidental SHARAD aneurysm.  Will help and coordinate outpatient follow-up.  Neurosurgery will sign off, call with any further question or concerns.

## 2024-03-22 NOTE — PROGRESS NOTES
NEPHROLOGY PROGRESS NOTE   DEANNA Ashraf 33 y.o. male MRN: [id number]  Unit/Bed#: [facility] 726-01 Encounter: [id number]      ASSESSMENT/PLAN:  Polycystic kidney disease: Noted bilateral renal cyst on imaging and cerebral aneurysm.  Mother also with PCKD  Creatinine stable at 1.08 with EGFR 88  Hypertensive emergency:  Pending: Plasma metanephrines, renal and/aldosterone  Abnormal renal artery Doppler suspicious for ESTELITA   Lisinopril increased to 10 mg daily yesterday so we will trend response  Continue amlodipine and carvedilol  Recent CVA: MRI showing acute infarct in right posterior caudate and chronic infarct with noted multiple chronic microhemorrhages  Suspicion for left renal artery stenosis: Velocities 528  Prior CTA with no evidence of stenosis    Plan Summary:   Monitor BP today with recent increase lisinopril     SUBJECTIVE:  Feeling okay today.  Denies chest pain or shortness of breath.    OBJECTIVE:  Current Weight: Weight - Scale: 84.5 kg (186 lb 4.6 oz)  Vitals:    03/22/24 0821   BP: (!) 189/108   Pulse: 64   Resp:    Temp: 98.9 °F (37.2 °C)   SpO2: 99%       Intake/Output Summary (Last 24 hours) at 3/22/2024 0933  Last data filed at 3/21/2024 2101  Gross per 24 hour   Intake 437 ml   Output 775 ml   Net -338 ml       General:  appears comfortable and in no acute distress   Skin:  No rash  Eyes:  Sclerae anicteric, no periorbital edema   ENT:  Moist mucous membranes  Neck:  Trachea midline, symmetric   Chest:  Clear to auscultation bilaterally with no wheezes, rales or rhonchi  CVS:  Regular rate and rhythm  Abdomen:  Soft, nontender, nondistended  Neuro:  Awake and alert  Psych:  Appropriate affect  Extremities: no lower extremity edema       Medications:  Scheduled Meds:  Current Facility-Administered Medications   Medication Dose Route Frequency Provider Last Rate    acetaminophen  975 mg Oral Q8H ARYA Rose DO      amLODIPine  10 mg Oral Daily Mitesh Burt MD      aspirin  81 mg Oral  Daily Mitesh Burt MD      atorvastatin  40 mg Oral QPM Mitesh Burt MD      carvedilol  12.5 mg Oral BID With Meals Mitesh Burt MD      chlorhexidine  15 mL Mouth/Throat Q12H [facility] Mitesh Burt MD      clopidogrel  75 mg Oral Daily Betsy Angulo PA-C      diphenhydrAMINE  25 mg Oral HS PRN William Ruvalcaba,       enoxaparin  40 mg Subcutaneous Daily Mitesh Burt MD      hydrALAZINE  5 mg Intravenous Q4H PRN Lisa Cuenca MD      labetalol  10 mg Intravenous Q4H PRN Lisa Cuenca MD      lisinopril  10 mg Oral Daily Gerardo Ho,       melatonin  6 mg Oral HS Mitesh Burt MD      methadone  105 mg Oral Daily Mitesh Burt MD      methadone  120 mg Oral QPM Mitesh Burt MD      nicotine  21 mg Transdermal Daily Lisa Cuenca MD      polyethylene glycol  17 g Oral Daily PRN Mitesh Burt MD      senna-docusate sodium  1 tablet Oral BID PRN Mitesh Burt MD         PRN Meds:.  diphenhydrAMINE    hydrALAZINE    labetalol    polyethylene glycol    senna-docusate sodium      Laboratory Results:  Results from last 7 days   Lab Units 03/21/24  0735 03/20/24  0654 03/19/24  1417 03/19/24  0509 03/18/24  0501 03/17/24  1226   WBC Thousand/uL 8.07 6.25  --  7.25 7.09 7.01   HEMOGLOBIN g/dL 12.7 12.8  --  12.7 13.8 12.9   HEMATOCRIT % 39.1 38.7  --  38.7 41.6 38.7   PLATELETS Thousands/uL 186 175  --  169 171 150   SODIUM mmol/L 140 135 137 139 137 134*   POTASSIUM mmol/L 3.8 5.1 3.8 3.4* 3.5 3.7   CHLORIDE mmol/L 105 103 104 104 100 101   CO2 mmol/L 27 24 26 26 25 28   BUN mg/dL 28* 23 19 19 18 17   CREATININE mg/dL 1.08 1.16 0.88 0.90 1.07 1.08   CALCIUM mg/dL 9.1 9.1 9.5 9.0 9.2 9.0   MAGNESIUM mg/dL 2.2 2.7  --  2.2 2.1  --    PHOSPHORUS mg/dL  --  2.8 3.0 2.3* 3.7  --

## 2024-03-23 VITALS
SYSTOLIC BLOOD PRESSURE: 162 MMHG | HEART RATE: 65 BPM | HEIGHT: 69 IN | WEIGHT: 184.97 LBS | DIASTOLIC BLOOD PRESSURE: 93 MMHG | OXYGEN SATURATION: 97 % | BODY MASS INDEX: 27.4 KG/M2 | TEMPERATURE: 98.7 F | RESPIRATION RATE: 16 BRPM

## 2024-03-23 PROBLEM — I16.1 HYPERTENSIVE EMERGENCY: Status: RESOLVED | Noted: 2024-03-17 | Resolved: 2024-03-23

## 2024-03-23 LAB
ANION GAP SERPL CALCULATED.3IONS-SCNC: 5 MMOL/L (ref 4–13)
BUN SERPL-MCNC: 29 MG/DL (ref 5–25)
CALCIUM SERPL-MCNC: 9.3 MG/DL (ref 8.4–10.2)
CHLORIDE SERPL-SCNC: 100 MMOL/L (ref 96–108)
CO2 SERPL-SCNC: 34 MMOL/L (ref 21–32)
CREAT SERPL-MCNC: 1.36 MG/DL (ref 0.6–1.3)
ERYTHROCYTE [DISTWIDTH] IN BLOOD BY AUTOMATED COUNT: 13.4 % (ref 11.6–15.1)
GFR SERPL CREATININE-BSD FRML MDRD: 66 ML/MIN/1.73SQ M
GLUCOSE SERPL-MCNC: 94 MG/DL (ref 65–140)
HCT VFR BLD AUTO: 38.3 % (ref 36.5–49.3)
HGB BLD-MCNC: 12.8 G/DL (ref 12–17)
MCH RBC QN AUTO: 28.8 PG (ref 26.8–34.3)
MCHC RBC AUTO-ENTMCNC: 33.4 G/DL (ref 31.4–37.4)
MCV RBC AUTO: 86 FL (ref 82–98)
PLATELET # BLD AUTO: 180 THOUSANDS/UL (ref 149–390)
PMV BLD AUTO: 11.3 FL (ref 8.9–12.7)
POTASSIUM SERPL-SCNC: 3.8 MMOL/L (ref 3.5–5.3)
RBC # BLD AUTO: 4.44 MILLION/UL (ref 3.88–5.62)
SODIUM SERPL-SCNC: 139 MMOL/L (ref 135–147)
WBC # BLD AUTO: 5.83 THOUSAND/UL (ref 4.31–10.16)

## 2024-03-23 PROCEDURE — 80048 BASIC METABOLIC PNL TOTAL CA: CPT

## 2024-03-23 PROCEDURE — 85027 COMPLETE CBC AUTOMATED: CPT

## 2024-03-23 PROCEDURE — 99239 HOSP IP/OBS DSCHRG MGMT >30: CPT | Performed by: INTERNAL MEDICINE

## 2024-03-23 PROCEDURE — 99232 SBSQ HOSP IP/OBS MODERATE 35: CPT | Performed by: INTERNAL MEDICINE

## 2024-03-23 RX ORDER — ASPIRIN 81 MG/1
81 TABLET, CHEWABLE ORAL DAILY
Qty: 30 TABLET | Refills: 0 | Status: SHIPPED | OUTPATIENT
Start: 2024-03-23 | End: 2024-03-23

## 2024-03-23 RX ORDER — CARVEDILOL 12.5 MG/1
12.5 TABLET ORAL 2 TIMES DAILY WITH MEALS
Qty: 30 TABLET | Refills: 0 | Status: SHIPPED | OUTPATIENT
Start: 2024-03-23 | End: 2024-03-23

## 2024-03-23 RX ORDER — CLOPIDOGREL BISULFATE 75 MG/1
75 TABLET ORAL DAILY
Qty: 18 TABLET | Refills: 0 | Status: SHIPPED | OUTPATIENT
Start: 2024-03-23 | End: 2024-03-23

## 2024-03-23 RX ORDER — LISINOPRIL 10 MG/1
10 TABLET ORAL EVERY 12 HOURS
Qty: 60 TABLET | Refills: 0 | Status: SHIPPED | OUTPATIENT
Start: 2024-03-23

## 2024-03-23 RX ORDER — ATORVASTATIN CALCIUM 40 MG/1
40 TABLET, FILM COATED ORAL EVERY EVENING
Qty: 30 TABLET | Refills: 0 | Status: SHIPPED | OUTPATIENT
Start: 2024-03-23 | End: 2024-03-23

## 2024-03-23 RX ORDER — AMLODIPINE BESYLATE 10 MG/1
10 TABLET ORAL DAILY
Qty: 30 TABLET | Refills: 0 | Status: SHIPPED | OUTPATIENT
Start: 2024-03-23 | End: 2024-03-23

## 2024-03-23 RX ORDER — METHADONE HYDROCHLORIDE 10 MG/5ML
110 SOLUTION ORAL 2 TIMES DAILY
Start: 2024-03-23 | End: 2024-04-02

## 2024-03-23 RX ORDER — AMLODIPINE BESYLATE 10 MG/1
10 TABLET ORAL DAILY
Qty: 30 TABLET | Refills: 0 | Status: SHIPPED | OUTPATIENT
Start: 2024-03-23

## 2024-03-23 RX ORDER — ASPIRIN 81 MG/1
81 TABLET, CHEWABLE ORAL DAILY
Qty: 30 TABLET | Refills: 0 | Status: SHIPPED | OUTPATIENT
Start: 2024-03-23

## 2024-03-23 RX ORDER — LISINOPRIL 10 MG/1
10 TABLET ORAL EVERY 12 HOURS
Qty: 60 TABLET | Refills: 0 | Status: SHIPPED | OUTPATIENT
Start: 2024-03-23 | End: 2024-03-23

## 2024-03-23 RX ORDER — CLOPIDOGREL BISULFATE 75 MG/1
75 TABLET ORAL DAILY
Qty: 18 TABLET | Refills: 0 | Status: SHIPPED | OUTPATIENT
Start: 2024-03-23 | End: 2024-04-10

## 2024-03-23 RX ORDER — ATORVASTATIN CALCIUM 40 MG/1
40 TABLET, FILM COATED ORAL EVERY EVENING
Qty: 30 TABLET | Refills: 0 | Status: SHIPPED | OUTPATIENT
Start: 2024-03-23

## 2024-03-23 RX ORDER — CARVEDILOL 12.5 MG/1
12.5 TABLET ORAL 2 TIMES DAILY WITH MEALS
Qty: 30 TABLET | Refills: 0 | Status: SHIPPED | OUTPATIENT
Start: 2024-03-23

## 2024-03-23 RX ORDER — METHADONE HYDROCHLORIDE 10 MG/5ML
110 SOLUTION ORAL 2 TIMES DAILY
Start: 2024-03-23 | End: 2024-03-23

## 2024-03-23 RX ORDER — NICOTINE 21 MG/24HR
1 PATCH, TRANSDERMAL 24 HOURS TRANSDERMAL DAILY
Qty: 28 PATCH | Refills: 0 | Status: SHIPPED | OUTPATIENT
Start: 2024-03-24

## 2024-03-23 RX ORDER — SODIUM CHLORIDE, SODIUM GLUCONATE, SODIUM ACETATE, POTASSIUM CHLORIDE, MAGNESIUM CHLORIDE, SODIUM PHOSPHATE, DIBASIC, AND POTASSIUM PHOSPHATE .53; .5; .37; .037; .03; .012; .00082 G/100ML; G/100ML; G/100ML; G/100ML; G/100ML; G/100ML; G/100ML
500 INJECTION, SOLUTION INTRAVENOUS ONCE
Status: COMPLETED | OUTPATIENT
Start: 2024-03-23 | End: 2024-03-23

## 2024-03-23 RX ADMIN — CARVEDILOL 12.5 MG: 12.5 TABLET, FILM COATED ORAL at 10:24

## 2024-03-23 RX ADMIN — LISINOPRIL 10 MG: 10 TABLET ORAL at 10:25

## 2024-03-23 RX ADMIN — CARVEDILOL 12.5 MG: 12.5 TABLET, FILM COATED ORAL at 15:18

## 2024-03-23 RX ADMIN — CLOPIDOGREL BISULFATE 75 MG: 75 TABLET ORAL at 10:25

## 2024-03-23 RX ADMIN — AMLODIPINE BESYLATE 10 MG: 10 TABLET ORAL at 10:24

## 2024-03-23 RX ADMIN — CHLORHEXIDINE GLUCONATE 15 ML: 1.2 SOLUTION ORAL at 10:25

## 2024-03-23 RX ADMIN — NICOTINE 21 MG: 21 PATCH, EXTENDED RELEASE TRANSDERMAL at 10:26

## 2024-03-23 RX ADMIN — ENOXAPARIN SODIUM 40 MG: 40 INJECTION SUBCUTANEOUS at 10:24

## 2024-03-23 RX ADMIN — SODIUM CHLORIDE, SODIUM GLUCONATE, SODIUM ACETATE, POTASSIUM CHLORIDE, MAGNESIUM CHLORIDE, SODIUM PHOSPHATE, DIBASIC, AND POTASSIUM PHOSPHATE 500 ML: .53; .5; .37; .037; .03; .012; .00082 INJECTION, SOLUTION INTRAVENOUS at 10:36

## 2024-03-23 RX ADMIN — METHADONE HYDROCHLORIDE 105 MG: 10 CONCENTRATE ORAL at 10:23

## 2024-03-23 RX ADMIN — ASPIRIN 81 MG CHEWABLE TABLET 81 MG: 81 TABLET CHEWABLE at 10:24

## 2024-03-23 RX ADMIN — ACETAMINOPHEN 975 MG: 325 TABLET, FILM COATED ORAL at 15:18

## 2024-03-23 NOTE — PLAN OF CARE
Problem: PAIN - ADULT  Goal: Verbalizes/displays adequate comfort level or baseline comfort level  Description: Interventions:  - Encourage patient to monitor pain and request assistance  - Assess pain using appropriate pain scale  - Administer analgesics based on type and severity of pain and evaluate response  - Implement non-pharmacological measures as appropriate and evaluate response  - Consider cultural and social influences on pain and pain management  - Notify physician/advanced practitioner if interventions unsuccessful or patient reports new pain  Outcome: Progressing     Problem: INFECTION - ADULT  Goal: Absence or prevention of progression during hospitalization  Description: INTERVENTIONS:  - Assess and monitor for signs and symptoms of infection  - Monitor lab/diagnostic results  - Monitor all insertion sites, i.e. indwelling lines, tubes, and drains  - Monitor endotracheal if appropriate and nasal secretions for changes in amount and color  - [place] appropriate cooling/warming therapies per order  - Administer medications as ordered  - Instruct and encourage patient and family to use good hand hygiene technique  - Identify and instruct in appropriate isolation precautions for identified infection/condition  Outcome: Progressing  Goal: Absence of fever/infection during neutropenic period  Description: INTERVENTIONS:  - Monitor WBC    Outcome: Progressing     Problem: SAFETY ADULT  Goal: Patient will remain free of falls  Description: INTERVENTIONS:  - Educate patient/family on patient safety including physical limitations  - Instruct patient to call for assistance with activity   - Consult OT/PT to assist with strengthening/mobility   - Keep Call bell within reach  - Keep bed low and locked with side rails adjusted as appropriate  - Keep care items and personal belongings within reach  - Initiate and maintain comfort rounds  - Make Fall Risk Sign visible to staff  - Offer Toileting every 2 Hours,  in advance of need  - Initiate/Maintain bed alarm  - Obtain necessary fall risk management equipment: non skid footwear  - Apply yellow socks and bracelet for high fall risk patients  - Consider moving patient to room near nurses station  Outcome: Progressing  Goal: Maintain or return to baseline ADL function  Description: INTERVENTIONS:  -  Assess patient's ability to carry out ADLs; assess patient's baseline for ADL function and identify physical deficits which impact ability to perform ADLs (bathing, care of mouth/teeth, toileting, grooming, dressing, etc.)  - Assess/evaluate cause of self-care deficits   - Assess range of motion  - Assess patient's mobility; develop plan if impaired  - Assess patient's need for assistive devices and provide as appropriate  - Encourage maximum independence but intervene and supervise when necessary  - Involve family in performance of ADLs  - Assess for home care needs following discharge   - Consider OT consult to assist with ADL evaluation and planning for discharge  - Provide patient education as appropriate  Outcome: Progressing  Goal: Maintains/Returns to pre admission functional level  Description: INTERVENTIONS:  - Perform AM-PAC 6 Click Basic Mobility/ Daily Activity assessment daily.  - Set and communicate daily mobility goal to care team and patient/family/caregiver.   - Collaborate with rehabilitation services on mobility goals if consulted  - Perform Range of Motion 3 times a day.  - Reposition patient every 2 hours.  - Dangle patient 3 times a day  - Stand patient 3 times a day  - Ambulate patient 3 times a day  - Out of bed to chair 3 times a day   - Out of bed for meals 3 times a day  - Out of bed for toileting  - Record patient progress and toleration of activity level   Outcome: Progressing     Problem: DISCHARGE PLANNING  Goal: Discharge to home or other facility with appropriate resources  Description: INTERVENTIONS:  - Identify barriers to discharge w/patient  and caregiver  - Arrange for needed discharge resources and transportation as appropriate  - Identify discharge learning needs (meds, wound care, etc.)  - Arrange for interpretive services to assist at discharge as needed  - Refer to Case Management Department for coordinating discharge planning if the patient needs post-hospital services based on physician/advanced practitioner order or complex needs related to functional status, cognitive ability, or social support system  Outcome: Progressing     Problem: Knowledge Deficit  Goal: Patient/family/caregiver demonstrates understanding of disease process, treatment plan, medications, and discharge instructions  Description: Complete learning assessment and assess knowledge base.  Interventions:  - Provide teaching at level of understanding  - Provide teaching via preferred learning methods  Outcome: Progressing     Problem: Neurological Deficit  Goal: Neurological status is stable or improving  Description: Interventions:  - Monitor and assess patient's level of consciousness, motor function, sensory function, and level of assistance needed for ADLs.   - Monitor and report changes from baseline. Collaborate with interdisciplinary team to initiate plan and implement interventions as ordered.   - Provide and maintain a safe environment.  - Consider seizure precautions.  - Consider fall precautions.  - Consider aspiration precautions.  - Consider bleeding precautions.  Outcome: Progressing     Problem: Activity Intolerance/Impaired Mobility  Goal: Mobility/activity is maintained at optimum level for patient  Description: Interventions:  - Assess and monitor patient  barriers to mobility and need for assistive/adaptive devices.  - Assess patient's emotional response to limitations.  - Collaborate with interdisciplinary team and initiate plans and interventions as ordered.  - Encourage independent activity per ability.  - Maintain proper body alignment.  - Perform  active/passive rom as tolerated/ordered.  - Plan activities to conserve energy.  - Turn patient as appropriate  Outcome: Progressing     Problem: Communication Impairment  Goal: Ability to express needs and understand communication  Description: Assess patient's communication skills and ability to understand information.  Patient will demonstrate use of effective communication techniques, alternative methods of communication and understanding even if not able to speak.     - Encourage communication and provide alternate methods of communication as needed.  - Collaborate with case management/ for discharge needs.  - Include patient/family/caregiver in decisions related to communication.  Outcome: Progressing     Problem: Potential for Aspiration  Goal: Non-ventilated patient's risk of aspiration is minimized  Description: Assess and monitor vital signs, respiratory status, and labs (WBC).  Monitor for signs of aspiration (tachypnea, cough, rales, wheezing, cyanosis, fever).    - Assess and monitor patient's ability to swallow.  - Place patient up in chair to eat if possible.  - HOB up at 90 degrees to eat if unable to get patient up into chair.  - Supervise patient during oral intake.   - Instruct patient/ family to take small bites.  - Instruct patient/ family to take small single sips when taking liquids.  - Follow patient-specific strategies generated by speech pathologist.  Outcome: Progressing     Problem: Nutrition  Goal: Nutrition/Hydration status is improving  Description: Monitor and assess patient's nutrition/hydration status for malnutrition (ex- brittle hair, bruises, dry skin, pale skin and conjunctiva, muscle wasting, smooth red tongue, and disorientation). Collaborate with interdisciplinary team and initiate plan and interventions as ordered.  Monitor patient's weight and dietary intake as ordered or per policy. Utilize nutrition screening tool and intervene per policy. Determine  patient's food preferences and provide high-protein, high-caloric foods as appropriate.     - Assist patient with eating.  - Allow adequate time for meals.  - Encourage patient to take dietary supplement as ordered.  - Collaborate with clinical nutritionist.  - Include patient/family/caregiver in decisions related to nutrition.  Outcome: Progressing     Problem: Prexisting or High Potential for Compromised Skin Integrity  Goal: Skin integrity is maintained or improved  Description: INTERVENTIONS:  - Identify patients at risk for skin breakdown  - Assess and monitor skin integrity  - Assess and monitor nutrition and hydration status  - Monitor labs   - Assess for incontinence   - Turn and reposition patient  - Assist with mobility/ambulation  - Relieve pressure over bony prominences  - Avoid friction and shearing  - Provide appropriate hygiene as needed including keeping skin clean and dry  - Evaluate need for skin moisturizer/barrier cream  - Collaborate with interdisciplinary team   - Patient/family teaching  - Consider wound care consult   Outcome: Progressing     Problem: Nutrition/Hydration-ADULT  Goal: Nutrient/Hydration intake appropriate for improving, restoring or maintaining nutritional needs  Description: Monitor and assess patient's nutrition/hydration status for malnutrition. Collaborate with interdisciplinary team and initiate plan and interventions as ordered.  Monitor patient's weight and dietary intake as ordered or per policy. Utilize nutrition screening tool and intervene as necessary. Determine patient's food preferences and provide high-protein, high-caloric foods as appropriate.     INTERVENTIONS:  - Monitor oral intake, urinary output, labs, and treatment plans  - Assess nutrition and hydration status and recommend course of action  - Evaluate amount of meals eaten  - Assist patient with eating if necessary   - Allow adequate time for meals  - Recommend/ encourage appropriate diets, oral  nutritional supplements, and vitamin/mineral supplements  - Order, calculate, and assess calorie counts as needed  - Recommend, monitor, and adjust tube feedings and TPN/PPN based on assessed needs  - Assess need for intravenous fluids  - Provide specific nutrition/hydration education as appropriate  - Include patient/family/caregiver in decisions related to nutrition  Outcome: Progressing

## 2024-03-23 NOTE — INCIDENTAL FINDINGS
The following findings require follow up:  Radiographic finding   Finding:     CTA dissection protocol chest/abdomen/pelvis: 5.0 cm fusiform aneurysm of the descending aorta at the level of the hiatus. No evidence of dissection. Consider vascular surgery referral., Multiple small bilateral renal cysts., The study was marked in EPIC for immediate notification., Workstation performed: [id number]   Follow up required: Vascular Surgery Follow up    Follow up should be done within 3 months    Please notify the following clinician to assist with the follow up: Robin Sandoval

## 2024-03-23 NOTE — ASSESSMENT & PLAN NOTE
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 3/19 Renal US: Left renal artery  60 - 99% stenosis was identified in the tortuous main renal artery. Patent left renal vein. Normal R renal artery/vein.   Nephrology following, have discussed with neurology that goal blood pressure should be normotension especially with concurrent fusiform descending aortic aneurysm, will start NRT to manage risk factors  f/u aldosterone/renin, metanephrine levels  Continue current regimen with Coreg 12.5 twice daily, Norvasc 10 daily, lisinopril 10 BID  Patient will follow up with nephro as an o/p   Repeat BMP in 1 week

## 2024-03-23 NOTE — PROGRESS NOTES
NEPHROLOGY PROGRESS NOTE   DEANNA Ashraf 33 y.o. male MRN: [id number]  Unit/Bed#: [facility] 726-01 Encounter: [id number]  Reason for Consult: Polycystic kidney disease    ASSESSMENT/PLAN:  Polycystic kidney disease with noted bilateral renal cysts, cerebral aneurysm as well as a family history of polycystic kidney disease in the mother.  Updated serum creatinine of 1.3, likely hemodynamic changes, to be expected given improvement of blood pressure control.  Recommend 500 cc bolus.  Hypertensive urgency with blood pressure slowly improving, renal Doppler suspicious for renal artery stenosis although CTA without significant stenosis of the aorta or major branch vessels.  Cerebral aneurysm, of the anterior communicating artery, recommend follow-up with neurosurgery.    Overall blood pressure has improved significantly, would suspect some degree of renal dysfunction given likely impaired renal autoregulation.  Recommend 500 cc bolus  Recommend continuation of current antihypertensive regimen  Okay to discharge home post bolus.  Recommend repeat labs early next week.  Discussed with primary service.    SUBJECTIVE:  Seen and examined.  Patient feeling well.  Offers no new complaints.  Denies any chest pain shortness of breath.  No abdominal pain.    Review of Systems    OBJECTIVE:  Current Weight: Weight - Scale: 83.9 kg (184 lb 15.5 oz)  Vitals:    03/22/24 2211 03/22/24 2211 03/23/24 0550 03/23/24 0748   BP: 146/84 146/84  141/91   Pulse: 65 64  61   Resp:    16   Temp:    98.7 °F (37.1 °C)   TempSrc:       SpO2: 99% 98%  98%   Weight:   83.9 kg (184 lb 15.5 oz)    Height:           Intake/Output Summary (Last 24 hours) at 3/23/2024 1207  Last data filed at 3/22/2024 1700  Gross per 24 hour   Intake 230 ml   Output --   Net 230 ml       Physical Exam  Constitutional:       Appearance: He is not ill-appearing.   Eyes:      General: No scleral icterus.  Cardiovascular:      Rate and Rhythm: Normal rate and regular rhythm.    Pulmonary:      Effort: Pulmonary effort is normal.      Breath sounds: Normal breath sounds.   Abdominal:      General: There is no distension.      Palpations: Abdomen is soft.   Musculoskeletal:      Right lower leg: No edema.      Left lower leg: No edema.   Skin:     General: Skin is warm and dry.      Findings: No rash.   Neurological:      Mental Status: He is alert and oriented to person, place, and time.         Medications:    Current Facility-Administered Medications:     acetaminophen (TYLENOL) tablet 975 mg, 975 mg, Oral, Q8H ARYA, Trinoit Paige-Marcoss, DO, 975 mg at 03/22/24 2216    amLODIPine (NORVASC) tablet 10 mg, 10 mg, Oral, Daily, Mitesh Burt MD, 10 mg at 03/23/24 1024    aspirin chewable tablet 81 mg, 81 mg, Oral, Daily, Mitesh Burt MD, 81 mg at 03/23/24 1024    atorvastatin (LIPITOR) tablet 40 mg, 40 mg, Oral, QPM, Mitesh Burt MD, 40 mg at 03/22/24 1737    carvedilol (COREG) tablet 12.5 mg, 12.5 mg, Oral, BID With Meals, Mitesh Burt MD, 12.5 mg at 03/23/24 1024    chlorhexidine (PERIDEX) 0.12 % oral rinse 15 mL, 15 mL, Mouth/Throat, Q12H ARYA, Mitesh Burt MD, 15 mL at 03/23/24 1025    clopidogrel (PLAVIX) tablet 75 mg, 75 mg, Oral, Daily, Betsy Angulo PA-C, 75 mg at 03/23/24 1025    diphenhydrAMINE (BENADRYL) tablet 25 mg, 25 mg, Oral, HS PRN, William Hadeed, DO, 25 mg at 03/22/24 2217    enoxaparin (LOVENOX) subcutaneous injection 40 mg, 40 mg, Subcutaneous, Daily, Mitesh Burt MD, 40 mg at 03/23/24 1024    hydrALAZINE (APRESOLINE) injection 5 mg, 5 mg, Intravenous, Q4H PRN, Lisa Cuenca MD, 5 mg at 03/21/24 1227    labetalol (NORMODYNE) injection 10 mg, 10 mg, Intravenous, Q4H PRN, Lisa Cuenca MD, 10 mg at 03/21/24 2018    lisinopril (ZESTRIL) tablet 10 mg, 10 mg, Oral, Q12H, Tanesha Rose DO, 10 mg at 03/23/24 1025    melatonin tablet 6 mg, 6 mg, Oral, HS, Mitesh Burt MD, 6 mg at 03/22/24 2216    methadone (DOLOPHINE) oral concentrated  solution 105 mg, 105 mg, Oral, Daily, Mitesh Burt MD, 105 mg at 03/23/24 1023    methadone (DOLOPHINE) tablet 120 mg, 120 mg, Oral, QPM, Mitesh Burt MD, 120 mg at 03/22/24 1813    multi-electrolyte (ISOLYTE-S PH 7.4) bolus 500 mL, 500 mL, Intravenous, Once, Gerardo Ho, DO, Last Rate: 100 mL/hr at 03/23/24 1036, 500 mL at 03/23/24 1036    nicotine (NICODERM CQ) 21 mg/24 hr TD 24 hr patch 21 mg, 21 mg, Transdermal, Daily, Lisa Cuenca MD, 21 mg at 03/23/24 1026    polyethylene glycol (MIRALAX) packet 17 g, 17 g, Oral, Daily PRN, Mitesh Burt MD    senna-docusate sodium (SENOKOT S) 8.6-50 mg per tablet 1 tablet, 1 tablet, Oral, BID PRN, Mitesh Burt MD, 1 tablet at 03/20/24 1307    Laboratory Results:  Results from last 7 days   Lab Units 03/23/24  0550 03/22/24  1151 03/21/24  0735 03/20/24  0654 03/19/24  1417 03/19/24  0509 03/18/24  0501 03/17/24  1226   WBC Thousand/uL 5.83 7.64 8.07 6.25  --  7.25 7.09 7.01   HEMOGLOBIN g/dL 12.8 13.0 12.7 12.8  --  12.7 13.8 12.9   HEMATOCRIT % 38.3 39.1 39.1 38.7  --  38.7 41.6 38.7   PLATELETS Thousands/uL 180 189 186 175  --  169 171 150   POTASSIUM mmol/L 3.8 3.6 3.8 5.1 3.8 3.4* 3.5 3.7   CHLORIDE mmol/L 100 105 105 103 104 104 100 101   CO2 mmol/L 34* 28 27 24 26 26 25 28   BUN mg/dL 29* 27* 28* 23 19 19 18 17   CREATININE mg/dL 1.36* 1.17 1.08 1.16 0.88 0.90 1.07 1.08   CALCIUM mg/dL 9.3 9.0 9.1 9.1 9.5 9.0 9.2 9.0   MAGNESIUM mg/dL  --   --  2.2 2.7  --  2.2 2.1  --    PHOSPHORUS mg/dL  --   --   --  2.8 3.0 2.3* 3.7  --

## 2024-03-23 NOTE — ASSESSMENT & PLAN NOTE
Diagnosis: Left PCA occlusion   Patient presented with left-sided weakness/numbness, unsteady gait, 10/10 headache, and transient right-sided vision loss (resolved within minutes). /119 on arrival.  NIHSS 5 (left facial droop, LUE/LLE drift, LUE/LLE sensory loss, mild/moderate dysarthria). CT head showed chronic right basal ganglia lacunar infarct.  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  Patient was loaded with aspirin 324 mg once, started on Cardene drip, and transferred to [facility] for possible neurosurgery intervention. No neurosurgical intervention preformed   Repeat thrombosis panel 6 weeks  Continue ASA 81mg and Lipitor 40mg daily, started on Plavix for 21 days of DAPT then ASA monotherapy  Goal normotension - a/p below for HTN  Neurology follow up o/p  Will require zio patch - o/p cardiology referral    74M with h/o COPD, DANIEL on CPAP, Depression, HLD, HTN, CAD s/p PCI, CHB s/p PPM placed in 2015, bioprosthetic AVR, dementia, recent diagnosis of Hodgkin's lymphoma in Dec 2019 coming in for PICC line infection. Admit for PICC line removal/infection and RUE dvt.          PICC line infection.  Plan: -right bicep PICC line surrounded by erythema, edema, mild skin tear, tachycardic on initial presentation  -IR will remove PICC line today and replace with a new one in left arm, per IR, no need to hold heparin prior to procedure  -s/p received tylenol 650mg PO x1, zosyn x1 in the ED, will continue with zosyn    f/u blood cx's.     : Acute deep vein thrombosis (DVT) of other vein of right upper extremity.  Plan: -Doppler RUE: The right internal jugular, subclavian, brachial veins are patent and compressible where applicable.  The basilic vein demonstrates thrombus throughout its course. The cephalic vein is not seen. Radial and ulnar veins are also not seen. A PICC line is noted.   -continue with heparin gtt, IR consulted and no need to stop heparin for procedure  -consulted heme/onc: dr pitts  -consulted vascular: alfonso.      Lymphoma.  Plan: -recent diagnosis of Hodgkin's lymphoma in Dec 2019   -on chemo via PICC line on right arm, now infected  -s/p new left picc line insertion by IR today. Patient was scheduled for chemo tomorrow, will f/u heme/onc for rec  -heme/onc dr pitts.

## 2024-03-23 NOTE — ASSESSMENT & PLAN NOTE
Diagnosis: Substance use disorder  Home medication: methadone.   Methadone clinic: [facility] in [place], [place]; confirmed dose with them.   Pt denies illicit drug use.   UDS on 3/17 +methadone only.  Monitor for s/s of withdrawal  105 morning dose (oral solution), 120 night dose (tablets)

## 2024-03-23 NOTE — INCIDENTAL FINDINGS
MRI brain w wo contrast: MRI brain, - Acute infarct in right posterior caudate extending into right posterior limb of internal capsule., - No vessel wall thickening or vessel wall enhancement to suggest active vasculitis., - Chronic infarct in posterior putamen extending into right external capsule with hemosiderin deposition, likely sequelae of hemorrhagic infarct., - Chronic lacunar infarcts in bilateral roberto., - Multiple chronic microhemorrhages in bilateral roberto and to a lesser extent in right caudate, left putamen, and bilateral cerebellum. Findings suggestive of hypertensive arteriopathy., MRA head, - Probable chronic occlusion of proximal left PCA with faint flow-related signal more distally possibly due to collateral vessels. Moderate-to-severe short segment narrowing in bilateral MCA M2 branch vessels. Findings raise possibility of vasculitis , over atherosclerotic disease given patient's age. 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 Ricky Aly,

## 2024-03-25 ENCOUNTER — TELEPHONE (OUTPATIENT)
Dept: NEPHROLOGY | Facility: CLINIC | Age: 34
End: 2024-03-25

## 2024-03-25 ENCOUNTER — TELEPHONE (OUTPATIENT)
Dept: NEUROSURGERY | Facility: CLINIC | Age: 34
End: 2024-03-25

## 2024-03-25 DIAGNOSIS — I10 HYPERTENSION, UNSPECIFIED TYPE: ICD-10-CM

## 2024-03-25 DIAGNOSIS — Q61.3 POLYCYSTIC KIDNEY DISEASE: Primary | ICD-10-CM

## 2024-03-25 NOTE — TELEPHONE ENCOUNTER
----- Message from Gerardo Ho DO sent at 3/24/2024  6:54 AM EDT -----  Recently discharged from [facility].  Recommend repeat BMP in 1 week.  Patient will also need follow-up appointment within 4 to 6 weeks.  Thank you.

## 2024-03-25 NOTE — TELEPHONE ENCOUNTER
3/27/24 - CALLED PT AND LEFT VM CONFIRMING UPCOMING APPT IN [place].    3/25/24 - PT DISCHARGED TO HOME  4/12/24 SNPX W/MO HFU WITHIN NEXT FEW WKS    [facility]  Patient needs outpatient follow-up in the next few weeks joint appointment with Dr. Aly

## 2024-03-25 NOTE — TELEPHONE ENCOUNTER
Pt called back and has been scheduled for hospital follow up 03/26/2024. He is aware and confirmed

## 2024-03-25 NOTE — TELEPHONE ENCOUNTER
Called patient and left a voicemail asking to please call the office back to schedule a hospital follow up and to have blood work. If patient calls back please schedule patient with the next available appointment with an AP or . Labs have been added to the patients chart.

## 2024-03-26 ENCOUNTER — OFFICE VISIT (OUTPATIENT)
Dept: NEPHROLOGY | Facility: CLINIC | Age: 34
End: 2024-03-26
Payer: COMMERCIAL

## 2024-03-26 ENCOUNTER — APPOINTMENT (OUTPATIENT)
Dept: LAB | Facility: HOSPITAL | Age: 34
End: 2024-03-26
Payer: COMMERCIAL

## 2024-03-26 VITALS
HEIGHT: 69 IN | SYSTOLIC BLOOD PRESSURE: 180 MMHG | BODY MASS INDEX: 26.66 KG/M2 | DIASTOLIC BLOOD PRESSURE: 100 MMHG | WEIGHT: 180 LBS

## 2024-03-26 DIAGNOSIS — F17.200 SMOKING: ICD-10-CM

## 2024-03-26 DIAGNOSIS — I10 HYPERTENSION, UNSPECIFIED TYPE: Primary | ICD-10-CM

## 2024-03-26 DIAGNOSIS — I1A.0 RESISTANT HYPERTENSION: ICD-10-CM

## 2024-03-26 DIAGNOSIS — Q61.3 POLYCYSTIC KIDNEY DISEASE: ICD-10-CM

## 2024-03-26 DIAGNOSIS — Q61.3 CONGENITAL POLYCYSTIC KIDNEY DISEASE: ICD-10-CM

## 2024-03-26 DIAGNOSIS — I71.23 ANEURYSM OF DESCENDING THORACIC AORTA WITHOUT RUPTURE (HCC): ICD-10-CM

## 2024-03-26 DIAGNOSIS — I63.532 CEREBROVASCULAR ACCIDENT (CVA) DUE TO OCCLUSION OF LEFT POSTERIOR CEREBRAL ARTERY (HCC): ICD-10-CM

## 2024-03-26 LAB
ALDOST SERPL-MCNC: <1 NG/DL (ref 0–30)
ALDOST/RENIN PLAS-RTO: < {RATIO} (ref 0–30)
ANION GAP SERPL CALCULATED.3IONS-SCNC: 4 MMOL/L (ref 4–13)
BUN SERPL-MCNC: 21 MG/DL (ref 5–25)
CALCIUM SERPL-MCNC: 9.9 MG/DL (ref 8.4–10.2)
CHLORIDE SERPL-SCNC: 102 MMOL/L (ref 96–108)
CO2 SERPL-SCNC: 31 MMOL/L (ref 21–32)
CREAT SERPL-MCNC: 1.37 MG/DL (ref 0.6–1.3)
GFR SERPL CREATININE-BSD FRML MDRD: 66 ML/MIN/1.73SQ M
GLUCOSE SERPL-MCNC: 95 MG/DL (ref 65–140)
METANEPH FREE SERPL-MCNC: 32 PG/ML (ref 0–88)
NORMETANEPHRINE SERPL-MCNC: 31.4 PG/ML (ref 0–210.1)
POTASSIUM SERPL-SCNC: 4.4 MMOL/L (ref 3.5–5.3)
RENIN PLAS-CCNC: 39.05 NG/ML/HR (ref 0.17–5.38)
SODIUM SERPL-SCNC: 137 MMOL/L (ref 135–147)

## 2024-03-26 PROCEDURE — 80048 BASIC METABOLIC PNL TOTAL CA: CPT

## 2024-03-26 PROCEDURE — 99214 OFFICE O/P EST MOD 30 MIN: CPT | Performed by: INTERNAL MEDICINE

## 2024-03-26 PROCEDURE — 36415 COLL VENOUS BLD VENIPUNCTURE: CPT

## 2024-03-26 NOTE — PROGRESS NOTES
OFFICE FOLLOW UP - Nephrology   DEANNA Ashraf 33 y.o. male MRN: [id number]       ASSESSMENT and PLAN:  DEANNA was seen today for follow-up and hypertension.    Diagnoses and all orders for this visit:    Hypertension, unspecified type  -     Renal function panel; Future  -     Albumin / creatinine urine ratio; Future    Polycystic kidney disease  -     Renal function panel; Future  -     Albumin / creatinine urine ratio; Future    Smoking    Cerebrovascular accident (CVA) due to occlusion of left posterior cerebral artery (HCC)    Aneurysm of descending thoracic aorta without rupture (HCC)        33-year-old gentleman who was recently hospitalized transferred from [facility] to [facility] after patient presented to the hospital with left-sided weakness and unsteady gait, initially found to have hypertensive emergency, evaluation shows chronic right basal ganglia infarction with left PCA proximal occlusion also found to have a 2 mm anterior cerebral aneurysm other workup showed polycystic kidney disease with positive family history, nephrology was consulted during the hospitalization.  He was hospitalized between 3/18 to 3/23.  Patient today returns to the office for hospital follow-up.    #1 polycystic kidney disease with positive family history (mother).  Discussed with patient about importance to have better blood pressure control, avoid NSAIDs, stay well-hydrated.  Would like to see him back in the office in 3 months with repeat labs    #2 recent hospitalization with hypertensive emergency with CVA, blood pressure today elevated but patient did not took his blood pressure pills because was feeling sick.  Patient showed me his most recent home blood pressure readings at home, yesterday systolics in the 130s to 140s with diastolic in the 60s to 70s  Currently on amlodipine 10 mg daily, lisinopril 10 mg daily and carvedilol 12.5 mg twice a day.  We discussed about importance to follow low-salt diet, diet  information provided.  Avoid NSAIDs.  Check blood pressure at home at least 3 times a week and he will contact me with his home blood pressure readings after 2 weeks  Reinforced importance of quitting smoking  I would like to see him back in the office in 3 months with repeat labs    3 recent CVA, 2 mm aneurysm of the anterior communicating artery, has follow-up with neurosurgery.  Advised to continue close follow-up with neurology and neurosurgery    4. 5 cm ascending thoracic aortic, vascular saw him during recent hospitalization, advised to continue close follow-up  Discussed about importance to have good blood pressure control and to quit his smoking    5 concern for 60 to 99% stenosis of the left renal artery based on renal Doppler, advised to continue follow-up with vascular surgery.  We discussed about importance to quit smoking    6 tobacco abuse, reports up to a pack a day for over 20 years, discussed about importance of quitting smoking      Patient Instructions   Go for repeat blood test as recommended after recent hospitalization.  As we discussed in the office visit, your blood pressure today is elevated but after reviewing your most recent home blood pressure readings at home is better controlled.  It is very important to be compliant with your blood pressure medications and take it daily.  We discussed about importance to follow low-salt diet, diet information provided.  We discussed also about importance to quit smoking.  Avoid NSAIDs (no ibuprofen, Motrin, Advil, Aleve, naproxen).  Okay to take Tylenol or acetaminophen as needed for pain.  Close follow-up with your primary care doctor, neurologist, neurosurgeon, cardiologist as well as vascular surgeon.  I would like to see you back in the office in 2 to 3 months with repeat labs.  Keep checking your blood pressure at home at least 3 times a week and keep a log, please contact our office with your home blood pressure readings after 2 weeks.  Stay  well-hydrated.          HPI: DEANNA Ashraf is a 33 y.o. male who is here for Follow-up (PCKD) and Hypertension  .    Patient who recently presented to [facility] with left-sided weakness manage steady gait, found to have a CVA, was transferred to [facility],  initially found to have hypertensive emergency, evaluation shows chronic right basal ganglia infarction with left PCA proximal occlusion also found to have a 2 mm anterior cerebral aneurysm other workup showed polycystic kidney disease with positive family history, nephrology was consulted during the hospitalization.  He was hospitalized between 3/18 to 3/23    Patient currently has no complaints at this time other than left-sided weakness  Patient denies any chest pain, shortness of breath and swelling.   Denies any abdominal pain, no nausea, vomiting, no diarrhea or constipation.  Denies any urinary problems, no burning sensation or gross hematuria.  Reports prior history of tobacco abuse, used to smoke up to a pack a day for almost 20 years    The last blood work was done on 3/23/2024, which we have reviewed together.      ROS:   All the systems were reviewed and were negative except as documented on the HPI.    Allergies: Haloperidol    Medications:   Current Outpatient Medications:     amLODIPine (NORVASC) 10 mg tablet, Take 1 tablet (10 mg total) by mouth daily, Disp: 30 tablet, Rfl: 0    aspirin 81 mg chewable tablet, Chew 1 tablet (81 mg total) daily, Disp: 30 tablet, Rfl: 0    atorvastatin (LIPITOR) 40 mg tablet, Take 1 tablet (40 mg total) by mouth every evening, Disp: 30 tablet, Rfl: 0    carvedilol (COREG) 12.5 mg tablet, Take 1 tablet (12.5 mg total) by mouth 2 (two) times a day with meals, Disp: 30 tablet, Rfl: 0    clopidogrel (PLAVIX) 75 mg tablet, Take 1 tablet (75 mg total) by mouth daily for 18 days, Disp: 18 tablet, Rfl: 0    lisinopril (ZESTRIL) 10 mg tablet, Take 1 tablet (10 mg total) by mouth every 12 (twelve) hours, Disp:  "60 tablet, Rfl: 0    methadone (DOLOPHINE) 10 MG/5ML solution, Take 55 mL (110 mg total) by mouth 2 (two) times a day for 10 days 105mg in the morning 120mg in the evening Max Daily Amount: 220 mg, Disp: , Rfl:     nicotine (NICODERM CQ) 21 mg/24 hr TD 24 hr patch, Place 1 patch on the skin over 24 hours daily, Disp: 28 patch, Rfl: 0    Past Medical History:   Diagnosis Date    Hypertension 3/21/2024     No past surgical history on file.  No family history on file.   reports that he has been smoking cigarettes. He has a 5 pack-year smoking history. He has never used smokeless tobacco. He reports that he does not currently use alcohol. He reports that he does not currently use drugs.      Physical Exam:   Vitals:    03/26/24 0928 03/26/24 1002   BP: (!) 200/102 (!) 180/100   BP Location: Right arm Right arm   Patient Position: Sitting Sitting   Cuff Size: Standard    Weight: 81.6 kg (180 lb)    Height: 5' 9\" (1.753 m)      Body mass index is 26.58 kg/m².    General: cooperative, in not acute distress  Eyes: conjunctivae pink, anicteric sclerae  ENT: lips and mucous membranes moist  Neck: supple, no JVD  Chest: clear breath sounds bilateral, no crackles, ronchus or wheezings  CVS: distinct S1 & S2, normal rate, regular rhythm  Abdomen: soft, non-tender, non-distended, normoactive bowel sounds  Back: no CVA tenderness  Extremities: no edema of both legs  Skin: no rash  Neuro: awake, alert, oriented      Lab Results:    Results from last 7 days   Lab Units 03/23/24  0550 03/22/24  1151 03/21/24  0735 03/20/24  0654 03/19/24  1417   WBC Thousand/uL 5.83 7.64 8.07 6.25  --    HEMOGLOBIN g/dL 12.8 13.0 12.7 12.8  --    HEMATOCRIT % 38.3 39.1 39.1 38.7  --    PLATELETS Thousands/uL 180 189 186 175  --    SODIUM mmol/L 139 139 140 135 137   POTASSIUM mmol/L 3.8 3.6 3.8 5.1 3.8   CHLORIDE mmol/L 100 105 105 103 104   CO2 mmol/L 34* 28 27 24 26   BUN mg/dL 29* 27* 28* 23 19   CREATININE mg/dL 1.36* 1.17 1.08 1.16 0.88 " "  CALCIUM mg/dL 9.3 9.0 9.1 9.1 9.5   MAGNESIUM mg/dL  --   --  2.2 2.7  --    PHOSPHORUS mg/dL  --   --   --  2.8 3.0           Portions of the record may have been created with voice recognition software. Occasional wrong word or \"sound a like\" substitutions may have occurred due to the inherent limitations of voice recognition software. Read the chart carefully and recognize, using context, where substitutions have occurred.If you have any questions, please contact the dictating provider.  "

## 2024-03-26 NOTE — PATIENT INSTRUCTIONS
Go for repeat blood test as recommended after recent hospitalization.  As we discussed in the office visit, your blood pressure today is elevated but after reviewing your most recent home blood pressure readings at home is better controlled.  It is very important to be compliant with your blood pressure medications and take it daily.  We discussed about importance to follow low-salt diet, diet information provided.  We discussed also about importance to quit smoking.  Avoid NSAIDs (no ibuprofen, Motrin, Advil, Aleve, naproxen).  Okay to take Tylenol or acetaminophen as needed for pain.  Close follow-up with your primary care doctor, neurologist, neurosurgeon, cardiologist as well as vascular surgeon.  I would like to see you back in the office in 2 to 3 months with repeat labs.  Keep checking your blood pressure at home at least 3 times a week and keep a log, please contact our office with your home blood pressure readings after 2 weeks.  Stay well-hydrated.

## 2024-03-29 ENCOUNTER — TELEPHONE (OUTPATIENT)
Dept: NEUROLOGY | Facility: CLINIC | Age: 34
End: 2024-03-29

## 2024-03-29 NOTE — UTILIZATION REVIEW
NOTIFICATION OF ADMISSION DISCHARGE   This is a Notification of Discharge from [facility]. Please be advised that this patient has been discharge from our facility. Below you will find the admission and discharge date and time including the patient’s disposition.   UTILIZATION REVIEW CONTACT:  Yasmine Sullivan  Utilization   Network Utilization Review Department  Phone: [phone] x carefully listen to the prompts. All voicemails are confidential.  Email: [email or web address]     ADMISSION INFORMATION  PRESENTATION DATE: 3/18/2024  3:00 AM  OBERVATION ADMISSION DATE:   INPATIENT ADMISSION DATE: 3/18/24  3:00 AM   DISCHARGE DATE: 3/23/2024  3:54 PM   DISPOSITION:Home/Self Care    Network Utilization Review Department  ATTENTION: Please call with any questions or concerns to [phone] and carefully listen to the prompts so that you are directed to the right person. All voicemails are confidential.   For Discharge needs, contact Care Management DC Support Team at [phone] opt. [phone]  Send all requests for admission clinical reviews, approved or denied determinations and any other requests to dedicated fax number below belonging to the campus where the patient is receiving treatment. List of dedicated fax numbers for the Facilities:  FACILITY NAME UR FAX NUMBER   ADMISSION DENIALS (Administrative/Medical Necessity) [phone]   DISCHARGE SUPPORT TEAM ([facility]) [phone]   PARENT CHILD HEALTH (Maternity/NICU/Pediatrics) [phone]   [facility] [phone]   [facility] [phone]   [facility] [phone]   General acute hospital [phone]   [facility] [phone]   [facility] [phone]   [facility] [phone]   [facility] [phone]    [facility] [phone]   [facility] [phone]   [facility] [phone]   [facility] [phone]

## 2024-03-29 NOTE — TELEPHONE ENCOUNTER
Post CVA Discharge Follow Up  Hospitalization: 3/18/24-3/23/24    Called patient. Since discharge, he denies experiencing any new or worsening stroke-like symptoms. Reports he is doing well. Reports some tiredness.     Patient reports he continues to have the following symptoms: left sided weakness. He claims symptoms are improving since discharge.    He is ambulating independently as well as preforming his own ADLs. Patient manages his own medications, appointments, and affairs.    Reviewed appointments - encouraged patient to establish with a PCP for a HFU. Scheduled for the following:neurosurgery on 4/12/24, cardiology on 4/23/24.    Offered to schedule stroke hospital follow up appointment, patient is agreeable to this. Scheduled appointment for next available and placed patient on the wait list. Will also send a message to the MA to see if there is a sooner appointment.     Reviewed stroke-related medications with him.  Reports he is taking as prescribed with no medication side effects or signs of bleeding. He also verbalizes understanding of DAPT instructions/plan.     During this call, we reviewed stroke type, symptoms, personal risk factors and management, medications, and resources. Patient received the stroke education booklet while in the hospital.     As for risk factors, patient reports monitoring his BP at home. Last few BP readings: 140/74, 154/83, 142/72.  He recently quit smoking cigarettes.   Encouraged patient to follow a well balanced diet.     All of his questions were addressed. At the conclusion of the conversation, patient denies having any further questions or concerns.

## 2024-04-05 NOTE — PROGRESS NOTES
PT Evaluation     Today's date: 2024  Patient name: DEANNA Ashraf  : 1990  MRN: [id number]  Referring provider: Tanesha Rose DO  Dx:   Encounter Diagnosis     ICD-10-CM    1. Cerebrovascular accident (CVA) due to occlusion of left posterior cerebral artery (HCC)  I63.532       2. Gait abnormality  R26.9           Start Time: 1055  Stop Time: 1140  Total time in clinic (min): 45 minutes    Assessment  Assessment details: Pt is a 32 y/o male presenting to OP PT s/p R CVA and subsequent L hemiplegia. Upon evaluation, he presented gait abnormalities as seen below, L sided weakness compared to R side, decreased endurance, increased fall risk based on TUG and 5xSTS times, and decreased mobility. Due to his impairments, he is unable to drive or work, is limited to one floor in his home due to difficulty with stairs,  and is at an increased risk for falling. Pt would benefit from skilled PT in order to improve gait pattern, global strength, and overall functional mobility to return to PLOF.   Impairments: abnormal gait, abnormal muscle firing, activity intolerance, impaired physical strength, lacks appropriate home exercise program, pain with function and poor body mechanics    Goals  STG to be met within 4 weeks:  1. Pt will improve TUG time by 3 seconds to reduce fall risk.  2. Pt will improve 5xSTS time by 3 seconds to reduce fall risk.   3. Pt will improve LLE strength by 1/2 muscles grades to improve gait pattern.  4. Pt will be I in HEP.    LTG to be met within 8 weeks:  1. Pt will demonstrate improved gait pattern with decreased drop foot on L to reduce risk for falls.  2. Pt will improve LLE and LUE strength to WFL to reduce gait abnormalities.  3. Pt will demonstrate TUG and 5xSTS WFL to reduce risk for falls.  4. Pt will be I in HEP for DC.    Plan  Patient would benefit from: PT eval and skilled physical therapy  Planned therapy interventions: abdominal trunk stabilization, flexibility, gait  training, graded activity, graded exercise, home exercise program, manual therapy, neuromuscular re-education, patient education, postural training, self care, strengthening, stretching, therapeutic activities and therapeutic exercise  Frequency: 2x week  Duration in weeks: 8  Treatment plan discussed with: patient        Subjective Evaluation    History of Present Illness  Mechanism of injury: Patient reports he had a stroke on 3/17/24 resulting in L sided hemiplegia. Since his stroke, he has been more reliant on his family members but is still able to bathe and dress himself. He has most difficulty with walking and balance and has been very nauseous as a result of the medications he began taking since his stroke.   Patient Goals  Patient goals for therapy: improved balance, increased motion, increased strength, independence with ADLs/IADLs and return to work    Pain  No pain reported    Social Support  Steps to enter house: no  Stairs in house: no   Lives in: one-story house  Lives with: parents and spouse    Hand dominance: right    Treatments  Current treatment: physical therapy        Objective     Neurological Testing     Sensation   Cervical/Thoracic   Left   Diminished: light touch    Right   Intact: light touch    Lumbar   Left   Diminished: light touch    Right   Intact: light touch    Strength/Myotome Testing     Left Shoulder     Planes of Motion   Flexion: 4+   Extension: 4+   Abduction: 4+   Adduction: 4+   External rotation at 0°: 4+   Internal rotation at 0°: 4+     Right Shoulder   Normal muscle strength    Left Elbow   Flexion: 4+  Extension: 4+    Right Elbow   Normal strength    Lumbar     Right   Normal strength    Left Hip   Planes of Motion   Flexion: 4-  Extension: 4-  Abduction: 4-  Adduction: 4-  External rotation: 4-  Internal rotation: 4-    Left Knee   Flexion: 4-  Extension: 4-    Left Ankle/Foot   Dorsiflexion: 4-    Ambulation     Observational Gait   Gait: asymmetric   Increased left  stance time, right stance time and left swing time. Decreased walking speed, stride length, right swing time, left step length and right step length.   Left foot contact pattern: foot flat  Left arm swing: decreased  Right arm swing: decreased    Quality of Movement During Gait     Hip    Hip (Left): Positive hike.     Ankle    Ankle (Left): Positive foot drop.   Neuro Exam:     Functional outcomes   5x sit to stand: 12 (seconds)             Precautions: PMH CVA 3/17/24, AAA      Manuals 4/8       LLE HS, piriformis, hip abd, gastroc                                 Neuro Re-Ed        Sohail walks        Step over oksana        [place] walk out        Leticia chop up and down                                Ther Ex        Treadmill for endurance        Leg press        Bridge c ABD        Clamshells        Shoulder ER & ABD c Tband                        HEP instruction/ education 15'       Ther Activity                        Gait Training                        Modalities

## 2024-04-08 ENCOUNTER — EVALUATION (OUTPATIENT)
Dept: PHYSICAL THERAPY | Facility: CLINIC | Age: 34
End: 2024-04-08
Payer: COMMERCIAL

## 2024-04-08 DIAGNOSIS — I63.532 CEREBROVASCULAR ACCIDENT (CVA) DUE TO OCCLUSION OF LEFT POSTERIOR CEREBRAL ARTERY (HCC): Primary | ICD-10-CM

## 2024-04-08 DIAGNOSIS — R26.9 GAIT ABNORMALITY: ICD-10-CM

## 2024-04-08 PROCEDURE — 97535 SELF CARE MNGMENT TRAINING: CPT

## 2024-04-08 PROCEDURE — 97162 PT EVAL MOD COMPLEX 30 MIN: CPT

## 2024-04-11 ENCOUNTER — APPOINTMENT (OUTPATIENT)
Dept: PHYSICAL THERAPY | Facility: CLINIC | Age: 34
End: 2024-04-11
Payer: COMMERCIAL

## 2024-04-11 NOTE — ASSESSMENT & PLAN NOTE
Seen for evalaution of a 2mm ACoA aneurysm  Incidentally noted on evaluation of hypertensive crisis and CVA 3/2024 manifesting as left sided weakness/numbness. MRI showed an acute infarct in the right posterior caudate extending into the posterior limb of the internal capsule  This has mostly resolved with the exception of a left foot drop. He is working with PT. Complains of fatigue and intermittent headaches.  On aspirin 81 mg  Reports family history of aneurysm in his mother who had a rupture with subsequent treatment.  She has an additional aneurysm that is not being treated due to her medical comorbidities.  Working on smoking cessation.  Hypertensive today as he did not take his medications yet this morning, but at home pressures are usually 130s-140s systolic over 70s diastolic  History of substance abuse on methadone since 2015   Found to have a descending thoracic aortic aneurysm and PCKD.   Exam: CN II-XII grossly intact. BUE/RLE 5/5. LLE 5/5 except DF 3/5. LT intact, brisk left-sided reflexes with left Jim's    Imaging:  CTA head/neck 3/17/24: Mild smooth stenosis of the right cavernous internal carotid artery. There appears to be occlusion of the left posterior cerebral artery proximally with some distal reconstitution. At least one of the anterior M2 branches on the left demonstrates moderate atherosclerotic narrowing. 2 mm aneurysm of the anterior communicating artery at the junction of the left A1 and A2 segments    Plan:  Reviewed imaging with patient, wife, and Dr. Aly.  Small 2 mm ACoA aneurysm seen.  Discussed natural history of aneurysms and risk of rupture.  Discussed genetic component to aneurysms as well as modifiable risk factors such as smoking and HTN.  Given current size and location, risk of rupture is <1%/year per UCAS and <1%/5yrs per ISIUA.  Discussed screening of his brother, maternal siblings and cousins with MRA head. If negative, can be re-screened every 5 years.  Continue  to follow with neurology, nephrology, evaluation by vascular surgery.   Reviewed red flag s/s.  Follow up in 6 months with MRA head as joint appointment with Dr. Aly.  Call sooner with any questions or concerns.

## 2024-04-12 ENCOUNTER — OFFICE VISIT (OUTPATIENT)
Dept: NEUROSURGERY | Facility: CLINIC | Age: 34
End: 2024-04-12

## 2024-04-12 ENCOUNTER — TELEPHONE (OUTPATIENT)
Dept: ADMINISTRATIVE | Facility: OTHER | Age: 34
End: 2024-04-12

## 2024-04-12 VITALS
TEMPERATURE: 97.8 F | SYSTOLIC BLOOD PRESSURE: 180 MMHG | OXYGEN SATURATION: 99 % | BODY MASS INDEX: 25.83 KG/M2 | WEIGHT: 174.4 LBS | DIASTOLIC BLOOD PRESSURE: 110 MMHG | HEART RATE: 90 BPM | HEIGHT: 69 IN | RESPIRATION RATE: 20 BRPM

## 2024-04-12 DIAGNOSIS — I63.81 CEREBROVASCULAR ACCIDENT (CVA) DUE TO OCCLUSION OF SMALL ARTERY (HCC): ICD-10-CM

## 2024-04-12 DIAGNOSIS — R53.1 LEFT-SIDED WEAKNESS: ICD-10-CM

## 2024-04-12 DIAGNOSIS — I71.23 ANEURYSM OF DESCENDING THORACIC AORTA WITHOUT RUPTURE (HCC): ICD-10-CM

## 2024-04-12 DIAGNOSIS — I67.1 ANEURYSM OF ANTERIOR COMMUNICATING ARTERY: Primary | ICD-10-CM

## 2024-04-12 NOTE — PROGRESS NOTES
Neurosurgery Office Note  DEANNA Ashraf 33 y.o. male MRN: [id number]      Assessment/Plan     Aneurysm of anterior communicating artery  Seen for evalaution of a 2mm ACoA aneurysm  Incidentally noted on evaluation of hypertensive crisis and CVA 3/2024 manifesting as left sided weakness/numbness. MRI showed an acute infarct in the right posterior caudate extending into the posterior limb of the internal capsule  This has mostly resolved with the exception of a left foot drop. He is working with PT. Complains of fatigue and intermittent headaches.  On aspirin 81 mg  Reports family history of aneurysm in his mother who had a rupture with subsequent treatment.  She has an additional aneurysm that is not being treated due to her medical comorbidities.  Working on smoking cessation.  Hypertensive today as he did not take his medications yet this morning, but at home pressures are usually 130s-140s systolic over 70s diastolic  History of substance abuse on methadone since 2015   Found to have a descending thoracic aortic aneurysm and PCKD.   Exam: CN II-XII grossly intact. BUE/RLE 5/5. LLE 5/5 except DF 3/5. LT intact, brisk left-sided reflexes with left Jim's    Imaging:  CTA head/neck 3/17/24: Mild smooth stenosis of the right cavernous internal carotid artery. There appears to be occlusion of the left posterior cerebral artery proximally with some distal reconstitution. At least one of the anterior M2 branches on the left demonstrates moderate atherosclerotic narrowing. 2 mm aneurysm of the anterior communicating artery at the junction of the left A1 and A2 segments    Plan:  Reviewed imaging with patient, wife, and Dr. Aly.  Small 2 mm ACoA aneurysm seen.  Discussed natural history of aneurysms and risk of rupture.  Discussed genetic component to aneurysms as well as modifiable risk factors such as smoking and HTN.  Given current size and location, risk of rupture is <1%/year per UCAS and <1%/5yrs per  [facility].  Discussed screening of his brother, maternal siblings and cousins with MRA head. If negative, can be re-screened every 5 years.  Continue to follow with neurology, nephrology, evaluation by vascular surgery.   Reviewed red flag s/s.  Follow up in 6 months with MRA head as joint appointment with Dr. Aly.  Call sooner with any questions or concerns.        Diagnoses and all orders for this visit:    Aneurysm of anterior communicating artery  -     Ambulatory Referral to Neurosurgery  -     MRA head wo contrast; Future    Left-sided weakness    Aneurysm of descending thoracic aorta without rupture (HCC)    Right hemispheric stroke          I have spent a total time of 40 minutes on 04/12/24 in caring for this patient including Diagnostic results, Instructions for management, Patient and family education, Importance of tx compliance, Risk factor reductions, Impressions, Counseling / Coordination of care, Documenting in the medical record, Reviewing / ordering tests, medicine, procedures  , Obtaining or reviewing history  , and Communicating with other healthcare professionals .      CHIEF COMPLAINT    Chief Complaint   Patient presents with    Consult    Follow-up     HFU SHARAD aneurysm.       HISTORY    History of Present Illness     33 y.o. year old male     33-year-old gentleman seen for evaluation of a 2 mm ENT communicating artery aneurysm.  This was incidentally noted on evaluation of stroke and hypertensive crisis March 2024 manifesting as left-sided weakness and numbness. States that his numbness has resolved.  The weakness has improved in the arm and is improving in the leg, though he does have a foot drop.  He is working with physical therapy.  Today in the office his blood pressure is extremely high.  States that he did not take his medications yet this morning.  When he does take his meds at home, the pressures usually run in the 130s-140s systolic over 70s diastolic.  He follows with neurology and  is on 81 mg aspirin.  Reports a family history of aneurysm in his mother, she had a ruptured aneurysm that was subsequently treated as well as an additional aneurysm that has not been treated as she is high risk due to her other health issues.  He is working on quitting smoking, smoking 1 cigarette every couple days.  He does complain of a headache that comes and goes, he is very fatigued.  States that his blood pressure medications make him feel very nauseous.  No blurry or double vision, difficulty speaking.  Workup also noted a descending thoracic aortic aneurysm as well as polycystic kidney disease.  History of substance abuse in the past, on chronic methadone.        See Discussion    REVIEW OF SYSTEMS    Review of Systems   Constitutional:  Positive for fatigue (ALWAYS).   HENT: Negative.     Eyes: Negative.    Respiratory: Negative.     Cardiovascular: Negative.    Gastrointestinal:  Positive for vomiting (feels like vomitting all the time specially when taking meds).   Endocrine: Negative.    Genitourinary: Negative.    Musculoskeletal:  Positive for gait problem (lamping had a stoke).   Allergic/Immunologic: Negative.    Neurological:  Negative for dizziness, seizures, speech difficulty, weakness, light-headedness, numbness and headaches.   Psychiatric/Behavioral:  Positive for confusion (confuse and forgetfull sometimes). The patient is not nervous/anxious.        ROS obtained by MA. Reviewed. See HPI.     Meds/Allergies     Current Outpatient Medications   Medication Sig Dispense Refill    amLODIPine (NORVASC) 10 mg tablet Take 1 tablet (10 mg total) by mouth daily 30 tablet 0    aspirin 81 mg chewable tablet Chew 1 tablet (81 mg total) daily 30 tablet 0    atorvastatin (LIPITOR) 40 mg tablet Take 1 tablet (40 mg total) by mouth every evening 30 tablet 0    carvedilol (COREG) 12.5 mg tablet Take 1 tablet (12.5 mg total) by mouth 2 (two) times a day with meals 30 tablet 0    lisinopril (ZESTRIL) 10 mg  "tablet Take 1 tablet (10 mg total) by mouth every 12 (twelve) hours 60 tablet 0    nicotine (NICODERM CQ) 21 mg/24 hr TD 24 hr patch Place 1 patch on the skin over 24 hours daily 28 patch 0     No current facility-administered medications for this visit.       Allergies   Allergen Reactions    Haloperidol Hives and Other (See Comments)     Other reaction(s): Extrapyramidal Symptoms       PAST HISTORY    Past Medical History:   Diagnosis Date    Hypertension 3/21/2024       History reviewed. No pertinent surgical history.    Social History     Tobacco Use    Smoking status: Former     Current packs/day: 0.00     Average packs/day: 0.3 packs/day for 20.0 years (5.0 ttl pk-yrs)     Types: Cigarettes     Quit date: 2024     Years since quittin.0    Smokeless tobacco: Never   Vaping Use    Vaping status: Never Used   Substance Use Topics    Alcohol use: Not Currently    Drug use: Not Currently       History reviewed. No pertinent family history.      Above history personally reviewed.       EXAM    Vitals:Blood pressure (!) 180/110, pulse 90, temperature 97.8 °F (36.6 °C), temperature source Temporal, resp. rate 20, height 5' 9\" (1.753 m), weight 79.1 kg (174 lb 6.4 oz), SpO2 99%.,Body mass index is 25.75 kg/m².     Physical Exam  Vitals reviewed.   Constitutional:       General: He is awake.      Appearance: Normal appearance.   HENT:      Head: Normocephalic and atraumatic.   Eyes:      Extraocular Movements: EOM normal.      Conjunctiva/sclera: Conjunctivae normal.      Pupils: Pupils are equal, round, and reactive to light.   Cardiovascular:      Rate and Rhythm: Normal rate.   Pulmonary:      Effort: Pulmonary effort is normal.   Skin:     General: Skin is warm and dry.   Neurological:      Mental Status: He is alert and oriented to person, place, and time.      Coordination: Finger-Nose-Finger Test and Heel to Shin Test normal.      Deep Tendon Reflexes:      Reflex Scores:       Bicep reflexes are 2+ on " the right side and 3+ on the left side.       Brachioradialis reflexes are 2+ on the right side and 3+ on the left side.       Patellar reflexes are 2+ on the right side and 3+ on the left side.  Psychiatric:         Attention and Perception: Attention and perception normal.         Mood and Affect: Mood and affect normal.         Speech: Speech normal.         Behavior: Behavior normal. Behavior is cooperative.         Thought Content: Thought content normal.         Cognition and Memory: Cognition and memory normal.         Judgment: Judgment normal.         Neurologic Exam     Mental Status   Oriented to person, place, and time.   Oriented to month.   Follows 2 step commands.   Attention: normal. Concentration: normal.   Speech: speech is normal   Level of consciousness: alert  Knowledge: good. Able to perform simple calculations.   Normal comprehension.     Cranial Nerves     CN III, IV, VI   Pupils are equal, round, and reactive to light.  Extraocular motions are normal.   Right pupil: Shape: regular. Reactivity: brisk. Consensual response: intact.   Left pupil: Shape: regular. Reactivity: brisk. Consensual response: intact.   CN III: no CN III palsy  CN VI: no CN VI palsy  Nystagmus: none   Ophthalmoparesis: none  Upgaze: normal  Downgaze: normal  Conjugate gaze: present    CN V   Facial sensation intact.     CN VII   Facial expression full, symmetric.     CN VIII   Hearing: intact    CN XI   Right trapezius strength: normal  Left trapezius strength: normal    CN XII   CN XII normal.     Motor Exam   Muscle bulk: normal  Overall muscle tone: normal  Right arm pronator drift: absent  Left arm pronator drift: absent  BUE - 5/5  RLE - 5/5  LLE - 5/5 except DF 3/5     Sensory Exam   Light touch normal.     Gait, Coordination, and Reflexes     Coordination   Finger to nose coordination: normal  Heel to shin coordination: normal    Tremor   Resting tremor: absent  Intention tremor: absent  Action tremor:  absent    Reflexes   Right brachioradialis: 2+  Left brachioradialis: 3+  Right biceps: 2+  Left biceps: 3+  Right patellar: 2+  Left patellar: 3+  Right Aguilar: absent  Left Aguilar: present  Right ankle clonus: absent  Left ankle clonus: absent  Antalgic gait         MEDICAL DECISION MAKING    Imaging Studies:     [place]    Result Date: 3/21/2024  Narrative:   Left Ventricle: Left ventricular cavity size is normal. Wall thickness is increased. The left ventricular ejection fraction is 60%. Systolic function is normal. Wall motion is normal.   Right Ventricle: Right ventricular cavity size is normal. Systolic function is normal.   Left Atrium: The atrium is dilated. There is no thrombus.   Atrial Septum: No patent foramen ovale detected, confirmed at rest (with abdominal compression) using spectral Doppler and using agitated saline contrast.   Left Atrial Appendage: There is reduced function. There is no thrombus.   Aorta: There is a mild non-protruding atheroma in the descending aorta. No cardiac source of embolism identified (no PFO using color flow Doppler and agitated saline contrast injection).     MRI brain w wo contrast    Result Date: 3/20/2024  Narrative: MRI BRAIN WITH AND WITHOUT CONTRAST INDICATION: stroke/vasculitis. COMPARISON: CT stroke alert brain and CTA stroke alert head and neck 3/17/2024.. TECHNIQUE: Multiplanar, multisequence imaging of the brain was performed before and after gadolinium administration using vessel wall imaging protocol. Axial 3-D time-of-flight imaging with 3-D reconstructions performed without contrast. IV Contrast:  8 mL of Gadobutrol injection (SINGLE-DOSE) IMAGE QUALITY:   Diagnostic. FINDINGS: BRAIN PARENCHYMA: Acute infarct in right posterior caudate extending into right posterior limb of internal capsule. Chronic infarct in posterior putamen extending into right external capsule with hemosiderin deposition, likely sequelae of hemorrhagic infarct. Chronic lacunar infarcts  in bilateral roberto. Multiple chronic microhemorrhages in bilateral roberto and to a lesser extent in right caudate, left putamen, and bilateral cerebellum. There is no discrete mass, mass effect or midline shift. No acute intracranial hemorrhage. Small simple pineal cyst. Postcontrast imaging of the brain demonstrates no abnormal enhancement. No vessel wall thickening or vessel wall enhancement. Small right cerebellar developmental venous anomalies. VENTRICLES:  Normal for the patient's age. SELLA AND PITUITARY GLAND:  Normal. ORBITS:  Normal. PARANASAL SINUSES:  Normal. VASCULATURE: Please see below MRA for further evaluation. CALVARIUM AND SKULL BASE:  Trace bilateral mastoid effusions. EXTRACRANIAL SOFT TISSUES:  Normal. MRA ANATOMY INTERNAL CAROTID ARTERIES:  Normal flow related signal of the distal cervical, petrous and cavernous segments of the internal carotid arteries.  Normal ICA terminus. ANTERIOR CIRCULATION: Markedly hypoplastic right A1 segment, normal variant. Normal caliber left A1 segment. 0.2 cm anterior communicating artery aneurysm (4:75).  Normal flow-related signal of the anterior cerebral arteries. MIDDLE CEREBRAL ARTERY CIRCULATION:  The M1 segment and middle cerebral artery branches demonstrate normal flow-related signal. Moderate-to-severe short segment narrowing in bilateral MCA M2 branch vessels. DISTAL VERTEBRAL ARTERIES: Hypoplastic right and dominant left vertebral arteries are patent with a normal vertebrobasilar junction. Hypoplastic right vertebral artery terminates in PICA, normal variant. Left posterior inferior cerebellar artery origins are normal. BASILAR ARTERY:  Normal. POSTERIOR CEREBRAL ARTERIES:  Both posterior cerebral arteries arises from the basilar tip. Probable chronic occlusion of proximal left PCA with faint flow-related signal more distally possibly due to collateral vessels. Right posterior cerebral artery demonstrate normal flow-related signal.     Impression: MRI  brain - Acute infarct in right posterior caudate extending into right posterior limb of internal capsule. - No vessel wall thickening or vessel wall enhancement to suggest active vasculitis. - Chronic infarct in posterior putamen extending into right external capsule with hemosiderin deposition, likely sequelae of hemorrhagic infarct. - Chronic lacunar infarcts in bilateral roberto. - Multiple chronic microhemorrhages in bilateral roberto and to a lesser extent in right caudate, left putamen, and bilateral cerebellum. Findings suggestive of hypertensive arteriopathy. MRA head - Probable chronic occlusion of proximal left PCA with faint flow-related signal more distally possibly due to collateral vessels. Moderate-to-severe short segment narrowing in bilateral MCA M2 branch vessels. Findings raise possibility of vasculitis over atherosclerotic disease given patient's age. Consider cerebral angiography for further evaluation. - Unchanged 0.2 cm anterior communicating artery aneurysm. The study was marked in EPIC for immediate notification. Workstation performed: [id number]     Echo complete w/ contrast if indicated    Result Date: 3/18/2024  Narrative:   Left Ventricle: Left ventricular cavity size is normal. Wall thickness is moderately increased. There is moderate concentric hypertrophy. The left ventricular ejection fraction is 65%. Systolic function is vigorous. Wall motion is normal. Diastolic function is mildly abnormal, consistent with grade I (abnormal) relaxation.   Right Ventricle: Right ventricular cavity size is normal. Systolic function is hyperdynamic.   Left Atrium: The atrium is mildly dilated.     VAS renal artery complete    Result Date: 3/18/2024  Narrative:  [facility] REPORT CLINICAL: Indications: Patient admitted to hospital with severely elevated blood pressure.  He had previously been treated for hypertension.  He presented with left sided weakness, left facial droop, left sided numbness and  tingling, slow speech, and visual disturbance.  CTA shows an occlusion of the left PCA and a 5.0 cm fusiform aneurysm of the descending aorta.  Physician wants to rule out renal artery stenosis. Operative History: No prior heart or vascular surgery Risk Factors: The patient has history of HTN, prior opiod use with current methadone use, and smoking (current) 0.3 ppd.  Height:  69 inches.  Weight:  182 lbs. Clinical: Brachial BP: Right:  176/79 mm Hg, Left:  IV site.  FINDINGS:  Unilateral       Diameter AP  Diameter Lateral  PSV (cm/s)  EDV (cm/s)    RI  Sup-Sona Ao               4.6               3.2         136          39  0.71  Sup Renal Aorta          2.7               2.8                                Celiac                   1.8               2.2         166          54        Prox. SMA                                              227          47         Right          Impression  PSV (cm/s)  EDV (cm/s)   RAR    RI  Kidney (cm)  Ostial Renal                      142          55  1.04                     Prox Renal     1 - 59%            183          42  1.34  0.77               Mid Renal                         103          43  1.05  0.62               Dist Renal                         86          35  0.64  0.60               Celiac Artery                      36          15        0.58               Kidney                                                               13.09  Upper Pole                         36          15        0.58               Mid Pole                           30          12        0.61               Lower Pole                         27          10        0.64                Left           Impression  PSV (cm/s)  EDV (cm/s)   RAR    RI  Kidney (cm)  Ostial Renal                      105          36  0.77                     Prox Renal     60 - 99%           528         155  3.88  0.71               Mid Renal                         144          56  1.06  0.61               Dist Renal                          65          17  0.48  0.74               Celiac Artery                      33          15        0.55               Kidney                                                               11.78  Upper Pole                         33          15        0.53               Mid Pole                           24          12        0.48               Lower Pole                         32          15        0.55                  CONCLUSION:  Impression  The abdominal aorta is patent and aneurysmal in caliber measuring 4.6 cm at the proximal segment.  RIGHT RENAL: No evidence of significant arterial occlusive disease in the main renal artery. Patent renal vein. Adequate parenchymal flow is noted with a renovascular resistive index of 0.58. Renal/Aorta Ratio:  1.34.  The kidney measures approximately 13.09 cm X 6.1 cm.  LEFT RENAL: A 60 - 99% stenosis was identified in the tortuous main renal artery. Patent renal vein. Adequate parenchymal flow is noted with a renovascular resistive index of 0.55. Renal/Aorta Ratio:  3.88. The kidney measures approximately 11.78 cm X 5.24 cm.  MESENTERIC: Celiac and superior mesenteric arteries are patent.  Tech note:  Multiple renal cysts were identified in the B/L kidney. Technically difficult ICU bedside study secondary to over-lying bowel gas and poor visualization.  Technical findings posted on EPIC.  SIGNATURE: Electronically Signed by: COBY BROWNE DO, RPVI on 2024-03-18 01:31:55 PM    CTA dissection protocol chest/abdomen/pelvis    Result Date: 3/18/2024  Narrative: CTA - CHEST, ABDOMEN AND PELVIS - WITHOUT AND WITH IV CONTRAST INDICATION: rule out dissection in the setting of acute stroke, hypertension, family history. COMPARISON: None. TECHNIQUE: CT examination of the chest, abdomen and pelvis was performed both prior to and after the administration of intravenous contrast. The noncontrast portion of this examination was performed utilizing low radiation  dose technique.  Thin section angiographic arterial phase post contrast technique was used in order to evaluate for aortic dissection. 3D reformatted images and volume rendering were performed on an independent workstation. Multiplanar 2D reformatted images were created from the source data. Radiation dose length product (DLP) for this visit: 2288.82 mGy-cm . This examination, like all CT scans performed in the [facility] beds at the [facility], was performed utilizing techniques to minimize radiation dose exposure, including the use of iterative reconstruction and automated exposure control. IV Contrast: 100 mL of iohexol (OMNIPAQUE) Enteric Contrast: Not administered. FINDINGS: AORTA: Diffuse atherosclerotic disease in the descending thoracic aorta and abdominal aorta, with fusiform aneurysm of the descending aorta at the level of the hiatus measuring 5.0 x 4.8 cm on image 3/100. No aortic dissection or intramural hematoma. No flow limiting atherosclerotic stenosis of aorta or major aortic branch vessel in the chest, abdomen or pelvis. CHEST LUNGS: Lungs are clear. No tracheal or endobronchial lesion. PLEURA: Unremarkable. HEART/PULMONARY ARTERIAL TREE: Unremarkable for patient's age. MEDIASTINUM AND ELICEO: Unremarkable. CHEST WALL AND LOWER NECK: Unremarkable. ABDOMEN LIVER/BILIARY TREE: Unremarkable. GALLBLADDER: Vicarious excretion of contrast. No inflammatory changes. SPLEEN: Unremarkable. PANCREAS: Unremarkable. ADRENAL GLANDS: Unremarkable. KIDNEYS/URETERS: Numerous small cysts in both kidneys, largest measuring 2.7 cm at the right lower pole. Majority of the cysts are simple appearing, however there is a mildly complex cyst with a tiny peripheral calcification of the right upper pole. No hydronephrosis. STOMACH AND BOWEL: Unremarkable. APPENDIX: No findings to suggest appendicitis. ABDOMINOPELVIC CAVITY: No ascites. No pneumoperitoneum. No lymphadenopathy. PELVIS REPRODUCTIVE ORGANS: Unremarkable for patient's  age. URINARY BLADDER: Unremarkable. ABDOMINAL WALL/INGUINAL REGIONS: Unremarkable. BONES: No acute fracture or suspicious osseous lesion.     Impression: 5.0 cm fusiform aneurysm of the descending aorta at the level of the hiatus. No evidence of dissection. Consider vascular surgery referral. Multiple small bilateral renal cysts. The study was marked in EPIC for immediate notification. Workstation performed: [id number]     X-ray chest 1 view portable    Result Date: 3/17/2024  Narrative: XR CHEST PORTABLE INDICATION: Stroke alert. COMPARISON: None FINDINGS: Clear lungs. No pneumothorax or pleural effusion. Normal cardiomediastinal silhouette. Bones are unremarkable for age. Normal upper abdomen.     Impression: No acute cardiopulmonary disease. Workstation performed: [id number]     CTA stroke alert (head/neck)    Result Date: 3/17/2024  Narrative: CTA NECK AND BRAIN WITH CONTRAST INDICATION: Stroke Alert COMPARISON:   None. TECHNIQUE:   Post contrast imaging was performed after administration of iodinated contrast through the neck and brain. Post contrast axial 0.625 mm images timed to opacify the arterial system. 3D rendering was performed on an independent workstation.   MIP reconstructions performed. Coronal reconstructions were performed of the noncontrast portion of the brain. Radiation dose length product (DLP) for this visit:  490.36 mGy-cm .  This examination, like all CT scans performed in the [facility], was performed utilizing techniques to minimize radiation dose exposure, including the use of iterative  reconstruction and automated exposure control. IV Contrast:  100 mL of iohexol (OMNIPAQUE) IMAGE QUALITY:   Diagnostic FINDINGS: CERVICAL VASCULATURE AORTIC ARCH AND GREAT VESSELS:  Normal aortic arch and great vessel origins. Normal visualized subclavian vessels. RIGHT VERTEBRAL ARTERY CERVICAL SEGMENT:  Normal origin. The vessel is diffusely hypoplastic but consistent in caliber to the  skull base. LEFT VERTEBRAL ARTERY CERVICAL SEGMENT:  Normal origin. The vessel is normal in caliber throughout the neck. RIGHT EXTRACRANIAL CAROTID SEGMENT:  Normal caliber common carotid artery.  Normal bifurcation and cervical internal carotid artery.  No stenosis or dissection. LEFT EXTRACRANIAL CAROTID SEGMENT:  Normal caliber common carotid artery.  Normal bifurcation and cervical internal carotid artery.  No stenosis or dissection. NASCET criteria was used to determine the degree of internal carotid artery diameter stenosis. INTRACRANIAL VASCULATURE INTERNAL CAROTID ARTERIES: There is mild smooth stenosis of the right cavernous internal carotid artery with no severe stenosis or large vessel occlusion. The left intracranial internal carotid artery is normal. ANTERIOR CIRCULATION: Hypoplastic right A1 segment. Dominant left A1 segment. There is a small aneurysm of the anterior communicating artery arising at the junction of the left A1 and A2 segments and projecting superiorly, measuring approximately 2 mm in  size. Normal A2 and A3 segments. MIDDLE CEREBRAL ARTERY CIRCULATION: Normal M1 segments. The middle cerebral artery branches are patent. There is focal atherosclerotic disease involving one of the M2 branches on the left, moderate with no M2 occlusion identified. DISTAL VERTEBRAL ARTERIES: Distal left vertebral artery is dominant while the right is hypoplastic especially above the foramen magnum. The right terminates within the posterior inferior cerebellar artery without extension to the vertebrobasilar junction. BASILAR ARTERY:  Basilar artery is normal in caliber.  Normal superior cerebellar arteries. POSTERIOR CEREBRAL ARTERIES: The right posterior cerebral artery is normal. The left appears to occlude just distal to its origin with possible partial reconstitution of distal branches. VENOUS STRUCTURES:  Normal. NON VASCULAR ANATOMY BONY STRUCTURES:  No acute osseous abnormality. SOFT TISSUES OF THE  "NECK:  Normal. THORACIC INLET:  Unremarkable.     Impression: Mild smooth stenosis of the right cavernous internal carotid artery. There appears to be occlusion of the left posterior cerebral artery proximally with some distal reconstitution. At least one of the anterior M2 branches on the left demonstrates moderate atherosclerotic narrowing. 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 Recommend consultation with the [facility], a division of [facility] at [phone]. Findings were directly discussed with Emily Sanchez at 12:30 p.m. This examination was marked \"immediate notification\" in Epic in order to begin the standard process by which the radiology reading room liaison alerts the referring practitioner. Workstation performed: [id number]     CT stroke alert brain    Result Date: 3/17/2024  Narrative: CT BRAIN - STROKE ALERT PROTOCOL INDICATION:   Stroke Alert. COMPARISON:  None. TECHNIQUE:  CT examination of the brain was performed.  In addition to axial images, coronal reformatted images were created and submitted for interpretation. Radiation dose length product (DLP) for this visit:  896.37 mGy-cm .  This examination, like all CT scans performed in the [facility], was performed utilizing techniques to minimize radiation dose exposure, including the use of iterative  reconstruction and automated exposure control. IMAGE QUALITY:  Diagnostic. FINDINGS: PARENCHYMA: There is an old lacunar infarct identified within the right basal ganglia primarily involving the posterior lentiform nucleus with focal encephalomalacia. There is some low density surrounding this area of encephalomalacia which may represent  chronic gliosis. However, the possibility of subacute ivelisse-infarct ischemia is not excluded. Dedicated MRI with diffusion-weighted imaging recommended. No mass, mass effect or midline shift. No " hemorrhage. Unremarkable appearance of the posterior fossa. Normal intracranial vasculature. VENTRICLES AND EXTRA-AXIAL SPACES:  Normal for the patient's age. VISUALIZED ORBITS: Normal visualized orbits. PARANASAL SINUSES: Normal visualized paranasal sinuses. CALVARIUM AND EXTRACRANIAL SOFT TISSUES:   Normal.     Impression: Old lacunar infarction within the right basal ganglia. Mild surrounding low density which may represent chronic gliosis. However, the possibility of subacute ivelisse-infarct ischemia is not excluded. Recommend dedicated MRI of the brain with diffusion-weighted imaging. Findings were directly discussed with Emily Sanchez at approximately 12:30 p.m. Workstation performed: [id number]       I have personally reviewed pertinent reports.   and I have personally reviewed pertinent films in PACS

## 2024-04-12 NOTE — PROGRESS NOTES
Daily Note     Today's date: 2024  Patient name: DEANNA Ashraf  : 1990  MRN: [id number]  Referring provider: Tanesha Roes DO  Dx:   Encounter Diagnosis     ICD-10-CM    1. Cerebrovascular accident (CVA) due to occlusion of left posterior cerebral artery (HCC)  I63.532       2. Gait abnormality  R26.9                      Subjective: ***      Objective: See treatment diary below      Assessment: Tolerated treatment {Tolerated treatment :7160190607}. Patient demonstrated fatigue post treatment, exhibited good technique with therapeutic exercises, and would benefit from continued PT to improve global L sided strength and functional mobility to improve gait pattern and QoL.      Plan: Continue per plan of care.      Precautions: PMH CVA 3/17/24, AAA      Manuals 4/8 4/15      LLE HS, piriformis, hip abd, gastroc                                 Neuro Re-Ed        Monster walks        Step over oksana        [place] walk out        Leticia chop up and down                                Ther Ex        Treadmill for endurance        Leg press        Bridge c ABD        Clamshells        Shoulder ER & ABD c Tband                        HEP instruction/ education 15'       Ther Activity                        Gait Training                        Modalities

## 2024-04-12 NOTE — TELEPHONE ENCOUNTER
----- Message from Geno Avalos sent at 4/12/2024 11:01 AM EDT -----  Regarding: quality metric update  04/12/24 11:01 AM    Hello, our patient above has had Hepatitis C and HIV completed/performed. Please assist in updating the patient chart by pulling the Care Everywhere (CE) document. The date of service is  12/15/2016.     Thank you,  Geno Avalos  [facility] PRIMARY [facility]

## 2024-04-12 NOTE — TELEPHONE ENCOUNTER
Upon review of the In Basket request we were able to locate, review, and update the patient chart as requested for Hepatitis C  and HIV.    Any additional questions or concerns should be emailed to the Practice Liaisons via the appropriate education email address, please do not reply via In Basket.    Thank you  Neyda Harvey MA

## 2024-04-15 ENCOUNTER — APPOINTMENT (OUTPATIENT)
Dept: PHYSICAL THERAPY | Facility: CLINIC | Age: 34
End: 2024-04-15
Payer: COMMERCIAL

## 2024-04-15 ENCOUNTER — TELEPHONE (OUTPATIENT)
Dept: NEUROLOGY | Facility: CLINIC | Age: 34
End: 2024-04-15

## 2024-04-15 DIAGNOSIS — I63.532 CEREBROVASCULAR ACCIDENT (CVA) DUE TO OCCLUSION OF LEFT POSTERIOR CEREBRAL ARTERY (HCC): Primary | ICD-10-CM

## 2024-04-15 DIAGNOSIS — R26.9 GAIT ABNORMALITY: ICD-10-CM

## 2024-04-15 NOTE — TELEPHONE ENCOUNTER
Called patient. Notified him of the provider's response. Patient denies any fevers, diarrhea, or symptoms of gastroenteritis. He expressed understanding and was appreciative.

## 2024-04-15 NOTE — TELEPHONE ENCOUNTER
Adrien Tavera,    No worries. So a small amount of bright red blood like that in the stools is usually not concerning. Most likely he has a small hemorrhoid or the stools were a little hard and something got scraped, but now that he is taking aspirin he had a little bleeding with it. It is possible, however, that the aspirin is causing dyspepsia/stomach upset. I would not normally expect that from carvedilol per se…. How long ago was his actual stroke/hospitalization?    So I would recommend for the moment that he makes sure he’s taking the aspirin with food/milk/antacid. If he is not already doing it, it may be a good idea to start some over-the-counter all according to package instructions while he’s waiting to get to see his primary care doctor…. But if he really can’t eat at all, he might need to be seen at urgent care.    It would also be helpful to make sure he’s not having fevers/diarrhea/symptoms of gastroenteritis.

## 2024-04-15 NOTE — TELEPHONE ENCOUNTER
"Post CVA Discharge Follow Up  Hospitalization: 3/18/24-3/23/24    Called patient. Since discharge, he denies experiencing any new or worsening stroke-like symptoms. Patient reports about 2 weeks ago he started to experience intermittent nausea with vomiting. He believes it is from the carvedilol (nausea/vomiting will start after he takes it). He has an appt to establish with a PCP this Wednesday and plans on discussing this with them. He reports discussing this with neurosurgery at the appointment on 4/12/24 and per his report they recommended to discuss with PCP. The vomit is light brown and a liquid texture. Positive for loss of appetite (he was only able to eat one slice of toast and one Afghan agudelo yesterday). He is drinking gatorade. Today he had a bowel movement and noticed small amount of bright red blood in the stool and small amount of blood on the tissue paper. Denies any history of hemorrhoids. Denies any straining. No fevers. No stomach pain. Since the stroke he has been feeling lightheaded and \"spacey.\" No new weakness, numbness or tingling. No sudden confusion. No vision or speech changes. No severe headaches.    Reviewed medications with him. Reports he is taking as prescribed. He recently quit smoking cigarettes.     Advised patient how this RN will send a message to the neurologist for review. Advised patient to go to the ER if his symptoms would worsen and/or if he develops any new or worsening stroke-like symptoms. He is aware the office will be in contact once we hear from the provider. He was appreciative.     TT sent to Dr. Orlando since Dr. Melendez is out of the office.   "

## 2024-04-17 ENCOUNTER — OFFICE VISIT (OUTPATIENT)
Dept: PHYSICAL THERAPY | Facility: CLINIC | Age: 34
End: 2024-04-17
Payer: COMMERCIAL

## 2024-04-17 ENCOUNTER — TELEPHONE (OUTPATIENT)
Dept: NEPHROLOGY | Facility: CLINIC | Age: 34
End: 2024-04-17

## 2024-04-17 ENCOUNTER — OFFICE VISIT (OUTPATIENT)
Dept: FAMILY MEDICINE CLINIC | Facility: CLINIC | Age: 34
End: 2024-04-17
Payer: COMMERCIAL

## 2024-04-17 ENCOUNTER — TELEPHONE (OUTPATIENT)
Age: 34
End: 2024-04-17

## 2024-04-17 VITALS
HEIGHT: 69 IN | WEIGHT: 173 LBS | OXYGEN SATURATION: 99 % | BODY MASS INDEX: 25.62 KG/M2 | DIASTOLIC BLOOD PRESSURE: 84 MMHG | SYSTOLIC BLOOD PRESSURE: 140 MMHG | HEART RATE: 90 BPM

## 2024-04-17 DIAGNOSIS — F17.200 SMOKING: ICD-10-CM

## 2024-04-17 DIAGNOSIS — F11.90 METHADONE USE: ICD-10-CM

## 2024-04-17 DIAGNOSIS — I10 HYPERTENSION, UNSPECIFIED TYPE: Primary | ICD-10-CM

## 2024-04-17 DIAGNOSIS — I67.1 ANEURYSM OF ANTERIOR COMMUNICATING ARTERY: ICD-10-CM

## 2024-04-17 DIAGNOSIS — R26.9 GAIT ABNORMALITY: ICD-10-CM

## 2024-04-17 DIAGNOSIS — I1A.0 RESISTANT HYPERTENSION: ICD-10-CM

## 2024-04-17 DIAGNOSIS — I63.9 STROKE (HCC): ICD-10-CM

## 2024-04-17 DIAGNOSIS — I63.532 CEREBROVASCULAR ACCIDENT (CVA) DUE TO OCCLUSION OF LEFT POSTERIOR CEREBRAL ARTERY (HCC): Primary | ICD-10-CM

## 2024-04-17 DIAGNOSIS — I63.81 CEREBROVASCULAR ACCIDENT (CVA) DUE TO OCCLUSION OF SMALL ARTERY (HCC): ICD-10-CM

## 2024-04-17 DIAGNOSIS — Q61.3 POLYCYSTIC KIDNEY DISEASE: ICD-10-CM

## 2024-04-17 DIAGNOSIS — I71.23 ANEURYSM OF DESCENDING THORACIC AORTA WITHOUT RUPTURE (HCC): ICD-10-CM

## 2024-04-17 DIAGNOSIS — R53.1 LEFT-SIDED WEAKNESS: ICD-10-CM

## 2024-04-17 DIAGNOSIS — R11.0 NAUSEA: Primary | ICD-10-CM

## 2024-04-17 PROCEDURE — 97112 NEUROMUSCULAR REEDUCATION: CPT

## 2024-04-17 PROCEDURE — 97140 MANUAL THERAPY 1/> REGIONS: CPT

## 2024-04-17 PROCEDURE — 99204 OFFICE O/P NEW MOD 45 MIN: CPT | Performed by: FAMILY MEDICINE

## 2024-04-17 RX ORDER — BLOOD PRESSURE TEST KIT-MEDIUM
KIT MISCELLANEOUS DAILY
Qty: 1 KIT | Refills: 0 | Status: SHIPPED | OUTPATIENT
Start: 2024-04-17 | End: 2024-04-22 | Stop reason: SDUPTHER

## 2024-04-17 RX ORDER — OMEPRAZOLE 20 MG/1
20 CAPSULE, DELAYED RELEASE ORAL
Qty: 30 CAPSULE | Refills: 5 | Status: SHIPPED | OUTPATIENT
Start: 2024-04-17 | End: 2024-04-19 | Stop reason: SDUPTHER

## 2024-04-17 RX ORDER — ATORVASTATIN CALCIUM 40 MG/1
40 TABLET, FILM COATED ORAL EVERY EVENING
Qty: 30 TABLET | Refills: 5 | Status: SHIPPED | OUTPATIENT
Start: 2024-04-17

## 2024-04-17 RX ORDER — LISINOPRIL 10 MG/1
10 TABLET ORAL EVERY 12 HOURS
Qty: 60 TABLET | Refills: 5 | Status: SHIPPED | OUTPATIENT
Start: 2024-04-17

## 2024-04-17 RX ORDER — ONDANSETRON 4 MG/1
4 TABLET, ORALLY DISINTEGRATING ORAL EVERY 6 HOURS PRN
Qty: 20 TABLET | Refills: 1 | Status: SHIPPED | OUTPATIENT
Start: 2024-04-17

## 2024-04-17 RX ORDER — AMLODIPINE BESYLATE 10 MG/1
10 TABLET ORAL DAILY
Qty: 30 TABLET | Refills: 5 | Status: SHIPPED | OUTPATIENT
Start: 2024-04-17

## 2024-04-17 RX ORDER — CARVEDILOL 12.5 MG/1
12.5 TABLET ORAL 2 TIMES DAILY WITH MEALS
Qty: 30 TABLET | Refills: 5 | Status: SHIPPED | OUTPATIENT
Start: 2024-04-17 | End: 2024-04-23

## 2024-04-17 NOTE — ASSESSMENT & PLAN NOTE
I did give prescription for Zofran to use as needed but I am suspicious that this relates to either gastritis or reflux esophagitis and will place on trial of Prilosec 20 mg to be taken before the evening meal and will reassess in 3 weeks

## 2024-04-17 NOTE — PROGRESS NOTES
Daily Note     Today's date: 2024  Patient name: DEANNA Ashraf  : 1990  MRN: [id number]  Referring provider: Tanesha Rose DO  Dx:   Encounter Diagnosis     ICD-10-CM    1. Cerebrovascular accident (CVA) due to occlusion of left posterior cerebral artery (HCC)  I63.532       2. Gait abnormality  R26.9           Start Time: 1435  Stop Time: 1530  Total time in clinic (min): 55 minutes    Subjective: Patient reports he is feeling a little better since eval but gets fatigued very quickly.       Objective: See treatment diary below      Assessment: Tolerated treatment well. Patient demonstrated fatigue post treatment, exhibited good technique with therapeutic exercises, and would benefit from continued PT to improve global strength and balance to improve gait pattern and overall functional mobility.      Plan: Continue per plan of care.      Precautions: PMH CVA 3/17/24, AAA      Manuals       LLE HS, piriformis, hip abd, gastroc, quad  15'                              Neuro Re-Ed        Monster walks  4x10' RTB      Step over oksana  4x10' FWD and lateral      [place] walk out        [place] chop up and down  10x ea 15#      SS c squat  4x10'                      Ther Ex        Treadmill for endurance  5' 0.8 MPH      Leg press        Bridge c ABD        Clamshells        Shoulder ER & ABD c Tband                        HEP instruction/ education 15'       Ther Activity                        Gait Training                        Modalities

## 2024-04-17 NOTE — ASSESSMENT & PLAN NOTE
Systolic pressure borderline today.  Would not adjust meds at this time but in the future would consider doing this.  Note is going to be following up with cardiology

## 2024-04-17 NOTE — TELEPHONE ENCOUNTER
Pt was just seen in the office and would like to know if medication to help his insomnia can be sent over to [place] pharmacy. Please advise.

## 2024-04-17 NOTE — PROGRESS NOTES
Name: DEANNA Ashraf      : 1990      MRN: [id number]  Encounter Provider: Pool Huff MD  Encounter Date: 2024   Encounter department: [facility] PRIMARY CARE    Assessment & Plan     1. Nausea  Assessment & Plan:  I did give prescription for Zofran to use as needed but I am suspicious that this relates to either gastritis or reflux esophagitis and will place on trial of Prilosec 20 mg to be taken before the evening meal and will reassess in 3 weeks    Orders:  -     ondansetron (ZOFRAN-ODT) 4 mg disintegrating tablet; Take 1 tablet (4 mg total) by mouth every 6 (six) hours as needed for nausea or vomiting  -     omeprazole (PriLOSEC) 20 mg delayed release capsule; Take 1 capsule (20 mg total) by mouth daily before breakfast    2. Stroke (HCC)  -     atorvastatin (LIPITOR) 40 mg tablet; Take 1 tablet (40 mg total) by mouth every evening    3. Aneurysm of descending thoracic aorta without rupture (HCC)  Assessment & Plan:  Reviewed the hospital report.  Is going to be following up with cardiology for this    Orders:  -     atorvastatin (LIPITOR) 40 mg tablet; Take 1 tablet (40 mg total) by mouth every evening    4. Resistant hypertension  Assessment & Plan:  Systolic pressure borderline today.  Would not adjust meds at this time but in the future would consider doing this.  Note is going to be following up with cardiology    Orders:  -     amLODIPine (NORVASC) 10 mg tablet; Take 1 tablet (10 mg total) by mouth daily  -     carvedilol (COREG) 12.5 mg tablet; Take 1 tablet (12.5 mg total) by mouth 2 (two) times a day with meals  -     lisinopril (ZESTRIL) 10 mg tablet; Take 1 tablet (10 mg total) by mouth every 12 (twelve) hours    5. Right hemispheric stroke  Assessment & Plan:  Is getting physical therapy at this time but may need brace for the left foot to support it to correct for the foot drop.  Is due for follow-up with neurology and neurosurgery      6.  Smoking  Assessment & Plan:  Currently using patch      7. Methadone use    8. Polycystic kidney disease  Assessment & Plan:  Is going to be following up with nephrology.  Currently kidney function is okay      9. Aneurysm of anterior communicating artery    10. Left-sided weakness  Assessment & Plan:  Is getting physical therapy at this time          Depression Screening and Follow-up Plan: Patient was screened for depression during today's encounter. They screened negative with a PHQ-2 score of 1.    Tobacco Cessation Counseling: Tobacco cessation counseling was not provided. The patient is sincerely urged to quit consumption of tobacco. He is ready to quit tobacco. Nicotine patch was prescribed. Was already on nicotine patch        Subjective      Patient seen for first office check.  Patient has history of hypertension, nicotine abuse, maintenance methadone therapy and polycystic kidney disease.  Was hospitalized due to occlusive CVA and found to have uncontrolled blood pressure and descending aortic aneurysm.  Does have a cerebral aneurysm although this was not considered severe and is currently set up with cardiology, nephrology, cerebral vascular surgery, and neurosurgery as well as neurology.  He is undergoing physical therapy and is having difficulty controlling his left leg and foot drop.  Does have residual speech defect seemingly primarily with pronunciation      Review of Systems   Constitutional:  Positive for activity change and appetite change (Decreased appetite). Negative for fatigue.   HENT:  Negative for congestion and trouble swallowing.    Eyes:  Negative for visual disturbance.   Respiratory:  Negative for chest tightness and shortness of breath.    Cardiovascular:  Negative for chest pain, palpitations and leg swelling.   Gastrointestinal:  Positive for nausea (This is longstanding problem which has been helped with use of Zofran). Negative for abdominal pain, blood in stool and constipation.  "  Endocrine: Negative for polyuria.   Musculoskeletal:  Positive for gait problem.   Neurological:  Positive for speech difficulty. Negative for dizziness, weakness (But does have difficulty controlling his left leg) and light-headedness.   Psychiatric/Behavioral:  Positive for sleep disturbance.        Current Outpatient Medications on File Prior to Visit   Medication Sig    aspirin 81 mg chewable tablet Chew 1 tablet (81 mg total) daily    nicotine (NICODERM CQ) 21 mg/24 hr TD 24 hr patch Place 1 patch on the skin over 24 hours daily    [DISCONTINUED] amLODIPine (NORVASC) 10 mg tablet Take 1 tablet (10 mg total) by mouth daily    [DISCONTINUED] atorvastatin (LIPITOR) 40 mg tablet Take 1 tablet (40 mg total) by mouth every evening    [DISCONTINUED] carvedilol (COREG) 12.5 mg tablet Take 1 tablet (12.5 mg total) by mouth 2 (two) times a day with meals    [DISCONTINUED] lisinopril (ZESTRIL) 10 mg tablet Take 1 tablet (10 mg total) by mouth every 12 (twelve) hours       Objective     Blood Pressure 140/84 (BP Location: Left arm, Patient Position: Sitting, Cuff Size: Standard)   Pulse 90   Height 5' 9\" (1.753 m)   Weight 78.5 kg (173 lb)   Oxygen Saturation 99%   Body Mass Index 25.55 kg/m²     Physical Exam  Vitals and nursing note reviewed.   Constitutional:       Appearance: Normal appearance. He is well-developed.   HENT:      Head: Normocephalic.      Mouth/Throat:      Mouth: Mucous membranes are moist.   Eyes:      Pupils: Pupils are equal, round, and reactive to light.   Neck:      Thyroid: No thyromegaly.      Vascular: No carotid bruit.   Cardiovascular:      Rate and Rhythm: Normal rate and regular rhythm.      Heart sounds: Normal heart sounds. No murmur (Rate is 84) heard.  Pulmonary:      Effort: Pulmonary effort is normal.      Breath sounds: Normal breath sounds.   Abdominal:      General: Abdomen is flat. There is no distension.      Palpations: Abdomen is soft. There is no mass.      Tenderness: " There is abdominal tenderness (Very slight epigastric tenderness). There is no guarding or rebound.   Musculoskeletal:         General: Normal range of motion.      Cervical back: Normal range of motion and neck supple. No tenderness.      Right lower leg: No edema.      Left lower leg: No edema.   Lymphadenopathy:      Cervical: No cervical adenopathy.   Skin:     General: Skin is warm and dry.      Findings: No rash.   Neurological:      Mental Status: He is alert and oriented to person, place, and time.      Motor: Weakness (Does have left foot drop) present.      Coordination: Coordination normal.      Gait: Gait abnormal.      Deep Tendon Reflexes: Reflexes abnormal (Reflexes 3+).   Psychiatric:         Mood and Affect: Mood normal.       Pool Huff MD

## 2024-04-17 NOTE — PATIENT INSTRUCTIONS
Seems to be recovering after the cerebrovascular accident.  Does have increased tone in the left leg and is undergoing physical therapy also to help for the problem with the foot drop.  Has been having long-term problems with nausea and I feel this could relate to either irritation of the stomach or heartburn.  Will place on trial of Prilosec 20 mg to be taken before the evening meal.  I did give Zofran to use for the nausea.  Is due for follow-up with cardiology, vascular surgery, nephrology, and neurology appointments are set

## 2024-04-17 NOTE — ASSESSMENT & PLAN NOTE
Is getting physical therapy at this time but may need brace for the left foot to support it to correct for the foot drop.  Is due for follow-up with neurology and neurosurgery

## 2024-04-18 ENCOUNTER — OFFICE VISIT (OUTPATIENT)
Dept: PHYSICAL THERAPY | Facility: CLINIC | Age: 34
End: 2024-04-18
Payer: COMMERCIAL

## 2024-04-18 ENCOUNTER — TELEPHONE (OUTPATIENT)
Age: 34
End: 2024-04-18

## 2024-04-18 DIAGNOSIS — I63.532 CEREBROVASCULAR ACCIDENT (CVA) DUE TO OCCLUSION OF LEFT POSTERIOR CEREBRAL ARTERY (HCC): Primary | ICD-10-CM

## 2024-04-18 DIAGNOSIS — R26.9 GAIT ABNORMALITY: ICD-10-CM

## 2024-04-18 PROCEDURE — 97140 MANUAL THERAPY 1/> REGIONS: CPT

## 2024-04-18 PROCEDURE — 97112 NEUROMUSCULAR REEDUCATION: CPT

## 2024-04-18 PROCEDURE — 97110 THERAPEUTIC EXERCISES: CPT

## 2024-04-18 NOTE — TELEPHONE ENCOUNTER
Last visit 04/17/2024  Next appt 07/24/2024    Geno, patient's spouse, stated that the only prescription that [facility] did not receive was an order for the omeprazole (prilosec) capsules. Patient would like to be notified when order is sent. Please advise.

## 2024-04-18 NOTE — PROGRESS NOTES
Daily Note     Today's date: 2024  Patient name: DEANNA Ashraf  : 1990  MRN: [id number]  Referring provider: Tanesha Rose DO  Dx:   Encounter Diagnosis     ICD-10-CM    1. Cerebrovascular accident (CVA) due to occlusion of left posterior cerebral artery (HCC)  I63.532       2. Gait abnormality  R26.9           Start Time: 1455  Stop Time: 1550  Total time in clinic (min): 55 minutes    Subjective: Patient reports he is a little stiff and sore but is feeling ok overall.       Objective: See treatment diary below      Assessment: Tolerated treatment well. Patient demonstrated fatigue post treatment, exhibited good technique with therapeutic exercises, and would benefit from continued PT to improve global strength, endurance, and balance to reduce risk for falls and regain optimal function.       Plan: Continue per plan of care.      Precautions: PMH CVA 3/17/24, AAA      Manuals      LLE HS, piriformis, hip abd, gastroc, quad  15' 15'                             Neuro Re-Ed        [place]ster walks  4x10' RTB 4x10' RTB     Step over oksana  4x10' FWD and lateral 2x10' FWD and Lateral c RTB     [place] walk out   5x FWD only 15#     Leticia chop up and down  10x ea 15# 10x ea 15#     SS c squat  4x10' 4x10' RTB                     Ther Ex        Treadmill for endurance  5' 0.8 MPH Nustep 10' L1     Leg press        Bridge c ABD   2x10 GTB     Clamshells   2x10 GTB     Shoulder ER & ABD c Tband                        HEP instruction/ education 15'       Ther Activity                        Gait Training                        Modalities

## 2024-04-19 DIAGNOSIS — R11.0 NAUSEA: ICD-10-CM

## 2024-04-19 RX ORDER — OMEPRAZOLE 20 MG/1
20 CAPSULE, DELAYED RELEASE ORAL
Qty: 30 CAPSULE | Refills: 5 | Status: SHIPPED | OUTPATIENT
Start: 2024-04-19 | End: 2024-04-23 | Stop reason: SDUPTHER

## 2024-04-19 NOTE — TELEPHONE ENCOUNTER
Calling for update on rx. It is not at the pharmacy. Please call patient and advise   [phone]

## 2024-04-19 NOTE — PROGRESS NOTES
Daily Note     Today's date: 2024  Patient name: DEANNA Ashraf  : 1990  MRN: [id number]  Referring provider: Tanesha Rose DO  Dx:   Encounter Diagnosis     ICD-10-CM    1. Cerebrovascular accident (CVA) due to occlusion of left posterior cerebral artery (HCC)  I63.532       2. Gait abnormality  R26.9                      Subjective: ***      Objective: See treatment diary below      Assessment: Tolerated treatment {Tolerated treatment :6453691084}. Patient demonstrated fatigue post treatment, exhibited good technique with therapeutic exercises, and would benefit from continued PT to improve global strength and mobility to improve gait pattern.      Plan: Continue per plan of care.      Precautions: PMH CVA 3/17/24, AAA      Manuals     LLE HS, piriformis, hip abd, gastroc, quad  15' 15'                             Neuro Re-Ed        Monster walks  4x10' RTB 4x10' RTB     Step over oksana  4x10' FWD and lateral 2x10' FWD and Lateral c RTB     [place] walk out   5x FWD only 15#     [place] chop up and down  10x ea 15# 10x ea 15#     SS c squat  4x10' 4x10' RTB                     Ther Ex        Treadmill for endurance  5' 0.8 MPH Nustep 10' L1     Leg press        Bridge c ABD   2x10 GTB     Clamshells   2x10 GTB     Shoulder ER & ABD c Tband                        HEP instruction/ education 15'       Ther Activity                        Gait Training                        Modalities

## 2024-04-22 ENCOUNTER — APPOINTMENT (OUTPATIENT)
Dept: PHYSICAL THERAPY | Facility: CLINIC | Age: 34
End: 2024-04-22
Payer: COMMERCIAL

## 2024-04-22 DIAGNOSIS — I10 HYPERTENSION, UNSPECIFIED TYPE: ICD-10-CM

## 2024-04-22 DIAGNOSIS — R26.9 GAIT ABNORMALITY: ICD-10-CM

## 2024-04-22 DIAGNOSIS — I63.532 CEREBROVASCULAR ACCIDENT (CVA) DUE TO OCCLUSION OF LEFT POSTERIOR CEREBRAL ARTERY (HCC): Primary | ICD-10-CM

## 2024-04-22 NOTE — TELEPHONE ENCOUNTER
Patient  stated that his  regular  insurance  would not  pay for   Blood  Pressure Monitoring  kit. They would  like for it to be sent  over to [facility]  [phone].

## 2024-04-23 ENCOUNTER — OFFICE VISIT (OUTPATIENT)
Dept: CARDIOLOGY CLINIC | Facility: CLINIC | Age: 34
End: 2024-04-23
Payer: COMMERCIAL

## 2024-04-23 ENCOUNTER — TELEPHONE (OUTPATIENT)
Dept: FAMILY MEDICINE CLINIC | Facility: CLINIC | Age: 34
End: 2024-04-23

## 2024-04-23 ENCOUNTER — TELEPHONE (OUTPATIENT)
Dept: CARDIOLOGY CLINIC | Facility: CLINIC | Age: 34
End: 2024-04-23

## 2024-04-23 ENCOUNTER — TELEPHONE (OUTPATIENT)
Age: 34
End: 2024-04-23

## 2024-04-23 VITALS
HEIGHT: 69 IN | WEIGHT: 172 LBS | DIASTOLIC BLOOD PRESSURE: 70 MMHG | BODY MASS INDEX: 25.48 KG/M2 | OXYGEN SATURATION: 99 % | HEART RATE: 91 BPM | SYSTOLIC BLOOD PRESSURE: 152 MMHG

## 2024-04-23 DIAGNOSIS — I10 HYPERTENSION, UNSPECIFIED TYPE: Primary | ICD-10-CM

## 2024-04-23 DIAGNOSIS — I67.1 ANEURYSM OF ANTERIOR COMMUNICATING ARTERY: ICD-10-CM

## 2024-04-23 DIAGNOSIS — F11.90 METHADONE USE: ICD-10-CM

## 2024-04-23 DIAGNOSIS — I71.40 ABDOMINAL AORTIC ANEURYSM (AAA) WITHOUT RUPTURE, UNSPECIFIED PART (HCC): ICD-10-CM

## 2024-04-23 DIAGNOSIS — I70.0 THORACIC AORTA ATHEROSCLEROSIS (HCC): ICD-10-CM

## 2024-04-23 DIAGNOSIS — I15.0 RENOVASCULAR HYPERTENSION: ICD-10-CM

## 2024-04-23 DIAGNOSIS — Q61.3 POLYCYSTIC KIDNEY DISEASE: ICD-10-CM

## 2024-04-23 DIAGNOSIS — I71.23 ANEURYSM OF DESCENDING THORACIC AORTA WITHOUT RUPTURE (HCC): ICD-10-CM

## 2024-04-23 DIAGNOSIS — I70.1 RENAL ARTERY STENOSIS (HCC): ICD-10-CM

## 2024-04-23 DIAGNOSIS — I63.532 CEREBROVASCULAR ACCIDENT (CVA) DUE TO OCCLUSION OF LEFT POSTERIOR CEREBRAL ARTERY (HCC): Primary | ICD-10-CM

## 2024-04-23 DIAGNOSIS — I1A.0 RESISTANT HYPERTENSION: ICD-10-CM

## 2024-04-23 DIAGNOSIS — R11.0 NAUSEA: ICD-10-CM

## 2024-04-23 DIAGNOSIS — F17.200 SMOKING: ICD-10-CM

## 2024-04-23 PROCEDURE — 99244 OFF/OP CNSLTJ NEW/EST MOD 40: CPT | Performed by: STUDENT IN AN ORGANIZED HEALTH CARE EDUCATION/TRAINING PROGRAM

## 2024-04-23 RX ORDER — BLOOD PRESSURE TEST KIT-MEDIUM
KIT MISCELLANEOUS DAILY
Qty: 1 KIT | Refills: 0 | Status: SHIPPED | OUTPATIENT
Start: 2024-04-23 | End: 2024-04-23 | Stop reason: CLARIF

## 2024-04-23 RX ORDER — CARVEDILOL 12.5 MG/1
25 TABLET ORAL 2 TIMES DAILY WITH MEALS
Qty: 30 TABLET | Refills: 5 | Status: SHIPPED | OUTPATIENT
Start: 2024-04-23 | End: 2024-05-07 | Stop reason: ALTCHOICE

## 2024-04-23 RX ORDER — OMEPRAZOLE 20 MG/1
20 CAPSULE, DELAYED RELEASE ORAL
Qty: 30 CAPSULE | Refills: 5 | Status: SHIPPED | OUTPATIENT
Start: 2024-04-23 | End: 2024-10-20

## 2024-04-23 NOTE — TELEPHONE ENCOUNTER
Left a voicemail to patient with the following information: Faxed prescription of blood pressure monitor to [facility] and first they need to know if your insurance will cover. [facility] will call you to let you know if is cover by your insurance. Advised patient to please call the office to let us know he received the message and if have any other questions or concerns.

## 2024-04-23 NOTE — TELEPHONE ENCOUNTER
"Stefania from [facility] called.     She states the received the script for the blood pressure monitor.    She states that the script they got does not have all the patient's information so she isnt able to build him a chart within their system.    She is asking if the script can be re-sent to them with all the patient's demographics.    She is also asking if the script can say its for an \"automatic Blood pressure monitor\"      Fax: [phone]    Phone: [phone]  "

## 2024-04-23 NOTE — TELEPHONE ENCOUNTER
Called [facility] to ask for the address and fax number to fax the prescriptions. Spoke with Delia and told me that pharmacy name will be [email or web address] and no address because first they need prescription to see if patient insurance will cover and then they find a address that is near to patient  to pick it up. I told her that we need a address to add to prescription. She told me to fax the prescription we have at [phone].    Faxed prescription of blood pressure monitor.

## 2024-04-23 NOTE — PROGRESS NOTES
[facility] [place]  [address]  [address]LO[address]  [facility] [address]  Phone#  [phone]  Fax#  [phone]  [facility] Office New Patient Visit             NAME: DEANNA Ashraf  AGE: 33 y.o. SEX: male   : 1990   MRN: [id number]  Assessment and plan:  1. Cerebrovascular accident (CVA) due to occlusion of left posterior cerebral artery (HCC)  -     Ambulatory Referral to Cardiology  -     AMB extended holter monitor; Future; Expected date: 2024    2. 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 Renal artery stenosis (HCC)    4. Renovascular hypertension    5. Thoracic aorta atherosclerosis (HCC)    6. Smoking    7. Polycystic kidney disease    8. Methadone use    9. Aneurysm of anterior communicating artery    10. Abdominal aortic aneurysm (AAA) without rupture, unspecified part (HCC)      Previous cardiac work up  -TTE 3/2024: LVEF 65%, mod cLVH, tTR, tPR, G1DD, mild LA dilatation    -MRI brain with acute infarct, chronic PCA occlusion with collaterals.  Also found to have multiple chronic infarcts in multiple location.  EKGs during admission did not show atrial fibrillation.  TTE and VALENTINA did not show obvious cause of the cardiac embolism.  Will proceed with 1 months extended heart monitor (Zio patch).  And if negative will consider loop recorder.  Completed course of DAPT currently on aspirin 81 mg qd, and atorvastatin 40 mg qd.  -PCKD: follows with nephrology  -aneurism of SHARAD: evaluted by neurosurgery, will have f/u MRI  -3/2024: L Renal aa. 60-99% stenosis, torturous main renal artery. CT 3/2024. Descending aorta fusiform aneurism 5 cm. US 3/2024: AAA 46 mm. Smocking cessation, aspirin 81 mg qd, atorvastatin 40 mg qd, coreg 12.5 mg bid.  Will follow-up with vascular surgery in May 2024.    -stopped smoking 3 weeks, on nicotine patchs.  Encouraged abstinence  -SBP at home in 140s. 152/70 today.  On lisinopril 10 mg  daily,   "amlodipine 10 mg daily, carvedilol 12.5 mg twice daily.  Nephrology manages his blood pressure.  will contact nephrology clinic to update on Mr. Ashraf still elevated blood pressure and if it would be fine to go up on lisinopril especially given slightly elevated creatinine on the recent blood work. Update: spoke to nephrology, will increase coreg to 25 mg bid. He will send us BP logs in 2 weeks.     RTC 3 month    Chief Complaint   Patient presents with    New Patient Visit     HPI:  DEANNA Ashraf is a 33 y.o. male with past medical history of positive kidney disease, previous drug abuse, smoking (quitted 3 weeks ago) who presents to the cardiology clinic for consideration for extended heart monitor after recently was found to have stroke.  In 3/2024  presented with left sided weakness, vision changes found to have hypertensive emergency, with workup showing acute infarct in right posterior caudate extending into right posterior leaf of internal capsule, chronic infarct in posterior putamen extending into the right external capsule, multiple chronic lacunar infarcts, multiple chronic microhemorrhages in bilateral roberto, chronic occlusion of proximal left PCA with possible collateral vessels, some changes in MCA.  He was found to have aneurysm of anterior communicating artery and aneurysm of descending thoracic aorta.  Had TTE and VALENTINA with no intracardiac sources of embolism.  Reports still having residual weakness on left side, works with physical therapy with some improvement.     Soc Hx: quiited 3 weeks ago, smoked x 9 years, 1 ppd, denies alcohol use, history of heroin use (last use 9 years ago), on methadone  Fhx: mother with PCKD, H; maternal mother  at age of 46 from \"massive heart attack\", maternal father had open heart surgery in his 50s (not sure the cause), maternal aunt with multiple CVA and has CAD, maternal unckle with open heart surgery (not sure the cause). Brother passed away at 33 from MVA and had " PCKD    Review of Systems denies chest pain, dyspnea on exertion, palpitations, leg swelling, orthopnea, paroxysmal exertional dyspnea or syncope.      Past history, family history, social history, current medications, vital signs, recent lab and imaging studies and  prior cardiology studies reviewed independently on this visit.    Allergies   Allergen Reactions    Haloperidol Hives and Other (See Comments)     Other reaction(s): Extrapyramidal Symptoms     Current Outpatient Medications:     amLODIPine (NORVASC) 10 mg tablet, Take 1 tablet (10 mg total) by mouth daily, Disp: 30 tablet, Rfl: 5    aspirin 81 mg chewable tablet, Chew 1 tablet (81 mg total) daily, Disp: 30 tablet, Rfl: 0    atorvastatin (LIPITOR) 40 mg tablet, Take 1 tablet (40 mg total) by mouth every evening, Disp: 30 tablet, Rfl: 5    Blood Pressure Monitoring (CVS Blood Pressure Monitor) KIT, Use in the morning, Disp: 1 kit, Rfl: 0    carvedilol (COREG) 12.5 mg tablet, Take 1 tablet (12.5 mg total) by mouth 2 (two) times a day with meals, Disp: 30 tablet, Rfl: 5    lisinopril (ZESTRIL) 10 mg tablet, Take 1 tablet (10 mg total) by mouth every 12 (twelve) hours, Disp: 60 tablet, Rfl: 5    METHADONE HCL PO, Take by mouth, Disp: , Rfl:     nicotine (NICODERM CQ) 21 mg/24 hr TD 24 hr patch, Place 1 patch on the skin over 24 hours daily, Disp: 28 patch, Rfl: 0    omeprazole (PriLOSEC) 20 mg delayed release capsule, Take 1 capsule (20 mg total) by mouth daily before breakfast, Disp: 30 capsule, Rfl: 5    ondansetron (ZOFRAN-ODT) 4 mg disintegrating tablet, Take 1 tablet (4 mg total) by mouth every 6 (six) hours as needed for nausea or vomiting, Disp: 20 tablet, Rfl: 1    Objective:   Vitals:    04/23/24 1024   BP: 152/70   Pulse: 91   SpO2: 99%     Wt Readings from Last 3 Encounters:   04/23/24 78 kg (172 lb)   04/17/24 78.5 kg (173 lb)   04/12/24 79.1 kg (174 lb 6.4 oz)     Pulse Readings from Last 3 Encounters:   04/23/24 91   04/17/24 90   04/12/24 90  "    BP Readings from Last 3 Encounters:   04/23/24 152/70   04/17/24 140/84   04/12/24 (!) 180/110     Physical Exam  General Appearance:    Alert, cooperative, no distress   Head:    atraumatic   Neck:   Supple,  no carotid  bruit or JVD   Lungs:     Clear to auscultation bilaterally, respirations unlabored   Chest wall:    No tenderness or deformity   Heart:    Regular rate and rhythm, S1 and S2 normal, no murmur, rub    or gallop   Abdomen:     Soft, non-tender, no organomegaly   Extremities:   Extremities no cyanosis or edema   Pulses:   2+ and symmetric all extremities   Skin:   Skin color, texture, turgor normal, no rashes or lesions      Pertinent Laboratory/Diagnostic Studies:    Laboratory studies reviewed personally by Angie Ceron DO    BMP:   Lab Results   Component Value Date    SODIUM 137 03/26/2024    K 4.4 03/26/2024     03/26/2024    CO2 31 03/26/2024    BUN 21 03/26/2024    CREATININE 1.37 (H) 03/26/2024    GLUC 95 03/26/2024    CALCIUM 9.9 03/26/2024     CBC:  Lab Results   Component Value Date    WBC 5.83 03/23/2024    HGB 12.8 03/23/2024    HCT 38.3 03/23/2024    MCV 86 03/23/2024     03/23/2024     Lipid Profile:   Lab Results   Component Value Date    HDL 42 03/18/2024     Lab Results   Component Value Date    LDLCALC 110 (H) 03/18/2024     Lab Results   Component Value Date    TRIG 33 03/18/2024      Other labs:  Lab Results   Component Value Date    RYH6NINOWZSO 0.831 03/17/2024     Lab Results   Component Value Date    ALT 16 03/19/2024    AST 17 03/19/2024     Imaging Studies:  Pertinent imaging studies and cardiac studies were independently reviewed on this visit and findings summarized.    Angie Ceron DO    Portions of the record may have been created with voice recognition software.  Occasional wrong word or \"sound alike\" substitutions may have occurred due to the inherent limitations of voice recognition software.  Read the chart carefully and " recognize, using context, where substitutions have occurred. Please reach out to me directly for any clarifications.    No

## 2024-04-23 NOTE — TELEPHONE ENCOUNTER
----- Message from Angie Ceron DO sent at 4/23/2024  3:16 PM EDT -----  Regarding: f/u on blood pressure.  Hell,  Please let Mr. Ashraf know that I spoke to nephrology and we will increase his carvedilol to 25 mg twice daily.  I have sent updated prescription to his pharmacy.  Once he started taking increased dose of carvedilol he should start checking his blood pressure and heart rate in about 10-14 days ( for 3-4 days) and send us those numbers.    Thank you  Angie

## 2024-04-23 NOTE — TELEPHONE ENCOUNTER
I know you've sent this twice electronically, I don't know why the pharmacy isn't receiving it. Maybe 3rd time is the char?

## 2024-04-25 ENCOUNTER — TELEPHONE (OUTPATIENT)
Dept: CARDIOLOGY CLINIC | Facility: CLINIC | Age: 34
End: 2024-04-25

## 2024-04-25 NOTE — PROGRESS NOTES
Daily Note     Today's date: 2024  Patient name: Dylan Ashraf  : 1990  MRN: [id number]  Referring provider: Tanesha Rose DO  Dx:   Encounter Diagnosis     ICD-10-CM    1. Cerebrovascular accident (CVA) due to occlusion of left posterior cerebral artery (HCC)  I63.532       2. Gait abnormality  R26.9                      Subjective: ***      Objective: See treatment diary below      Assessment: Tolerated treatment {Tolerated treatment :4243330127}. Patient demonstrated fatigue post treatment, exhibited good technique with therapeutic exercises, and would benefit from continued PT to improve global strength, coordination, and balance to improve functional mobility.      Plan: Continue per plan of care.      Precautions: PMH CVA 3/17/24, AAA      Manuals     LLE HS, piriformis, hip abd, gastroc, quad  15' 15'                             Neuro Re-Ed        Monster walks  4x10' RTB 4x10' RTB     Step over oksana  4x10' FWD and lateral 2x10' FWD and Lateral c RTB     Leticia walk out   5x FWD only 15#     [place] chop up and down  10x ea 15# 10x ea 15#     SS c squat  4x10' 4x10' RTB                     Ther Ex        Treadmill for endurance  5' 0.8 MPH Nustep 10' L1     Leg press        Bridge c ABD   2x10 GTB     Clamshells   2x10 GTB     Shoulder ER & ABD c Tband                        HEP instruction/ education 15'       Ther Activity                        Gait Training                        Modalities

## 2024-04-25 NOTE — TELEPHONE ENCOUNTER
Spoke with Pt, He is going to call his insurance to see where he can go. He will call PCP for the ZIO.

## 2024-04-25 NOTE — TELEPHONE ENCOUNTER
Caller: DEANNA Ashraf     Doctor: Dr. Cabrera     Reason for call: patient called back wanting to look for new cardiologist in [place], stated he was no longer covered in [facility]. I saw the note in system to call office to go over options. I tried to transfer to office number, [phone], while doing so, call was disconnected. Can someone please call patient back?    Call back#: [phone]

## 2024-04-25 NOTE — TELEPHONE ENCOUNTER
Called and left message for patient to go over a few options for the ordered ZIO    PCP can order the ZIO  Call ZIO directly and they can offered patient assistance in payment options.    [facility] is not par with patients insurance informed patient of this at DOS of 4/23/24 and if office orders the ZIO under the Tax ID of [facility] his insurance will not cover the ZIO    Asked patient to call office back to go over his options.    Routing to clinical as an FYI

## 2024-04-26 ENCOUNTER — APPOINTMENT (OUTPATIENT)
Dept: PHYSICAL THERAPY | Facility: CLINIC | Age: 34
End: 2024-04-26
Payer: COMMERCIAL

## 2024-04-26 DIAGNOSIS — R26.9 GAIT ABNORMALITY: ICD-10-CM

## 2024-04-26 DIAGNOSIS — I63.532 CEREBROVASCULAR ACCIDENT (CVA) DUE TO OCCLUSION OF LEFT POSTERIOR CEREBRAL ARTERY (HCC): Primary | ICD-10-CM

## 2024-04-26 NOTE — PROGRESS NOTES
Daily Note     Today's date: 2024  Patient name: Dylan Ashraf  : 1990  MRN: [id number]  Referring provider: Tanesha Rose DO  Dx:   Encounter Diagnosis     ICD-10-CM    1. Cerebrovascular accident (CVA) due to occlusion of left posterior cerebral artery (HCC)  I63.532       2. Gait abnormality  R26.9           Start Time: 1100  Stop Time: 1155  Total time in clinic (min): 55 minutes    Subjective: Patient reports he is really tired today because he did a lot of walking yesterday.      Objective: See treatment diary below      Assessment: Tolerated treatment well. Patient demonstrated fatigue post treatment, exhibited good technique with therapeutic exercises, and would benefit from continued PT to improve global strength and functional mobility to improve gait pattern.      Plan: Continue per plan of care.      Precautions: PMH CVA 3/17/24, AAA      Manuals     LLE HS, piriformis, hip abd, gastroc, quad  15' 15' 15'                            Neuro Re-Ed        Monster walks  4x10' RTB 4x10' RTB 4x10' RTB    Step over oksana  4x10' FWD and lateral 2x10' FWD and Lateral c RTB 4x10' fwd and lateral AND  4x10' fwd/lateral ea c RWB PNF    Leticia walk out   5x FWD only 15# 5x fwd/retro 15# ea     [place] chop up and down  10x ea 15# 10x ea 15# 10x ea 7.5# down, 5# up L ARM ONLY    SS c squat  4x10' 4x10' RTB 4x10' RTB                    Ther Ex        Treadmill for endurance  5' 0.8 MPH Nustep 10' L1 TM 10' 1.0MPH    Leg press        Bridge c ABD   2x10 GTB 2x10 BTB    Clamshells   2x10 GTB NT    Shoulder ER & ABD c Tband                        HEP instruction/ education 15'       Ther Activity                        Gait Training                        Modalities

## 2024-04-26 NOTE — TELEPHONE ENCOUNTER
PA for Omeprazole    Submitted via    [x]CMM-KEY  [id number]  [][employer]-Case ID #   []Faxed to plan   []Other website   []Phone call Case ID #     Office notes sent, clinical questions answered. Awaiting determination    Turnaround time for your insurance to make a decision on your Prior Authorization can take 7-21 business days.

## 2024-04-29 ENCOUNTER — OFFICE VISIT (OUTPATIENT)
Dept: PHYSICAL THERAPY | Facility: CLINIC | Age: 34
End: 2024-04-29
Payer: COMMERCIAL

## 2024-04-29 DIAGNOSIS — I63.532 CEREBROVASCULAR ACCIDENT (CVA) DUE TO OCCLUSION OF LEFT POSTERIOR CEREBRAL ARTERY (HCC): Primary | ICD-10-CM

## 2024-04-29 DIAGNOSIS — R26.9 GAIT ABNORMALITY: ICD-10-CM

## 2024-04-29 PROCEDURE — 97112 NEUROMUSCULAR REEDUCATION: CPT

## 2024-04-29 PROCEDURE — 97110 THERAPEUTIC EXERCISES: CPT

## 2024-04-29 PROCEDURE — 97140 MANUAL THERAPY 1/> REGIONS: CPT

## 2024-04-29 NOTE — TELEPHONE ENCOUNTER
PA for Omeprazoel not required     Reason (screenshot if applicable)              Message sent to provider pool no

## 2024-05-01 ENCOUNTER — TELEPHONE (OUTPATIENT)
Age: 34
End: 2024-05-01

## 2024-05-01 ENCOUNTER — APPOINTMENT (OUTPATIENT)
Dept: PHYSICAL THERAPY | Facility: CLINIC | Age: 34
End: 2024-05-01
Payer: COMMERCIAL

## 2024-05-01 NOTE — TELEPHONE ENCOUNTER
I called and spoke with patient to reschedule his appointment due to provider will be in another office location. Patient confirmed date time and location with me.

## 2024-05-03 NOTE — PROGRESS NOTES
Daily Note     Today's date: 5/3/2024  Patient name: Dylan Ashraf  : 1990  MRN: [id number]  Referring provider: Tanesha Rose DO  Dx:   Encounter Diagnosis     ICD-10-CM    1. Cerebrovascular accident (CVA) due to occlusion of left posterior cerebral artery (HCC)  I63.532       2. Gait abnormality  R26.9                      Subjective: ***      Objective: See treatment diary below      Assessment: Tolerated treatment {Tolerated treatment :[id number]}. Patient demonstrated fatigue post treatment, exhibited good technique with therapeutic exercises, and would benefit from continued PT to improve global strength and mobility to improve gait pattern and overall QoL.       Plan: Continue per plan of care.      Precautions: PMH CVA 3/17/24, AAA      Manuals  5   LLE HS, piriformis, hip abd, gastroc, quad  15' 15' 15'                            Neuro Re-Ed        Monster walks  4x10' RTB 4x10' RTB 4x10' RTB    Step over oksana  4x10' FWD and lateral 2x10' FWD and Lateral c RTB 4x10' fwd and lateral AND  4x10' fwd/lateral ea c RWB PNF    Leticia walk out   5x FWD only 15# 5x fwd/retro 15# ea     [place] chop up and down  10x ea 15# 10x ea 15# 10x ea 7.5# down, 5# up L ARM ONLY    SS c squat  4x10' 4x10' RTB 4x10' RTB                    Ther Ex        Treadmill for endurance  5' 0.8 MPH Nustep 10' L1 TM 10' 1.0MPH    Leg press        Bridge c ABD   2x10 GTB 2x10 BTB    Clamshells   2x10 GTB NT    Shoulder ER & ABD c Tband                        HEP instruction/ education 15'       Ther Activity                        Gait Training                        Modalities

## 2024-05-06 ENCOUNTER — APPOINTMENT (OUTPATIENT)
Dept: PHYSICAL THERAPY | Facility: CLINIC | Age: 34
End: 2024-05-06
Payer: COMMERCIAL

## 2024-05-06 DIAGNOSIS — R26.9 GAIT ABNORMALITY: ICD-10-CM

## 2024-05-06 DIAGNOSIS — I63.532 CEREBROVASCULAR ACCIDENT (CVA) DUE TO OCCLUSION OF LEFT POSTERIOR CEREBRAL ARTERY (HCC): Primary | ICD-10-CM

## 2024-05-07 ENCOUNTER — CONSULT (OUTPATIENT)
Dept: VASCULAR SURGERY | Facility: CLINIC | Age: 34
End: 2024-05-07
Payer: COMMERCIAL

## 2024-05-07 VITALS
SYSTOLIC BLOOD PRESSURE: 164 MMHG | OXYGEN SATURATION: 98 % | DIASTOLIC BLOOD PRESSURE: 86 MMHG | BODY MASS INDEX: 25.77 KG/M2 | HEART RATE: 84 BPM | HEIGHT: 69 IN | WEIGHT: 174 LBS

## 2024-05-07 DIAGNOSIS — I71.23 ANEURYSM OF DESCENDING THORACIC AORTA WITHOUT RUPTURE (HCC): ICD-10-CM

## 2024-05-07 DIAGNOSIS — R53.1 LEFT-SIDED WEAKNESS: ICD-10-CM

## 2024-05-07 DIAGNOSIS — I63.81 CEREBROVASCULAR ACCIDENT (CVA) DUE TO OCCLUSION OF SMALL ARTERY (HCC): Primary | ICD-10-CM

## 2024-05-07 DIAGNOSIS — I63.532 CEREBROVASCULAR ACCIDENT (CVA) DUE TO OCCLUSION OF LEFT POSTERIOR CEREBRAL ARTERY (HCC): ICD-10-CM

## 2024-05-07 PROCEDURE — 99214 OFFICE O/P EST MOD 30 MIN: CPT | Performed by: SURGERY

## 2024-05-07 NOTE — PROGRESS NOTES
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  He has residual left-sided hemiparesis from recent stroke.  He denies any known family history of aortic aneurysmal disease although he does report his mother had a brain aneurysm.  Given his young age and the extent of aneurysmal disease raises suspicion for possible connective tissue disorder.  I have recommended and referred patient to [facility] center. He has been provided with contact number for [facility] vascular group.  He would benefit for genetic testing.  I couldn't not over stress the importance of following up with [facility]. He understands risk of rupture and death.  I offered to assist/facilitate any transfer of medical records if needed. He will call our office with any questions/concerns or if he needs additional assistance.        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 male.    Patient is new to our office. He is here today review results of a renal artery US and CTA chest/ABD/Pelvis w/wo contrast done 3/18/2024. Patient c/o frequent abd pain, abd pain after eating and low back pain. Patient has a history of recent stroke affecting his left side. Patient is taking ASA 81 mg and Atorvastatin. He is a former smoker.      Dylan HUMPHREYS  Is a 33-year-old gentleman referred to our office for descending thoracic aneurysm extending down to the level of the celiac artery. He has history of recent  "stroke with residual left sided hemiparesis.        The following portions of the patient's history were reviewed and updated as appropriate: allergies, current medications, past family history, past medical history, past social history, past surgical history, and problem list.    Review of Systems   Constitutional: Negative.    HENT: Negative.     Eyes: Negative.    Respiratory: Negative.     Cardiovascular: Negative.    Gastrointestinal:  Positive for abdominal pain and nausea.   Endocrine: Negative.    Genitourinary: Negative.    Musculoskeletal:  Positive for back pain.   Skin: Negative.    Allergic/Immunologic: Negative.    Neurological:  Positive for weakness.   Hematological: Negative.    Psychiatric/Behavioral: Negative.           Objective:      /86 (BP Location: Left arm, Patient Position: Sitting, Cuff Size: Standard)   Pulse 84   Ht 5' 9\" (1.753 m)   Wt 78.9 kg (174 lb)   SpO2 98%   BMI 25.70 kg/m²          Physical Exam  Constitutional:       General: He is not in acute distress.     Appearance: He is well-developed.   HENT:      Head: Normocephalic and atraumatic.   Eyes:      General: No scleral icterus.     Conjunctiva/sclera: Conjunctivae normal.   Neck:      Trachea: No tracheal deviation.   Cardiovascular:      Rate and Rhythm: Normal rate and regular rhythm.      Heart sounds: Normal heart sounds.   Pulmonary:      Effort: Pulmonary effort is normal.      Breath sounds: Normal breath sounds.   Abdominal:      General: There is no distension.      Palpations: Abdomen is soft. There is no mass (no appreciable aortic pulsation/aneurysm).      Tenderness: There is no abdominal tenderness. There is no guarding or rebound.   Musculoskeletal:         General: Normal range of motion.      Cervical back: Normal range of motion and neck supple.   Skin:     General: Skin is warm and dry.   Neurological:      Mental Status: He is alert and oriented to person, place, and time.      Motor: Weakness " present.      Coordination: Coordination abnormal.      Gait: Gait abnormal.   Psychiatric:         Mood and Affect: Mood normal.         Behavior: Behavior normal.         Thought Content: Thought content normal.         Judgment: Judgment normal.             CT imaging:  IMPRESSION:     5.0 cm fusiform aneurysm of the descending aorta at the level of the hiatus. No evidence of dissection. Consider vascular surgery referral.

## 2024-05-07 NOTE — ASSESSMENT & PLAN NOTE
Dylan WILSON is an unfortunate 33-year-old gentleman with recent stroke who is now referred to the office for descending thoracic aneurysm extending down to the level of the celiac artery with maximal diameter measuring  at approximately 5.2.  He has residual left-sided hemiparesis from recent stroke.  He denies any known family history of aortic aneurysmal disease although he does report his mother had a brain aneurysm.  Given his young age and the extent of aneurysmal disease raises suspicion for possible connective tissue disorder.  I have recommended and referred patient to [facility] center. He has been provided with contact number for [facility] vascular group.  He would benefit for genetic testing.  I couldn't not over stress the importance of following up with [facility]. He understands risk of rupture and death.  I offered to assist/facilitate any transfer of medical records if needed. He will call our office with any questions/concerns or if he needs additional assistance.

## 2024-05-08 ENCOUNTER — TELEPHONE (OUTPATIENT)
Age: 34
End: 2024-05-08

## 2024-05-08 NOTE — TELEPHONE ENCOUNTER
Spoke with Geno, DEANNA WILSON's wife  and she is aware that I am working on this. I fax'd results from study to Jessie at [facility] and she will be sending me an address to send the disc.

## 2024-05-08 NOTE — PROGRESS NOTES
Daily Note     Today's date: 2024  Patient name: Dylan Ashraf  : 1990  MRN: [id number]  Referring provider: Tanesha Rose DO  Dx:   Encounter Diagnosis     ICD-10-CM    1. Cerebrovascular accident (CVA) due to occlusion of left posterior cerebral artery (HCC)  I63.532       2. Gait abnormality  R26.9           Start Time: 1100  Stop Time: 1140  Total time in clinic (min): 40 minutes    Subjective: Patient reports he is not feeling very well today and is very fatigued.       Objective: See treatment diary below  BP after 3 minute rest: 168/108      Assessment: Tolerated treatment fair. PT treatment shortened to accommodate for patients symptoms, reports of stroke like symptoms yesterday, and increased resting BP. Patient demonstrated fatigue post treatment, exhibited good technique with therapeutic exercises, and would benefit from continued PT to improve global strength and mobility to improve gait pattern and functional mobility.      Plan: Continue per plan of care.      Precautions: PMH CVA 3/17/24, AAA      Manuals    LLE HS, piriformis, hip abd, gastroc, quad  15' 15' 15' 15'                           Neuro Re-Ed        Monster walks  4x10' RTB 4x10' RTB 4x10' RTB NT   Step over oksana  4x10' FWD and lateral 2x10' FWD and Lateral c RTB 4x10' fwd and lateral AND  4x10' fwd/lateral ea c RWB PNF NT   [place] walk out   5x FWD only 15# 5x fwd/retro 15# ea  NT   [place] chop up and down  10x ea 15# 10x ea 15# 10x ea 7.5# down, 5# up L ARM ONLY 10x ea down 10#   SS c squat  4x10' 4x10' RTB 4x10' RTB NT                   Ther Ex        Treadmill for endurance  5' 0.8 MPH Nustep 10' L1 TM 10' 1.0MPH TM 10' 1.0 MPH   Leg press        Bridge c ABD   2x10 GTB 2x10 BTB 2x10 BTB   Clamshells   2x10 GTB NT 2x10 BTB   Shoulder ER & ABD c Tband     SLR 2x10 flexion                   HEP instruction/ education 15'       Ther Activity                        Gait Training                         Modalities

## 2024-05-08 NOTE — TELEPHONE ENCOUNTER
Pt's wife called regarding their referral to [facility]. Pt has scheduled appointments and is stating that they need imaging reports faxed (to [phone] attn: Jessie) and/or a disc with the imaging mailed to the [facility] office.    Pt's wife did not have the mailing address at the time of call and will work on getting that address. Advised pt we would gather this information and pass it along to [facility]. Please reach out to the pt or his wife with any further questions.

## 2024-05-09 ENCOUNTER — OFFICE VISIT (OUTPATIENT)
Dept: PHYSICAL THERAPY | Facility: CLINIC | Age: 34
End: 2024-05-09
Payer: COMMERCIAL

## 2024-05-09 DIAGNOSIS — I63.532 CEREBROVASCULAR ACCIDENT (CVA) DUE TO OCCLUSION OF LEFT POSTERIOR CEREBRAL ARTERY (HCC): Primary | ICD-10-CM

## 2024-05-09 DIAGNOSIS — R26.9 GAIT ABNORMALITY: ICD-10-CM

## 2024-05-09 PROCEDURE — 97140 MANUAL THERAPY 1/> REGIONS: CPT

## 2024-05-09 PROCEDURE — 97110 THERAPEUTIC EXERCISES: CPT

## 2024-05-09 PROCEDURE — 97112 NEUROMUSCULAR REEDUCATION: CPT

## 2024-05-10 ENCOUNTER — TELEPHONE (OUTPATIENT)
Dept: VASCULAR SURGERY | Facility: CLINIC | Age: 34
End: 2024-05-10

## 2024-05-10 NOTE — TELEPHONE ENCOUNTER
Had correspondence with Jessie Walker from the office of Dr. Johnny Martínez MD, Division of Vascular Surgery and Endovascular Therapy, at [facility]. I fax'd office note from last office visit, and recent study results. Disc from [facility] was couriered using special  services for same day delivery on Friday 5/10/2024.

## 2024-05-10 NOTE — TELEPHONE ENCOUNTER
Spoke with patient's wife. I told her that the disc will be delivered to the Dr. Johnny Martínez at [facility] today via special .

## 2024-05-13 ENCOUNTER — EVALUATION (OUTPATIENT)
Dept: PHYSICAL THERAPY | Facility: CLINIC | Age: 34
End: 2024-05-13
Payer: COMMERCIAL

## 2024-05-13 DIAGNOSIS — I63.532 CEREBROVASCULAR ACCIDENT (CVA) DUE TO OCCLUSION OF LEFT POSTERIOR CEREBRAL ARTERY (HCC): Primary | ICD-10-CM

## 2024-05-13 DIAGNOSIS — R26.9 GAIT ABNORMALITY: ICD-10-CM

## 2024-05-13 PROCEDURE — 97110 THERAPEUTIC EXERCISES: CPT

## 2024-05-13 PROCEDURE — 97112 NEUROMUSCULAR REEDUCATION: CPT

## 2024-05-13 PROCEDURE — 97140 MANUAL THERAPY 1/> REGIONS: CPT

## 2024-05-13 NOTE — PROGRESS NOTES
PT Re-Evaluation     Today's date: 2024  Patient name: Dylan Ashraf  : 1990  MRN: [id number]  Referring provider: Tanesha Rose DO  Dx:   Encounter Diagnosis     ICD-10-CM    1. Cerebrovascular accident (CVA) due to occlusion of left posterior cerebral artery (HCC)  I63.532       2. Gait abnormality  R26.9             Start Time: 1600  Stop Time: 1655  Total time in clinic (min): 55 minutes    Assessment  Assessment details: Patient has been seen by OP PT for 5 visits and is making good progress towards his goals. He demonstrated improved LLE strength compared to R but continues to have some weakness. He is also presenting with improved tolerance to activity and improve gait pattern, however, is still hemiplegic on LLE with most difficulty with L dorsiflexion. He demonstrated decreased fall risk with improvement in 5xSTS time from 12 to 8 seconds, indicating improved power and BLE strength. He would benefit from continued skilled PT to  further improve functional mobility, strength, and balance to improve overall QoL.  Impairments: abnormal gait, abnormal muscle firing, activity intolerance, impaired physical strength, lacks appropriate home exercise program, pain with function and poor body mechanics    Goals  STG to be met within 4 weeks:  1. Pt will improve TUG time by 3 seconds to reduce fall risk.  2. Pt will improve 5xSTS time by 3 seconds to reduce fall risk. - met  3. Pt will improve LLE strength by 1/2 muscles grades to improve gait pattern. - met  4. Pt will be I in HEP.    LTG to be met within 8 weeks:  1. Pt will demonstrate improved gait pattern with decreased drop foot on L to reduce risk for falls. - in progress  2. Pt will improve LLE and LUE strength to WFL to reduce gait abnormalities.- in progress  3. Pt will demonstrate TUG and 5xSTS WFL to reduce risk for falls. - met  4. Pt will be I in HEP for DC. - in progress    Plan  Plan details: Continue with POC.  Patient would benefit  from: skilled physical therapy  Planned therapy interventions: abdominal trunk stabilization, flexibility, gait training, graded activity, graded exercise, home exercise program, manual therapy, neuromuscular re-education, patient education, postural training, self care, strengthening, stretching, therapeutic activities and therapeutic exercise  Frequency: 2x week  Duration in weeks: 6  Treatment plan discussed with: patient        Subjective Evaluation    History of Present Illness  Mechanism of injury: Patient reports that he feels like he has made about 60%-70% improvement since beginning PT and states that some days he can walk perfectly fine and others he has trouble. He does continue to have deficits with his LLE and feels weakness on that side. He has been very active at home and trying to walk as much as he can and feels that has been helping him.   Patient Goals  Patient goals for therapy: improved balance, increased motion, increased strength, independence with ADLs/IADLs and return to work    Pain  No pain reported    Social Support  Steps to enter house: no  Stairs in house: no   Lives in: one-story house  Lives with: parents and spouse    Hand dominance: right    Treatments  Current treatment: physical therapy        Objective     Neurological Testing     Sensation   Cervical/Thoracic   Left   Diminished: light touch    Right   Intact: light touch    Lumbar   Left   Diminished: light touch    Right   Intact: light touch    Strength/Myotome Testing     Left Shoulder     Planes of Motion   Flexion: 4+   Extension: 4+   Abduction: 4+   Adduction: 4+   External rotation at 0°: 4+   Internal rotation at 0°: 4+     Right Shoulder   Normal muscle strength    Left Elbow   Flexion: 4+  Extension: 4+    Right Elbow   Normal strength    Lumbar     Right   Normal strength    Left Hip   Planes of Motion   Flexion: 4+  Extension: 4  Abduction: 4  Adduction: 4  External rotation: 4  Internal rotation: 4+    Left Knee    Flexion: 4  Extension: 4    Left Ankle/Foot   Dorsiflexion: 4  Plantar flexion: 4-    Ambulation     Observational Gait   Gait: asymmetric   Increased left stance time, right stance time and left swing time. Decreased walking speed, stride length, right swing time, left step length and right step length.   Left foot contact pattern: foot flat  Left arm swing: decreased  Right arm swing: decreased    Quality of Movement During Gait     Hip    Hip (Left): Positive hike.     Ankle    Ankle (Left): Positive foot drop.   Neuro Exam:     Functional outcomes   5x sit to stand: 8 (seconds)             Precautions: PMH CVA 3/17/24, AAA  POC Expiration: 6/24/24    Manuals 4/17 4/18 4/29 5/9 5/13   LLE HS, piriformis, hip abd, gastroc, quad 15' 15' 15' 15' 15'                           Neuro Re-Ed        [place] walks 4x10' RTB 4x10' RTB 4x10' RTB NT NT   Step over [place] 4x10' FWD and lateral 2x10' FWD and Lateral c RTB 4x10' fwd and lateral AND  4x10' fwd/lateral ea c RWB PNF NT NT   [place] walk out  5x FWD only 15# 5x fwd/retro 15# ea  NT 5x 4 way 15#   [place] chop up and down 10x ea 15# 10x ea 15# 10x ea 7.5# down, 5# up L ARM ONLY 10x ea down 10# 10x ea L only 10# down, 7.5# up    SS c squat 4x10' 4x10' RTB 4x10' RTB NT NT        Push/pull cart 5x 25#           Ther Ex        Treadmill for endurance 5' 0.8 MPH Nustep 10' L1 TM 10' 1.0MPH TM 10' 1.0 MPH Nustep 10' L8   Leg press        Bridge c ABD  2x10 GTB 2x10 BTB 2x10 BTB 2x10 BTB   Clamshells  2x10 GTB NT 2x10 BTB 2x10 BTB   Shoulder ER & ABD c Tband    SLR 2x10 flexion NT                   HEP instruction/ education        Ther Activity                        Gait Training                        Modalities

## 2024-05-15 NOTE — PROGRESS NOTES
Daily Note     Today's date: 5/15/2024  Patient name: Dylan Ashraf  : 1990  MRN: [id number]  Referring provider: Tanesha Rose DO  Dx:   Encounter Diagnosis     ICD-10-CM    1. Cerebrovascular accident (CVA) due to occlusion of left posterior cerebral artery (HCC)  I63.532       2. Gait abnormality  R26.9                      Subjective: ***      Objective: See treatment diary below      Assessment: Tolerated treatment {Tolerated treatment :1364686461}. Patient demonstrated fatigue post treatment, exhibited good technique with therapeutic exercises, and would benefit from continued PT to improve global strength and mobility to improve functional mobility and QoL.      Plan: Continue per plan of care.      Precautions: PMH CVA 3/17/24, AAA  POC Expiration: 24    Manuals    LLE HS, piriformis, hip abd, gastroc, quad 15' 15' 15' 15'                            Neuro Re-Ed        [place] walks 4x10' RTB 4x10' RTB NT NT    Step over [place] 2x10' FWD and Lateral c RTB 4x10' fwd and lateral AND  4x10' fwd/lateral ea c RWB PNF NT NT    [place] walk out 5x FWD only 15# 5x fwd/retro 15# ea  NT 5x 4 way 15#    [place] chop up and down 10x ea 15# 10x ea 7.5# down, 5# up L ARM ONLY 10x ea down 10# 10x ea L only 10# down, 7.5# up     SS c squat 4x10' RTB 4x10' RTB NT NT        Push/pull cart 5x 25#            Ther Ex        Treadmill for endurance Nustep 10' L1 TM 10' 1.0MPH TM 10' 1.0 MPH Nustep 10' L8    Leg press        Bridge c ABD 2x10 GTB 2x10 BTB 2x10 BTB 2x10 BTB    Clamshells 2x10 GTB NT 2x10 BTB 2x10 BTB    Shoulder ER & ABD c Tband   SLR 2x10 flexion NT                    HEP instruction/ education        Ther Activity                        Gait Training                        Modalities

## 2024-05-16 ENCOUNTER — APPOINTMENT (OUTPATIENT)
Dept: PHYSICAL THERAPY | Facility: CLINIC | Age: 34
End: 2024-05-16
Payer: COMMERCIAL

## 2024-05-16 DIAGNOSIS — I63.532 CEREBROVASCULAR ACCIDENT (CVA) DUE TO OCCLUSION OF LEFT POSTERIOR CEREBRAL ARTERY (HCC): Primary | ICD-10-CM

## 2024-05-16 DIAGNOSIS — R26.9 GAIT ABNORMALITY: ICD-10-CM

## 2024-05-20 ENCOUNTER — OFFICE VISIT (OUTPATIENT)
Dept: PHYSICAL THERAPY | Facility: CLINIC | Age: 34
End: 2024-05-20
Payer: COMMERCIAL

## 2024-05-20 DIAGNOSIS — R26.9 GAIT ABNORMALITY: ICD-10-CM

## 2024-05-20 DIAGNOSIS — I63.532 CEREBROVASCULAR ACCIDENT (CVA) DUE TO OCCLUSION OF LEFT POSTERIOR CEREBRAL ARTERY (HCC): Primary | ICD-10-CM

## 2024-05-20 PROCEDURE — 97140 MANUAL THERAPY 1/> REGIONS: CPT

## 2024-05-20 PROCEDURE — 97110 THERAPEUTIC EXERCISES: CPT

## 2024-05-20 PROCEDURE — 97112 NEUROMUSCULAR REEDUCATION: CPT

## 2024-05-20 NOTE — PROGRESS NOTES
Daily Note     Today's date: 2024  Patient name: Dylan Ashraf  : 1990  MRN: [id number]  Referring provider: Tanesha Rose DO  Dx:   Encounter Diagnosis     ICD-10-CM    1. Cerebrovascular accident (CVA) due to occlusion of left posterior cerebral artery (HCC)  I63.532       2. Gait abnormality  R26.9           Start Time: 1350  Stop Time: 1445  Total time in clinic (min): 55 minutes    Subjective: Patient reports he is feeling dizzy today and has been for the last couple of days. He isn't sure if his wife may have switched some of his medications.       Objective: See treatment diary below  Resting vitals: /107 R arm in sitting, SpO2 99%, HR 93  Post exercise vitals: /101 R arm in sitting, SpO2 98%, HR 93      Assessment: Tolerated treatment well. Patient demonstrated fatigue post treatment, exhibited good technique with therapeutic exercises, and would benefit from continued PT to improve global strength and balance to improve gait pattern and functional mobility.      Plan: Continue per plan of care.      Precautions: PMH CVA 3/17/24, AAA  POC Expiration: 24    Manuals    LLE HS, piriformis, hip abd, gastroc, quad 15' 15' 15' 15' 15'                           Neuro Re-Ed        [place] walks 4x10' RTB 4x10' RTB NT NT 4x10' GTB   Step over oksana 2x10' FWD and Lateral c RTB 4x10' fwd and lateral AND  4x10' fwd/lateral ea c RWB PNF NT NT 4x10' fwd/lateral ea c RWB PNF   [place] walk out 5x FWD only 15# 5x fwd/retro 15# ea  NT 5x 4 way 15# NT   [place] chop up and down 10x ea 15# 10x ea 7.5# down, 5# up L ARM ONLY 10x ea down 10# 10x ea L only 10# down, 7.5# up  10x ea L only 9#    SS c squat 4x10' RTB 4x10' RTB NT NT 4x10 GTB       Push/pull cart 5x 25#            Ther Ex        Treadmill for endurance Nustep 10' L1 TM 10' 1.0MPH TM 10' 1.0 MPH Nustep 10' L8 Nustep 10' L8   Leg press        Bridge c ABD 2x10 GTB 2x10 BTB 2x10 BTB 2x10 BTB 2x10 BTB   Clamshells  2x10 GTB NT 2x10 BTB 2x10 BTB 2x10 BTB   Shoulder ER & ABD c Tband   SLR 2x10 flexion NT                    HEP instruction/ education        Ther Activity                        Gait Training                        Modalities

## 2024-05-22 ENCOUNTER — OFFICE VISIT (OUTPATIENT)
Dept: PHYSICAL THERAPY | Facility: CLINIC | Age: 34
End: 2024-05-22
Payer: COMMERCIAL

## 2024-05-22 DIAGNOSIS — I63.532 CEREBROVASCULAR ACCIDENT (CVA) DUE TO OCCLUSION OF LEFT POSTERIOR CEREBRAL ARTERY (HCC): Primary | ICD-10-CM

## 2024-05-22 DIAGNOSIS — R26.9 GAIT ABNORMALITY: ICD-10-CM

## 2024-05-22 PROCEDURE — 97112 NEUROMUSCULAR REEDUCATION: CPT

## 2024-05-22 PROCEDURE — 97140 MANUAL THERAPY 1/> REGIONS: CPT

## 2024-05-22 PROCEDURE — 97110 THERAPEUTIC EXERCISES: CPT

## 2024-05-24 NOTE — PROGRESS NOTES
Daily Note     Today's date: 2024  Patient name: Dylan Ashraf  : 1990  MRN: [id number]  Referring provider: Tanesha Rose DO  Dx:   Encounter Diagnosis     ICD-10-CM    1. Cerebrovascular accident (CVA) due to occlusion of left posterior cerebral artery (HCC)  I63.532       2. Gait abnormality  R26.9                      Subjective: ***      Objective: See treatment diary below      Assessment: Tolerated treatment {Tolerated treatment :[id number]}. Patient demonstrated fatigue post treatment, exhibited good technique with therapeutic exercises, and would benefit from continued PT to improve global strengthand mobility to improve gait pattern and balance.       Plan: Continue per plan of care.      Precautions: PMH CVA 3/17/24, AAA  POC Expiration: 24    Manuals    LLE HS, piriformis, hip abd, gastroc, quad 15' 15' 15' 15'                            Neuro Re-Ed        [place]ster walks NT NT 4x10' GTB 4x10' GTB    Step over oksana NT NT 4x10' fwd/lateral ea c RWB PNF 4x10' fwd/lat ea c RWB PNF    [place] walk out NT 5x 4 way 15# NT     [place] chop up and down 10x ea down 10# 10x ea L only 10# down, 7.5# up  10x ea L only 9#  NT    SS c squat NT NT 4x10 GTB 4x10 GTB      Push/pull cart 5x 25#              Ther Ex        Treadmill for endurance TM 10' 1.0 MPH Nustep 10' L8 Nustep 10' L8 Nustep 10' L5    Leg press    125# DL  85# SL  2x10 ea     Bridge c ABD 2x10 BTB 2x10 BTB 2x10 BTB 2x10 BTB    Clamshells 2x10 BTB 2x10 BTB 2x10 BTB 2x10 BTB    Shoulder ER & ABD c Tband SLR 2x10 flexion NT                      HEP instruction/ education        Ther Activity                        Gait Training                        Modalities

## 2024-05-28 ENCOUNTER — APPOINTMENT (OUTPATIENT)
Dept: PHYSICAL THERAPY | Facility: CLINIC | Age: 34
End: 2024-05-28
Payer: COMMERCIAL

## 2024-05-28 DIAGNOSIS — R26.9 GAIT ABNORMALITY: ICD-10-CM

## 2024-05-28 DIAGNOSIS — I63.532 CEREBROVASCULAR ACCIDENT (CVA) DUE TO OCCLUSION OF LEFT POSTERIOR CEREBRAL ARTERY (HCC): Primary | ICD-10-CM

## 2024-05-29 NOTE — PROGRESS NOTES
Daily Note     Today's date: 2024  Patient name: Dylan Ashraf  : 1990  MRN: [id number]  Referring provider: Tanesha Rose DO  Dx:   Encounter Diagnosis     ICD-10-CM    1. Cerebrovascular accident (CVA) due to occlusion of left posterior cerebral artery (HCC)  I63.532       2. Gait abnormality  R26.9           Start Time: 1500  Stop Time: 1550  Total time in clinic (min): 50 minutes    Subjective: Patient reports he has been having trouble with his vision for the last few days and is just feeling very drained.       Objective: See treatment diary below  Pre tx vitals: 177/89 BP  Post tx vitals: 163/93 BP    Assessment: Tolerated treatment well. Patient demonstrated fatigue post treatment, exhibited good technique with therapeutic exercises, and would benefit from continued PT to improve global strength and balance to reduce risk for falls and improve QoL.      Plan: Continue per plan of care.      Precautions: PMH CVA 3/17/24, AAA  POC Expiration: 24    Manuals    LLE HS, piriformis, hip abd, gastroc, quad 15' 15' 15' 15' 10'                           Neuro Re-Ed        [place]ster walks NT NT 4x10' GTB 4x10' GTB    Step over oksana NT NT 4x10' fwd/lateral ea c RWB PNF 4x10' fwd/lat ea c RWB PNF    [place] walk out NT 5x 4 way 15# NT  5x 4 way 15#   [place] chop up and down 10x ea down 10# 10x ea L only 10# down, 7.5# up  10x ea L only 9#  NT 10x ea L only 10#   SS c squat NT NT 4x10 GTB 4x10 GTB      Push/pull cart 5x 25#   Push/pull cart 5x 30#           Ther Ex        Treadmill for endurance TM 10' 1.0 MPH Nustep 10' L8 Nustep 10' L8 Nustep 10' L5 Nustep 10' L5   Leg press    125# DL  85# SL  2x10 ea  125# DL and SL 2x10 ea    Bridge c ABD 2x10 BTB 2x10 BTB 2x10 BTB 2x10 BTB    Clamshells 2x10 BTB 2x10 BTB 2x10 BTB 2x10 BTB    Shoulder ER & ABD c Tband SLR 2x10 flexion NT                      HEP instruction/ education        Ther Activity                        Gait  Training                        Modalities

## 2024-05-30 ENCOUNTER — OFFICE VISIT (OUTPATIENT)
Dept: PHYSICAL THERAPY | Facility: CLINIC | Age: 34
End: 2024-05-30
Payer: COMMERCIAL

## 2024-05-30 DIAGNOSIS — R26.9 GAIT ABNORMALITY: ICD-10-CM

## 2024-05-30 DIAGNOSIS — I63.532 CEREBROVASCULAR ACCIDENT (CVA) DUE TO OCCLUSION OF LEFT POSTERIOR CEREBRAL ARTERY (HCC): Primary | ICD-10-CM

## 2024-05-30 PROCEDURE — 97140 MANUAL THERAPY 1/> REGIONS: CPT

## 2024-05-30 PROCEDURE — 97110 THERAPEUTIC EXERCISES: CPT

## 2024-05-30 PROCEDURE — 97112 NEUROMUSCULAR REEDUCATION: CPT

## 2024-06-04 ENCOUNTER — APPOINTMENT (OUTPATIENT)
Dept: PHYSICAL THERAPY | Facility: CLINIC | Age: 34
End: 2024-06-04
Payer: COMMERCIAL

## 2024-06-26 ENCOUNTER — EVALUATION (OUTPATIENT)
Dept: PHYSICAL THERAPY | Facility: CLINIC | Age: 34
End: 2024-06-26
Payer: COMMERCIAL

## 2024-06-26 DIAGNOSIS — R26.9 GAIT ABNORMALITY: ICD-10-CM

## 2024-06-26 DIAGNOSIS — I63.532 CEREBROVASCULAR ACCIDENT (CVA) DUE TO OCCLUSION OF LEFT POSTERIOR CEREBRAL ARTERY (HCC): Primary | ICD-10-CM

## 2024-06-26 PROCEDURE — 97110 THERAPEUTIC EXERCISES: CPT

## 2024-06-26 PROCEDURE — 97535 SELF CARE MNGMENT TRAINING: CPT

## 2024-06-26 PROCEDURE — 97140 MANUAL THERAPY 1/> REGIONS: CPT

## 2024-06-26 NOTE — PROGRESS NOTES
PT Re-Evaluation     Today's date: 2024  Patient name: Dylan Ashraf  : 1990  MRN: [id number]  Referring provider: Tanesha Rsoe DO  Dx:   Encounter Diagnosis     ICD-10-CM    1. Cerebrovascular accident (CVA) due to occlusion of left posterior cerebral artery (HCC)  I63.532       2. Gait abnormality  R26.9             Start Time: 1055  Stop Time: 1155  Total time in clinic (min): 60 minutes    Assessment  Impairments: abnormal gait, abnormal muscle firing, activity intolerance, impaired physical strength, lacks appropriate home exercise program, pain with function and poor body mechanics    Assessment details: Patient has been seen by OP PT for 9 visits and is making fair progress towards goals. He is demonstrating good strength in his L UE and L LE compared to R, however, he is experiencing more difficulty functional mobility due to increased clonus and decreased flexibility. He is also demonstrating slower gait pattern and increased difficulty with L ankle dorsiflexion. He was educated in a new HEP to improve these deficits but would benefit from continued skilled PT to improve LLE strength, mobility, and flexibility to improve gait pattern.     Goals  STG to be met within 4 weeks:  1. Pt will improve TUG time by 3 seconds to reduce fall risk. - DC  2. Pt will improve 5xSTS time by 3 seconds to reduce fall risk. - met  3. Pt will improve LLE strength by 1/2 muscles grades to improve gait pattern. - met  4. Pt will be I in HEP. - met    LTG to be met within 8 weeks:  1. Pt will demonstrate improved gait pattern with decreased drop foot on L to reduce risk for falls. - in progress  2. Pt will improve LLE and LUE strength to WFL to reduce gait abnormalities.- in progress  3. Pt will demonstrate TUG and 5xSTS WFL to reduce risk for falls. - met  4. Pt will be I in HEP for DC. - in progress    Plan  Patient would benefit from: skilled physical therapy    Planned therapy interventions: abdominal trunk  stabilization, flexibility, gait training, graded activity, graded exercise, home exercise program, manual therapy, neuromuscular re-education, patient education, postural training, self care, strengthening, stretching, therapeutic activities and therapeutic exercise    Frequency: 2x week  Duration in weeks: 4  Treatment plan discussed with: patient  Plan details: Continue with POC.        Subjective Evaluation    History of Present Illness  Mechanism of injury: Patient reports since his last PT visit, he has been feeling so horrible and has been doing nothing but sleeping. He states he is trying to wean off of methadone and feels that is making him feel very sick. He also states that because he has not been to therapy, he has decreased flexibility in his LLE and has been having more clonus.   Patient Goals  Patient goals for therapy: improved balance, increased motion, increased strength, independence with ADLs/IADLs and return to work    Pain  No pain reported    Social Support  Steps to enter house: no  Stairs in house: no   Lives in: one-story house  Lives with: parents and spouse    Hand dominance: right    Treatments  Current treatment: physical therapy        Objective     Neurological Testing     Sensation   Cervical/Thoracic   Left   Diminished: light touch    Right   Intact: light touch    Lumbar   Left   Diminished: light touch    Right   Intact: light touch    Strength/Myotome Testing     Left Shoulder     Planes of Motion   Flexion: 4+   Extension: 4+   Abduction: 4+   Adduction: 4+   External rotation at 0°: 4+   Internal rotation at 0°: 4+     Right Shoulder   Normal muscle strength    Left Elbow   Normal strength  Flexion: 5  Extension: 5    Right Elbow   Normal strength    Lumbar     Right   Normal strength    Left Hip   Planes of Motion   Flexion: 4+  Extension: 4  Abduction: 4  Adduction: 4  External rotation: 4  Internal rotation: 4+    Left Knee   Flexion: 4+  Extension: 4+    Left Ankle/Foot    Dorsiflexion: 4  Plantar flexion: 4-    Ambulation     Observational Gait   Gait: asymmetric   Increased left stance time, right stance time and left swing time. Decreased walking speed, stride length, right swing time, left step length and right step length.   Left foot contact pattern: foot flat  Left arm swing: decreased  Right arm swing: decreased    Quality of Movement During Gait     Hip    Hip (Left): Positive hike.     Ankle    Ankle (Left): Positive foot drop.   Neuro Exam:     Functional outcomes   5x sit to stand: 8 (seconds)             Precautions: PMH CVA 3/17/24, AAA  POC Expiration: 7/24/24    Manuals 4/18 4/29 5/9 5/13 6/26   LLE HS, piriformis, hip abd, gastroc, quad 15' 15' 15' 15' 10'                           Neuro Re-Ed        [place] walks 4x10' RTB 4x10' RTB NT NT    Step over oksana 2x10' FWD and Lateral c RTB 4x10' fwd and lateral AND  4x10' fwd/lateral ea c RWB PNF NT NT    [place] walk out 5x FWD only 15# 5x fwd/retro 15# ea  NT 5x 4 way 15#    [place] chop up and down 10x ea 15# 10x ea 7.5# down, 5# up L ARM ONLY 10x ea down 10# 10x ea L only 10# down, 7.5# up     SS c squat 4x10' RTB 4x10' RTB NT NT        Push/pull cart 5x 25#            Ther Ex        Treadmill for endurance Nustep 10' L1 TM 10' 1.0MPH TM 10' 1.0 MPH Nustep 10' L8 Nustep 10' L4   Leg press     115# SL 2x10 ea   125# DL 2x10   Bridge c ABD 2x10 GTB 2x10 BTB 2x10 BTB 2x10 BTB NT   Clamshells 2x10 GTB NT 2x10 BTB 2x10 BTB NT   Shoulder ER & ABD c Tband   SLR 2x10 flexion NT                    HEP instruction/ education     20'   Ther Activity                        Gait Training                        Modalities

## 2024-07-01 ENCOUNTER — APPOINTMENT (OUTPATIENT)
Dept: PHYSICAL THERAPY | Facility: CLINIC | Age: 34
End: 2024-07-01
Payer: COMMERCIAL

## 2024-07-03 ENCOUNTER — OFFICE VISIT (OUTPATIENT)
Dept: PHYSICAL THERAPY | Facility: CLINIC | Age: 34
End: 2024-07-03
Payer: COMMERCIAL

## 2024-07-03 DIAGNOSIS — I63.532 CEREBROVASCULAR ACCIDENT (CVA) DUE TO OCCLUSION OF LEFT POSTERIOR CEREBRAL ARTERY (HCC): Primary | ICD-10-CM

## 2024-07-03 DIAGNOSIS — R26.9 GAIT ABNORMALITY: ICD-10-CM

## 2024-07-03 PROCEDURE — 97140 MANUAL THERAPY 1/> REGIONS: CPT

## 2024-07-03 PROCEDURE — 97110 THERAPEUTIC EXERCISES: CPT

## 2024-07-03 PROCEDURE — 97112 NEUROMUSCULAR REEDUCATION: CPT

## 2024-07-03 NOTE — PROGRESS NOTES
Daily Note     Today's date: 7/3/2024  Patient name: Dylan Ashraf  : 1990  MRN: [id number]  Referring provider: Tanesha Rose DO  Dx:   Encounter Diagnosis     ICD-10-CM    1. Cerebrovascular accident (CVA) due to occlusion of left posterior cerebral artery (HCC)  I63.532       2. Gait abnormality  R26.9           Start Time: 1050  Stop Time: 1145  Total time in clinic (min): 55 minutes    Subjective: Patient reports he was so sore after the previous session that he was unable to walk.       Objective: See treatment diary below      Assessment: Tolerated treatment well. Patient demonstrated fatigue post treatment, exhibited good technique with therapeutic exercises, and would benefit from continued PT to improve global strength and mobility to improve gait pattern and reduce impairments associated with CVA.      Plan: Continue per plan of care.      Precautions: PMH CVA 3/17/24, AAA  POC Expiration: 24    Manuals 4/29 5/9 5/13 6/26 7/3   LLE HS, piriformis, hip abd, gastroc, quad 15' 15' 15' 10' 15'                           Neuro Re-Ed        [place] walks 4x10' RTB NT NT  4x10' RTB   Step over o[place]ana 4x10' fwd and lateral AND  4x10' fwd/lateral ea c RWB PNF NT NT     [place] walk out 5x fwd/retro 15# ea  NT 5x 4 way 15#  5x fwd/bwd 15#   [place] chop up and down 10x ea 7.5# down, 5# up L ARM ONLY 10x ea down 10# 10x ea L only 10# down, 7.5# up   10x ea L only 10#    SS c squat 4x10' RTB NT NT  4x10' RTB      Push/pull cart 5x 25#             Ther Ex        Treadmill for endurance TM 10' 1.0MPH TM 10' 1.0 MPH Nustep 10' L8 Nustep 10' L4 Nustep 10' L4   Leg press    115# SL 2x10 ea   125# DL 2x10 135# DL and SL 2x10 ea    Bridge c ABD 2x10 BTB 2x10 BTB 2x10 BTB NT 2x10 BTB   Clamshells NT 2x10 BTB 2x10 BTB NT 2x10 BTB   Shoulder ER & ABD c Tband  SLR 2x10 flexion NT                     HEP instruction/ education    20'    Ther Activity                        Gait Training                         Modalities

## 2024-07-08 ENCOUNTER — APPOINTMENT (OUTPATIENT)
Dept: PHYSICAL THERAPY | Facility: CLINIC | Age: 34
End: 2024-07-08
Payer: COMMERCIAL

## 2024-07-10 ENCOUNTER — APPOINTMENT (OUTPATIENT)
Dept: PHYSICAL THERAPY | Facility: CLINIC | Age: 34
End: 2024-07-10
Payer: COMMERCIAL

## 2024-07-15 ENCOUNTER — APPOINTMENT (OUTPATIENT)
Dept: PHYSICAL THERAPY | Facility: CLINIC | Age: 34
End: 2024-07-15
Payer: COMMERCIAL

## 2024-07-17 ENCOUNTER — APPOINTMENT (OUTPATIENT)
Dept: PHYSICAL THERAPY | Facility: CLINIC | Age: 34
End: 2024-07-17
Payer: COMMERCIAL

## 2024-07-19 NOTE — PROGRESS NOTES
Daily Note     Today's date: 2024  Patient name: Dylan Ashraf  : 1990  MRN: [id number]  Referring provider: Tanesha Rose DO  Dx:   Encounter Diagnosis     ICD-10-CM    1. Cerebrovascular accident (CVA) due to occlusion of left posterior cerebral artery (HCC)  I63.532       2. Gait abnormality  R26.9                      Subjective: ***      Objective: See treatment diary below      Assessment: Tolerated treatment {Tolerated treatment :0933225474}. Patient demonstrated fatigue post treatment, exhibited good technique with therapeutic exercises, and would benefit from continued PT to improve global strength ad mobility to improve gait pattern and reduce risk for falls.       Plan: Continue per plan of care.      Precautions: PMH CVA 3/17/24, AAA  POC Expiration: 24    Manuals 7/22 5/9 5/13 6/26 7/3   LLE HS, piriformis, hip abd, gastroc, quad  15' 15' 10' 15'                           Neuro Re-Ed        [place] walks  NT NT  4x10' RTB   Step over oksana  NT NT     [place] walk out  NT 5x 4 way 15#  5x fwd/bwd 15#   [place] chop up and down  10x ea down 10# 10x ea L only 10# down, 7.5# up   10x ea L only 10#    SS c squat  NT NT  4x10' RTB      Push/pull cart 5x 25#             Ther Ex        Treadmill for endurance  TM 10' 1.0 MPH Nustep 10' L8 Nustep 10' L4 Nustep 10' L4   Leg press    115# SL 2x10 ea   125# DL 2x10 135# DL and SL 2x10 ea    Bridge c ABD  2x10 BTB 2x10 BTB NT 2x10 BTB   Clamshells  2x10 BTB 2x10 BTB NT 2x10 BTB   Shoulder ER & ABD c Tband  SLR 2x10 flexion NT                     HEP instruction/ education    20'    Ther Activity                        Gait Training                        Modalities

## 2024-07-22 ENCOUNTER — APPOINTMENT (OUTPATIENT)
Dept: PHYSICAL THERAPY | Facility: CLINIC | Age: 34
End: 2024-07-22
Payer: COMMERCIAL

## 2024-07-22 DIAGNOSIS — I63.532 CEREBROVASCULAR ACCIDENT (CVA) DUE TO OCCLUSION OF LEFT POSTERIOR CEREBRAL ARTERY (HCC): Primary | ICD-10-CM

## 2024-07-22 DIAGNOSIS — R26.9 GAIT ABNORMALITY: ICD-10-CM

## 2024-08-16 LAB
DME PARACHUTE DELIVERY DATE ACTUAL: NORMAL
DME PARACHUTE DELIVERY DATE REQUESTED: NORMAL
DME PARACHUTE ITEM DESCRIPTION: NORMAL
DME PARACHUTE ORDER STATUS: NORMAL
DME PARACHUTE SUPPLIER NAME: NORMAL
DME PARACHUTE SUPPLIER PHONE: NORMAL

## 2024-08-19 DIAGNOSIS — I10 HYPERTENSION, UNSPECIFIED TYPE: Primary | ICD-10-CM

## 2024-08-19 DIAGNOSIS — Q61.3 POLYCYSTIC KIDNEY DISEASE: ICD-10-CM

## 2024-09-26 ENCOUNTER — TELEPHONE (OUTPATIENT)
Dept: NEUROSURGERY | Facility: CLINIC | Age: 34
End: 2024-09-26

## 2024-09-26 NOTE — TELEPHONE ENCOUNTER
Called patient and he hung up on me before I could tell him who I was and why I was calling so I called again and he sent me to  so I left a message informing him of the MRA that needs to be scheduled and completed before his upcoming appointment on 10/4 @2:15 with Claire. Left the central scheduling number along with the office number if he has any questions or concerns.

## 2024-10-03 ENCOUNTER — TELEPHONE (OUTPATIENT)
Age: 34
End: 2024-10-03

## 2024-10-03 NOTE — TELEPHONE ENCOUNTER
10/3/24 PLEASE F/U WITH PT IN A FEW DAYS TO DISCUSS RESCHEDULING SNPX F/U THAT WAS CANCELLED [employer]IA [employer] (FINANCIAL), PT NEEDS MRA SCHEDULED PRIOR. TY.

## 2024-11-13 ENCOUNTER — TELEPHONE (OUTPATIENT)
Dept: NEPHROLOGY | Facility: CLINIC | Age: 34
End: 2024-11-13

## 2024-11-13 NOTE — TELEPHONE ENCOUNTER
Left voicemail for the patient reminding to please complete labwork prior to 11/20 appointment with Dr. Rodriguez. Advised patient to call back with any questions or  Concerns.

## 2024-12-12 DIAGNOSIS — I1A.0 RESISTANT HYPERTENSION: ICD-10-CM

## 2024-12-17 RX ORDER — LISINOPRIL 10 MG/1
10 TABLET ORAL EVERY 12 HOURS
Qty: 60 TABLET | Refills: 1 | Status: SHIPPED | OUTPATIENT
Start: 2024-12-17

## 2024-12-17 RX ORDER — AMLODIPINE BESYLATE 10 MG/1
10 TABLET ORAL DAILY
Qty: 30 TABLET | Refills: 1 | Status: SHIPPED | OUTPATIENT
Start: 2024-12-17

## 2025-03-03 RX ORDER — CARVEDILOL 25 MG/1
25 TABLET ORAL DAILY
COMMUNITY
Start: 2024-04-23

## 2025-03-06 ENCOUNTER — OFFICE VISIT (OUTPATIENT)
Dept: FAMILY MEDICINE CLINIC | Facility: CLINIC | Age: 35
End: 2025-03-06
Payer: COMMERCIAL

## 2025-03-06 VITALS
HEIGHT: 69 IN | SYSTOLIC BLOOD PRESSURE: 160 MMHG | WEIGHT: 180 LBS | BODY MASS INDEX: 26.66 KG/M2 | DIASTOLIC BLOOD PRESSURE: 100 MMHG

## 2025-03-06 DIAGNOSIS — F11.90 METHADONE USE: ICD-10-CM

## 2025-03-06 DIAGNOSIS — Z00.00 WELLNESS EXAMINATION: Primary | ICD-10-CM

## 2025-03-06 DIAGNOSIS — I63.9 STROKE (HCC): ICD-10-CM

## 2025-03-06 DIAGNOSIS — R53.1 LEFT-SIDED WEAKNESS: ICD-10-CM

## 2025-03-06 DIAGNOSIS — I67.1 ANEURYSM OF ANTERIOR COMMUNICATING ARTERY: ICD-10-CM

## 2025-03-06 DIAGNOSIS — I1A.0 RESISTANT HYPERTENSION: ICD-10-CM

## 2025-03-06 DIAGNOSIS — I71.23 ANEURYSM OF DESCENDING THORACIC AORTA WITHOUT RUPTURE (HCC): ICD-10-CM

## 2025-03-06 DIAGNOSIS — K62.5 RECTAL BLEEDING: ICD-10-CM

## 2025-03-06 DIAGNOSIS — Q61.3 POLYCYSTIC KIDNEY DISEASE: ICD-10-CM

## 2025-03-06 DIAGNOSIS — I63.532 CEREBROVASCULAR ACCIDENT (CVA) DUE TO OCCLUSION OF LEFT POSTERIOR CEREBRAL ARTERY (HCC): ICD-10-CM

## 2025-03-06 PROCEDURE — 99395 PREV VISIT EST AGE 18-39: CPT | Performed by: FAMILY MEDICINE

## 2025-03-06 RX ORDER — AMLODIPINE BESYLATE 10 MG/1
10 TABLET ORAL DAILY
Qty: 30 TABLET | Refills: 5 | Status: SHIPPED | OUTPATIENT
Start: 2025-03-06

## 2025-03-06 RX ORDER — LISINOPRIL 20 MG/1
20 TABLET ORAL EVERY 12 HOURS
Qty: 30 TABLET | Refills: 5 | Status: SHIPPED | OUTPATIENT
Start: 2025-03-06

## 2025-03-06 RX ORDER — LISINOPRIL 20 MG/1
20 TABLET ORAL EVERY 12 HOURS
Qty: 30 TABLET | Refills: 5 | Status: SHIPPED | OUTPATIENT
Start: 2025-03-06 | End: 2025-03-06 | Stop reason: SDUPTHER

## 2025-03-06 RX ORDER — ATORVASTATIN CALCIUM 40 MG/1
40 TABLET, FILM COATED ORAL EVERY EVENING
Qty: 30 TABLET | Refills: 5 | Status: SHIPPED | OUTPATIENT
Start: 2025-03-06

## 2025-03-06 NOTE — ASSESSMENT & PLAN NOTE
Does follow-up with vascular surgery  Orders:    atorvastatin (LIPITOR) 40 mg tablet; Take 1 tablet (40 mg total) by mouth every evening

## 2025-03-06 NOTE — ASSESSMENT & PLAN NOTE
Pressure elevated today although claims just took the blood pressure medicine before coming in.  Will continue with current regimen and reassess at next visit  Orders:    amLODIPine (NORVASC) 10 mg tablet; Take 1 tablet (10 mg total) by mouth daily    lisinopril (ZESTRIL) 20 mg tablet; Take 1 tablet (20 mg total) by mouth every 12 (twelve) hours

## 2025-03-06 NOTE — PATIENT INSTRUCTIONS
Exam today remarkable for the elevated blood pressure.  Claims to be taking the medication on a regular basis and should continue.  Try to watch the salt in your diet and we will reassess at next visit.  Discussed the rectal bleeding.  Will check CBC.  Should start supplement of Metamucil orange flavored 1 tablespoon in 4 ounces of cold water daily and MiraLAX 1 cap daily and water.  Will reassess here in 2 weeks continue other meds as is

## 2025-03-06 NOTE — PROGRESS NOTES
Adult Annual Physical  Name: LUCERO Ashraf      : 1990      MRN: [id number]  Encounter Provider: Pool Huff MD  Encounter Date: 3/6/2025   Encounter department: [facility]    Assessment & Plan  Wellness examination    Orders:    Renal function panel; Future    Lipid panel; Future    Rectal bleeding  Discussed problem.  Will check CBC.  Will place on 1 tablespoon of orange flavored Metamucil in 4 ounces of cold water daily and 1 cap of MiraLAX daily.  Will reevaluate in 2 weeks  Orders:    CBC and differential; Future    Stroke (HCC)  Seems much better after the therapy.  Speech has improved.  Continue to push daily physical activity  Orders:    atorvastatin (LIPITOR) 40 mg tablet; Take 1 tablet (40 mg total) by mouth every evening    Aneurysm of descending thoracic aorta without rupture (HCC)  Does follow-up with vascular surgery  Orders:    atorvastatin (LIPITOR) 40 mg tablet; Take 1 tablet (40 mg total) by mouth every evening    Resistant hypertension  Pressure elevated today although claims just took the blood pressure medicine before coming in.  Will continue with current regimen and reassess at next visit  Orders:    amLODIPine (NORVASC) 10 mg tablet; Take 1 tablet (10 mg total) by mouth daily    lisinopril (ZESTRIL) 20 mg tablet; Take 1 tablet (20 mg total) by mouth every 12 (twelve) hours    Cerebrovascular accident (CVA) due to occlusion of left posterior cerebral artery (HCC)  Does follow-up with vascular surgery       Methadone use  Is currently in tapering mode       Left-sided weakness  This seems much better.  Push basic activity       Polycystic kidney disease  Does follow-up with nephrology       Aneurysm of anterior communicating artery  Was elected to just follow this       Immunizations and preventive care screenings were discussed with patient today. Appropriate education was printed on patient's after visit summary.    Counseling:  Exercise: the  importance of regular exercise/physical activity was discussed. Recommend exercise 3-5 times per week for at least 30 minutes.   Watch starch in diet      Depression Screening and Follow-up Plan: Patient was screened for depression during today's encounter. They screened negative with a PHQ-2 score of 2.          History of Present Illness     Adult Annual Physical:  Patient presents for annual physical.    Patient has history of hypertension, nicotine abuse, maintenance methadone therapy and polycystic kidney disease.  Was hospitalized due to occlusive CVA and found to have uncontrolled blood pressure and descending aortic aneurysm.  Does have a cerebral aneurysm although this was not considered severe and is currently set up with cardiology, nephrology, cerebral vascular surgery, and neurosurgery as well as neurology.  Did have physical therapy and is having difficulty controlling his left leg and foot drop.  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  Seen today for wellness check.  Has longstanding problem with constipation that dates back to youth.  Has been having rectal bleeding over the last month  .     Diet and Physical Activity:  - Diet/Nutrition: well balanced diet.  - Exercise: walking.    Depression Screening:  - PHQ-2 Score: 2    General Health:  - Sleep: sleeps well.  - Hearing: decreased hearing bilateral ears.  - Vision: no vision problems.  - Dental: no dental visits for > 1 year. Has no dentition     Health:  - History of STDs: yes.   - Urinary symptoms: none.     Advanced Care Planning:  - Has an advanced directive?: no    - Has a durable medical POA?: no    - ACP document given to patient?: no      Review of Systems   Constitutional:  Negative for activity change, appetite change, chills, fatigue, fever and unexpected weight change.   HENT:  Negative for rhinorrhea and trouble swallowing.    Eyes: Negative.   "Negative for visual disturbance.   Respiratory:  Negative for apnea, cough, chest tightness and shortness of breath.    Cardiovascular:  Negative for chest pain, palpitations and leg swelling.   Gastrointestinal:  Positive for blood in stool and constipation. Negative for abdominal distention, abdominal pain and diarrhea.   Endocrine: Negative for polyuria.   Genitourinary:  Negative for difficulty urinating and hematuria.   Musculoskeletal:  Negative for arthralgias and myalgias.   Skin:  Negative for color change and rash.   Allergic/Immunologic: Negative for environmental allergies.   Neurological:  Negative for dizziness, weakness, light-headedness, numbness and headaches.   Hematological:  Negative for adenopathy.   Psychiatric/Behavioral:  Negative for agitation and sleep disturbance.    All other systems reviewed and are negative.        Objective   Blood Pressure 160/100 (BP Location: Left arm, Patient Position: Sitting, Cuff Size: Large)   Height 5' 9\" (1.753 m)   Weight 81.6 kg (180 lb)   Body Mass Index 26.58 kg/m²     Physical Exam    "   Neck:      Vascular: No carotid bruit.   Cardiovascular:      Rate and Rhythm: Normal rate and regular rhythm.      Pulses: Normal pulses.      Heart sounds: Normal heart sounds. No murmur (Rate is 96) heard.  Pulmonary:      Effort: Pulmonary effort is normal.      Breath sounds: Normal breath sounds.   Abdominal:      General: Abdomen is flat. There is no distension.      Palpations: Abdomen is soft. There is no mass.      Tenderness: There is no abdominal tenderness.      Hernia: There is no hernia in the left inguinal area or right inguinal area.   Genitourinary:     Penis: Normal and circumcised.       Testes: Normal.   Musculoskeletal:         General: No swelling.      Cervical back: Normal range of motion and neck supple. No tenderness. No muscular tenderness.      Right lower leg: No edema.      Left lower leg: No edema.   Lymphadenopathy:      Cervical: No cervical adenopathy.   Skin:     General: Skin is warm and dry.      Findings: No rash.   Neurological:      Mental Status: He is alert and oriented to person, place, and time.      Cranial Nerves: No cranial nerve deficit.      Motor: No weakness.      Coordination: Coordination normal.      Gait: Gait normal.      Deep Tendon Reflexes: Reflexes abnormal (4+ with 2 beats of clonus).   Psychiatric:         Mood and Affect: Mood normal.         Behavior: Behavior normal.         Thought Content: Thought content normal.         Judgment: Judgment normal.

## 2025-03-07 PROBLEM — R11.0 NAUSEA: Status: RESOLVED | Noted: 2024-04-17 | Resolved: 2025-03-07

## 2025-03-07 PROBLEM — Z00.00 WELLNESS EXAMINATION: Status: ACTIVE | Noted: 2025-03-07

## 2025-03-07 PROBLEM — K62.5 RECTAL BLEEDING: Status: ACTIVE | Noted: 2025-03-07

## 2025-03-07 NOTE — ASSESSMENT & PLAN NOTE
Discussed problem.  Will check CBC.  Will place on 1 tablespoon of orange flavored Metamucil in 4 ounces of cold water daily and 1 cap of MiraLAX daily.  Will reevaluate in 2 weeks  Orders:    CBC and differential; Future

## 2025-03-11 ENCOUNTER — TELEPHONE (OUTPATIENT)
Dept: FAMILY MEDICINE CLINIC | Facility: CLINIC | Age: 35
End: 2025-03-11

## 2025-03-11 NOTE — TELEPHONE ENCOUNTER
Left a vm for the patient to call the office back. Patient is on the schedule for 03/20/2025, so I reached out to him to let him know that we do not participate with his insurance.